# Patient Record
Sex: FEMALE | Race: WHITE | NOT HISPANIC OR LATINO | Employment: FULL TIME | URBAN - METROPOLITAN AREA
[De-identification: names, ages, dates, MRNs, and addresses within clinical notes are randomized per-mention and may not be internally consistent; named-entity substitution may affect disease eponyms.]

---

## 2020-05-30 ENCOUNTER — APPOINTMENT (OUTPATIENT)
Dept: GENERAL RADIOLOGY | Facility: HOSPITAL | Age: 40
End: 2020-05-30

## 2020-05-30 ENCOUNTER — ANESTHESIA EVENT (OUTPATIENT)
Dept: PERIOP | Facility: HOSPITAL | Age: 40
End: 2020-05-30

## 2020-05-30 ENCOUNTER — HOSPITAL ENCOUNTER (INPATIENT)
Facility: HOSPITAL | Age: 40
LOS: 5 days | Discharge: HOME HEALTH | End: 2020-06-04
Attending: SURGERY | Admitting: INTERNAL MEDICINE

## 2020-05-30 ENCOUNTER — ANESTHESIA (OUTPATIENT)
Dept: PERIOP | Facility: HOSPITAL | Age: 40
End: 2020-05-30

## 2020-05-30 ENCOUNTER — APPOINTMENT (OUTPATIENT)
Dept: CT IMAGING | Facility: HOSPITAL | Age: 40
End: 2020-05-30

## 2020-05-30 DIAGNOSIS — R65.21 SEVERE SEPSIS WITH SEPTIC SHOCK (HCC): Primary | ICD-10-CM

## 2020-05-30 DIAGNOSIS — Z90.49 S/P APPENDECTOMY: ICD-10-CM

## 2020-05-30 DIAGNOSIS — A41.9 SEPSIS, DUE TO UNSPECIFIED ORGANISM, UNSPECIFIED WHETHER ACUTE ORGAN DYSFUNCTION PRESENT (HCC): ICD-10-CM

## 2020-05-30 DIAGNOSIS — R10.9 ABDOMINAL PAIN, UNSPECIFIED ABDOMINAL LOCATION: ICD-10-CM

## 2020-05-30 DIAGNOSIS — A41.9 SEVERE SEPSIS WITH SEPTIC SHOCK (HCC): Primary | ICD-10-CM

## 2020-05-30 DIAGNOSIS — K35.32 RUPTURED APPENDICITIS: ICD-10-CM

## 2020-05-30 LAB
ABO GROUP BLD: NORMAL
ALBUMIN SERPL-MCNC: 2.9 G/DL (ref 3.5–5.2)
ALBUMIN/GLOB SERPL: 0.9 G/DL
ALP SERPL-CCNC: 44 U/L (ref 39–117)
ALT SERPL W P-5'-P-CCNC: 7 U/L (ref 1–33)
ANION GAP SERPL CALCULATED.3IONS-SCNC: 14 MMOL/L (ref 5–15)
APTT PPP: 24.4 SECONDS (ref 24–31)
AST SERPL-CCNC: 9 U/L (ref 1–32)
B PERT DNA SPEC QL NAA+PROBE: NOT DETECTED
B-HCG UR QL: NEGATIVE
BACTERIA UR QL AUTO: ABNORMAL /HPF
BASOPHILS # BLD AUTO: 0 10*3/MM3 (ref 0–0.2)
BASOPHILS NFR BLD AUTO: 0.3 % (ref 0–1.5)
BILIRUB SERPL-MCNC: 0.9 MG/DL (ref 0.2–1.2)
BILIRUB UR QL STRIP: ABNORMAL
BLD GP AB SCN SERPL QL: NEGATIVE
BUN BLD-MCNC: 15 MG/DL (ref 6–20)
BUN/CREAT SERPL: 14.2 (ref 7–25)
C PNEUM DNA NPH QL NAA+NON-PROBE: NOT DETECTED
CA-I SERPL ISE-MCNC: 1.19 MMOL/L (ref 1.2–1.3)
CALCIUM SPEC-SCNC: 8.6 MG/DL (ref 8.6–10.5)
CHLORIDE SERPL-SCNC: 102 MMOL/L (ref 98–107)
CLARITY UR: ABNORMAL
CO2 SERPL-SCNC: 16 MMOL/L (ref 22–29)
COLOR UR: ABNORMAL
CREAT BLD-MCNC: 1.06 MG/DL (ref 0.57–1)
CRP SERPL-MCNC: 26.41 MG/DL (ref 0–0.5)
D-LACTATE SERPL-SCNC: 1.9 MMOL/L (ref 0.5–2)
D-LACTATE SERPL-SCNC: 2.6 MMOL/L (ref 0.5–2)
DEPRECATED RDW RBC AUTO: 42 FL (ref 37–54)
EOSINOPHIL # BLD AUTO: 0 10*3/MM3 (ref 0–0.4)
EOSINOPHIL NFR BLD AUTO: 0.1 % (ref 0.3–6.2)
ERYTHROCYTE [DISTWIDTH] IN BLOOD BY AUTOMATED COUNT: 13.3 % (ref 12.3–15.4)
FLUAV H1 2009 PAND RNA NPH QL NAA+PROBE: NOT DETECTED
FLUAV H1 HA GENE NPH QL NAA+PROBE: NOT DETECTED
FLUAV H3 RNA NPH QL NAA+PROBE: NOT DETECTED
FLUAV SUBTYP SPEC NAA+PROBE: NOT DETECTED
FLUBV RNA ISLT QL NAA+PROBE: NOT DETECTED
GFR SERPL CREATININE-BSD FRML MDRD: 58 ML/MIN/1.73
GLOBULIN UR ELPH-MCNC: 3.2 GM/DL
GLUCOSE BLD-MCNC: 202 MG/DL (ref 65–99)
GLUCOSE BLDC GLUCOMTR-MCNC: 178 MG/DL (ref 70–105)
GLUCOSE BLDC GLUCOMTR-MCNC: 181 MG/DL (ref 70–105)
GLUCOSE UR STRIP-MCNC: ABNORMAL MG/DL
GRAN CASTS URNS QL MICRO: ABNORMAL /LPF
HADV DNA SPEC NAA+PROBE: NOT DETECTED
HCOV 229E RNA SPEC QL NAA+PROBE: NOT DETECTED
HCOV HKU1 RNA SPEC QL NAA+PROBE: NOT DETECTED
HCOV NL63 RNA SPEC QL NAA+PROBE: NOT DETECTED
HCOV OC43 RNA SPEC QL NAA+PROBE: NOT DETECTED
HCT VFR BLD AUTO: 34 % (ref 34–46.6)
HGB BLD-MCNC: 11.2 G/DL (ref 12–15.9)
HGB UR QL STRIP.AUTO: NEGATIVE
HMPV RNA NPH QL NAA+NON-PROBE: NOT DETECTED
HOLD SPECIMEN: NORMAL
HPIV1 RNA SPEC QL NAA+PROBE: NOT DETECTED
HPIV2 RNA SPEC QL NAA+PROBE: NOT DETECTED
HPIV3 RNA NPH QL NAA+PROBE: NOT DETECTED
HPIV4 P GENE NPH QL NAA+PROBE: NOT DETECTED
HYALINE CASTS UR QL AUTO: ABNORMAL /LPF
INR PPP: 1.06 (ref 0.9–1.1)
KETONES UR QL STRIP: ABNORMAL
LACTATE HOLD SPECIMEN: NORMAL
LEUKOCYTE ESTERASE UR QL STRIP.AUTO: ABNORMAL
LIPASE SERPL-CCNC: 9 U/L (ref 13–60)
LYMPHOCYTES # BLD AUTO: 0.3 10*3/MM3 (ref 0.7–3.1)
LYMPHOCYTES NFR BLD AUTO: 10.2 % (ref 19.6–45.3)
M PNEUMO IGG SER IA-ACNC: NOT DETECTED
MAGNESIUM SERPL-MCNC: 1.6 MG/DL (ref 1.6–2.6)
MCH RBC QN AUTO: 29.7 PG (ref 26.6–33)
MCHC RBC AUTO-ENTMCNC: 33 G/DL (ref 31.5–35.7)
MCV RBC AUTO: 89.8 FL (ref 79–97)
MONOCYTES # BLD AUTO: 0.2 10*3/MM3 (ref 0.1–0.9)
MONOCYTES NFR BLD AUTO: 5.6 % (ref 5–12)
MRSA DNA SPEC QL NAA+PROBE: NORMAL
NEUTROPHILS # BLD AUTO: 2.9 10*3/MM3 (ref 1.7–7)
NEUTROPHILS NFR BLD AUTO: 83.8 % (ref 42.7–76)
NITRITE UR QL STRIP: POSITIVE
NRBC BLD AUTO-RTO: 0 /100 WBC (ref 0–0.2)
PH UR STRIP.AUTO: <=5 [PH] (ref 5–8)
PHOSPHATE SERPL-MCNC: 2.7 MG/DL (ref 2.5–4.5)
PLATELET # BLD AUTO: 391 10*3/MM3 (ref 140–450)
PMV BLD AUTO: 7.5 FL (ref 6–12)
POTASSIUM BLD-SCNC: 3.5 MMOL/L (ref 3.5–5.2)
PROCALCITONIN SERPL-MCNC: 7.41 NG/ML (ref 0.1–0.25)
PROT SERPL-MCNC: 6.1 G/DL (ref 6–8.5)
PROT UR QL STRIP: ABNORMAL
PROTHROMBIN TIME: 11 SECONDS (ref 9.6–11.7)
RBC # BLD AUTO: 3.79 10*6/MM3 (ref 3.77–5.28)
RBC # UR: ABNORMAL /HPF
REF LAB TEST METHOD: ABNORMAL
RH BLD: POSITIVE
RHINOVIRUS RNA SPEC NAA+PROBE: NOT DETECTED
RSV RNA NPH QL NAA+NON-PROBE: NOT DETECTED
SARS-COV-2 RNA RESP QL NAA+PROBE: NOT DETECTED
SODIUM BLD-SCNC: 132 MMOL/L (ref 136–145)
SP GR UR STRIP: 1.03 (ref 1–1.03)
SQUAMOUS #/AREA URNS HPF: ABNORMAL /HPF
T&S EXPIRATION DATE: NORMAL
TROPONIN T SERPL-MCNC: <0.01 NG/ML (ref 0–0.03)
UROBILINOGEN UR QL STRIP: ABNORMAL
WBC NRBC COR # BLD: 3.4 10*3/MM3 (ref 3.4–10.8)
WBC UR QL AUTO: ABNORMAL /HPF
WHOLE BLOOD HOLD SPECIMEN: NORMAL
WHOLE BLOOD HOLD SPECIMEN: NORMAL

## 2020-05-30 PROCEDURE — 83735 ASSAY OF MAGNESIUM: CPT

## 2020-05-30 PROCEDURE — 87040 BLOOD CULTURE FOR BACTERIA: CPT

## 2020-05-30 PROCEDURE — 25010000002 ALBUMIN HUMAN 5% PER 50 ML: Performed by: ANESTHESIOLOGY

## 2020-05-30 PROCEDURE — 83605 ASSAY OF LACTIC ACID: CPT

## 2020-05-30 PROCEDURE — 83036 HEMOGLOBIN GLYCOSYLATED A1C: CPT | Performed by: NURSE PRACTITIONER

## 2020-05-30 PROCEDURE — 80053 COMPREHEN METABOLIC PANEL: CPT

## 2020-05-30 PROCEDURE — 82330 ASSAY OF CALCIUM: CPT

## 2020-05-30 PROCEDURE — 25010000002 ENOXAPARIN PER 10 MG: Performed by: PHYSICIAN ASSISTANT

## 2020-05-30 PROCEDURE — P9041 ALBUMIN (HUMAN),5%, 50ML: HCPCS | Performed by: ANESTHESIOLOGY

## 2020-05-30 PROCEDURE — 36415 COLL VENOUS BLD VENIPUNCTURE: CPT

## 2020-05-30 PROCEDURE — 85730 THROMBOPLASTIN TIME PARTIAL: CPT

## 2020-05-30 PROCEDURE — 82962 GLUCOSE BLOOD TEST: CPT

## 2020-05-30 PROCEDURE — 25010000002 PROPOFOL 10 MG/ML EMULSION: Performed by: ANESTHESIOLOGY

## 2020-05-30 PROCEDURE — 25010000002 PIPERACILLIN SOD-TAZOBACTAM PER 1 G: Performed by: EMERGENCY MEDICINE

## 2020-05-30 PROCEDURE — 99284 EMERGENCY DEPT VISIT MOD MDM: CPT

## 2020-05-30 PROCEDURE — 86900 BLOOD TYPING SEROLOGIC ABO: CPT | Performed by: PHYSICIAN ASSISTANT

## 2020-05-30 PROCEDURE — 71045 X-RAY EXAM CHEST 1 VIEW: CPT

## 2020-05-30 PROCEDURE — 25010000002 ONDANSETRON PER 1 MG: Performed by: EMERGENCY MEDICINE

## 2020-05-30 PROCEDURE — 25010000002 PIPERACILLIN SOD-TAZOBACTAM PER 1 G: Performed by: NURSE PRACTITIONER

## 2020-05-30 PROCEDURE — C1751 CATH, INF, PER/CENT/MIDLINE: HCPCS

## 2020-05-30 PROCEDURE — P9612 CATHETERIZE FOR URINE SPEC: HCPCS

## 2020-05-30 PROCEDURE — 88304 TISSUE EXAM BY PATHOLOGIST: CPT | Performed by: SURGERY

## 2020-05-30 PROCEDURE — 86850 RBC ANTIBODY SCREEN: CPT | Performed by: PHYSICIAN ASSISTANT

## 2020-05-30 PROCEDURE — 25010000002 MORPHINE PER 10 MG: Performed by: EMERGENCY MEDICINE

## 2020-05-30 PROCEDURE — 81001 URINALYSIS AUTO W/SCOPE: CPT

## 2020-05-30 PROCEDURE — 44960 APPENDECTOMY: CPT | Performed by: SURGERY

## 2020-05-30 PROCEDURE — 93005 ELECTROCARDIOGRAM TRACING: CPT | Performed by: SURGERY

## 2020-05-30 PROCEDURE — 86901 BLOOD TYPING SEROLOGIC RH(D): CPT | Performed by: PHYSICIAN ASSISTANT

## 2020-05-30 PROCEDURE — 74177 CT ABD & PELVIS W/CONTRAST: CPT

## 2020-05-30 PROCEDURE — 02HV33Z INSERTION OF INFUSION DEVICE INTO SUPERIOR VENA CAVA, PERCUTANEOUS APPROACH: ICD-10-PCS | Performed by: INTERNAL MEDICINE

## 2020-05-30 PROCEDURE — 0099U HC BIOFIRE FILMARRAY RESP PANEL 1: CPT | Performed by: NURSE PRACTITIONER

## 2020-05-30 PROCEDURE — 0DTJ0ZZ RESECTION OF APPENDIX, OPEN APPROACH: ICD-10-PCS | Performed by: SURGERY

## 2020-05-30 PROCEDURE — 25010000002 ONDANSETRON PER 1 MG: Performed by: ANESTHESIOLOGY

## 2020-05-30 PROCEDURE — P9041 ALBUMIN (HUMAN),5%, 50ML: HCPCS | Performed by: SURGERY

## 2020-05-30 PROCEDURE — 86140 C-REACTIVE PROTEIN: CPT

## 2020-05-30 PROCEDURE — 84484 ASSAY OF TROPONIN QUANT: CPT | Performed by: PHYSICIAN ASSISTANT

## 2020-05-30 PROCEDURE — 36600 WITHDRAWAL OF ARTERIAL BLOOD: CPT

## 2020-05-30 PROCEDURE — 87641 MR-STAPH DNA AMP PROBE: CPT | Performed by: NURSE PRACTITIONER

## 2020-05-30 PROCEDURE — 86900 BLOOD TYPING SEROLOGIC ABO: CPT

## 2020-05-30 PROCEDURE — 25010000002 ALBUMIN HUMAN 5% PER 50 ML: Performed by: SURGERY

## 2020-05-30 PROCEDURE — 99222 1ST HOSP IP/OBS MODERATE 55: CPT | Performed by: SURGERY

## 2020-05-30 PROCEDURE — 25010000002 PHENYLEPHRINE 10 MG/ML SOLUTION: Performed by: ANESTHESIOLOGY

## 2020-05-30 PROCEDURE — 85610 PROTHROMBIN TIME: CPT

## 2020-05-30 PROCEDURE — 25010000002 DEXAMETHASONE PER 1 MG: Performed by: ANESTHESIOLOGY

## 2020-05-30 PROCEDURE — 25010000002 FENTANYL CITRATE (PF) 100 MCG/2ML SOLUTION: Performed by: ANESTHESIOLOGY

## 2020-05-30 PROCEDURE — 85025 COMPLETE CBC W/AUTO DIFF WBC: CPT

## 2020-05-30 PROCEDURE — 25010000002 MIDAZOLAM PER 1 MG: Performed by: ANESTHESIOLOGY

## 2020-05-30 PROCEDURE — 81025 URINE PREGNANCY TEST: CPT | Performed by: PHYSICIAN ASSISTANT

## 2020-05-30 PROCEDURE — 82803 BLOOD GASES ANY COMBINATION: CPT

## 2020-05-30 PROCEDURE — 63710000001 INSULIN LISPRO (HUMAN) PER 5 UNITS: Performed by: NURSE PRACTITIONER

## 2020-05-30 PROCEDURE — 83690 ASSAY OF LIPASE: CPT | Performed by: PHYSICIAN ASSISTANT

## 2020-05-30 PROCEDURE — 0 IOPAMIDOL PER 1 ML: Performed by: PHYSICIAN ASSISTANT

## 2020-05-30 PROCEDURE — 87635 SARS-COV-2 COVID-19 AMP PRB: CPT | Performed by: PHYSICIAN ASSISTANT

## 2020-05-30 PROCEDURE — 84145 PROCALCITONIN (PCT): CPT | Performed by: NURSE PRACTITIONER

## 2020-05-30 PROCEDURE — 84100 ASSAY OF PHOSPHORUS: CPT

## 2020-05-30 PROCEDURE — 93005 ELECTROCARDIOGRAM TRACING: CPT

## 2020-05-30 PROCEDURE — 86901 BLOOD TYPING SEROLOGIC RH(D): CPT

## 2020-05-30 DEVICE — ENDOPATH ECHELON VASCULAR  RELOADS, WHITE, 35MM
Type: IMPLANTABLE DEVICE | Site: ABDOMEN | Status: FUNCTIONAL
Brand: ECHELON ENDOPATH

## 2020-05-30 RX ORDER — ONDANSETRON 2 MG/ML
INJECTION INTRAMUSCULAR; INTRAVENOUS AS NEEDED
Status: DISCONTINUED | OUTPATIENT
Start: 2020-05-30 | End: 2020-05-30 | Stop reason: SURG

## 2020-05-30 RX ORDER — ACETAMINOPHEN 325 MG/1
650 TABLET ORAL ONCE AS NEEDED
Status: DISCONTINUED | OUTPATIENT
Start: 2020-05-30 | End: 2020-05-30 | Stop reason: HOSPADM

## 2020-05-30 RX ORDER — SODIUM CHLORIDE 9 MG/ML
125 INJECTION, SOLUTION INTRAVENOUS CONTINUOUS
Status: DISCONTINUED | OUTPATIENT
Start: 2020-05-30 | End: 2020-05-30

## 2020-05-30 RX ORDER — ONDANSETRON 2 MG/ML
4 INJECTION INTRAMUSCULAR; INTRAVENOUS ONCE
Status: COMPLETED | OUTPATIENT
Start: 2020-05-30 | End: 2020-05-30

## 2020-05-30 RX ORDER — SODIUM CHLORIDE 0.9 % (FLUSH) 0.9 %
10 SYRINGE (ML) INJECTION AS NEEDED
Status: DISCONTINUED | OUTPATIENT
Start: 2020-05-30 | End: 2020-05-31 | Stop reason: SDUPTHER

## 2020-05-30 RX ORDER — IPRATROPIUM BROMIDE AND ALBUTEROL SULFATE 2.5; .5 MG/3ML; MG/3ML
3 SOLUTION RESPIRATORY (INHALATION) ONCE AS NEEDED
Status: DISCONTINUED | OUTPATIENT
Start: 2020-05-30 | End: 2020-05-30 | Stop reason: HOSPADM

## 2020-05-30 RX ORDER — IPRATROPIUM BROMIDE AND ALBUTEROL SULFATE 2.5; .5 MG/3ML; MG/3ML
3 SOLUTION RESPIRATORY (INHALATION)
Status: DISPENSED | OUTPATIENT
Start: 2020-05-30 | End: 2020-06-01

## 2020-05-30 RX ORDER — PHENYLEPHRINE HCL IN 0.9% NACL 0.5 MG/5ML
SYRINGE (ML) INTRAVENOUS AS NEEDED
Status: DISCONTINUED | OUTPATIENT
Start: 2020-05-30 | End: 2020-05-30 | Stop reason: SURG

## 2020-05-30 RX ORDER — MORPHINE SULFATE 4 MG/ML
2 INJECTION, SOLUTION INTRAMUSCULAR; INTRAVENOUS
Status: DISCONTINUED | OUTPATIENT
Start: 2020-05-30 | End: 2020-05-30 | Stop reason: HOSPADM

## 2020-05-30 RX ORDER — ACETAMINOPHEN 650 MG/1
650 SUPPOSITORY RECTAL ONCE AS NEEDED
Status: DISCONTINUED | OUTPATIENT
Start: 2020-05-30 | End: 2020-05-30 | Stop reason: HOSPADM

## 2020-05-30 RX ORDER — DEXAMETHASONE SODIUM PHOSPHATE 4 MG/ML
INJECTION, SOLUTION INTRA-ARTICULAR; INTRALESIONAL; INTRAMUSCULAR; INTRAVENOUS; SOFT TISSUE AS NEEDED
Status: DISCONTINUED | OUTPATIENT
Start: 2020-05-30 | End: 2020-05-30 | Stop reason: SURG

## 2020-05-30 RX ORDER — SODIUM CHLORIDE 0.9 % (FLUSH) 0.9 %
20 SYRINGE (ML) INJECTION AS NEEDED
Status: DISCONTINUED | OUTPATIENT
Start: 2020-05-30 | End: 2020-05-31 | Stop reason: SDUPTHER

## 2020-05-30 RX ORDER — ALBUMIN, HUMAN INJ 5% 5 %
SOLUTION INTRAVENOUS CONTINUOUS PRN
Status: DISCONTINUED | OUTPATIENT
Start: 2020-05-30 | End: 2020-05-30 | Stop reason: SURG

## 2020-05-30 RX ORDER — SODIUM CHLORIDE 0.9 % (FLUSH) 0.9 %
10 SYRINGE (ML) INJECTION EVERY 12 HOURS SCHEDULED
Status: DISCONTINUED | OUTPATIENT
Start: 2020-05-30 | End: 2020-05-31 | Stop reason: SDUPTHER

## 2020-05-30 RX ORDER — SODIUM CHLORIDE, SODIUM LACTATE, POTASSIUM CHLORIDE, CALCIUM CHLORIDE 600; 310; 30; 20 MG/100ML; MG/100ML; MG/100ML; MG/100ML
INJECTION, SOLUTION INTRAVENOUS CONTINUOUS PRN
Status: DISCONTINUED | OUTPATIENT
Start: 2020-05-30 | End: 2020-05-30 | Stop reason: SURG

## 2020-05-30 RX ORDER — METRONIDAZOLE 500 MG/1
500 TABLET ORAL 3 TIMES DAILY
COMMUNITY
Start: 2020-05-25 | End: 2020-06-04 | Stop reason: HOSPADM

## 2020-05-30 RX ORDER — IPRATROPIUM BROMIDE AND ALBUTEROL SULFATE 2.5; .5 MG/3ML; MG/3ML
3 SOLUTION RESPIRATORY (INHALATION)
Status: DISCONTINUED | OUTPATIENT
Start: 2020-05-30 | End: 2020-06-04 | Stop reason: HOSPADM

## 2020-05-30 RX ORDER — PROMETHAZINE HYDROCHLORIDE 25 MG/1
25 TABLET ORAL ONCE AS NEEDED
Status: DISCONTINUED | OUTPATIENT
Start: 2020-05-30 | End: 2020-05-30 | Stop reason: HOSPADM

## 2020-05-30 RX ORDER — PROMETHAZINE HYDROCHLORIDE 25 MG/1
25 SUPPOSITORY RECTAL ONCE AS NEEDED
Status: DISCONTINUED | OUTPATIENT
Start: 2020-05-30 | End: 2020-05-30 | Stop reason: HOSPADM

## 2020-05-30 RX ORDER — ONDANSETRON 2 MG/ML
4 INJECTION INTRAMUSCULAR; INTRAVENOUS ONCE AS NEEDED
Status: DISCONTINUED | OUTPATIENT
Start: 2020-05-30 | End: 2020-05-30 | Stop reason: HOSPADM

## 2020-05-30 RX ORDER — PANTOPRAZOLE SODIUM 40 MG/10ML
40 INJECTION, POWDER, LYOPHILIZED, FOR SOLUTION INTRAVENOUS
Status: DISCONTINUED | OUTPATIENT
Start: 2020-05-31 | End: 2020-06-04 | Stop reason: HOSPADM

## 2020-05-30 RX ORDER — PROMETHAZINE HYDROCHLORIDE 25 MG/ML
6.25 INJECTION, SOLUTION INTRAMUSCULAR; INTRAVENOUS ONCE AS NEEDED
Status: DISCONTINUED | OUTPATIENT
Start: 2020-05-30 | End: 2020-05-30 | Stop reason: HOSPADM

## 2020-05-30 RX ORDER — PROPOFOL 10 MG/ML
VIAL (ML) INTRAVENOUS AS NEEDED
Status: DISCONTINUED | OUTPATIENT
Start: 2020-05-30 | End: 2020-05-30 | Stop reason: SURG

## 2020-05-30 RX ORDER — SODIUM CHLORIDE 0.9 % (FLUSH) 0.9 %
10 SYRINGE (ML) INJECTION AS NEEDED
Status: DISCONTINUED | OUTPATIENT
Start: 2020-05-30 | End: 2020-06-04 | Stop reason: HOSPADM

## 2020-05-30 RX ORDER — NICOTINE POLACRILEX 4 MG
15 LOZENGE BUCCAL
Status: DISCONTINUED | OUTPATIENT
Start: 2020-05-30 | End: 2020-06-03

## 2020-05-30 RX ORDER — MIDAZOLAM HYDROCHLORIDE 1 MG/ML
INJECTION INTRAMUSCULAR; INTRAVENOUS AS NEEDED
Status: DISCONTINUED | OUTPATIENT
Start: 2020-05-30 | End: 2020-05-30 | Stop reason: SURG

## 2020-05-30 RX ORDER — MORPHINE SULFATE 4 MG/ML
4 INJECTION, SOLUTION INTRAMUSCULAR; INTRAVENOUS ONCE
Status: DISCONTINUED | OUTPATIENT
Start: 2020-05-30 | End: 2020-05-30

## 2020-05-30 RX ORDER — LIDOCAINE HYDROCHLORIDE 20 MG/ML
INJECTION, SOLUTION EPIDURAL; INFILTRATION; INTRACAUDAL; PERINEURAL AS NEEDED
Status: DISCONTINUED | OUTPATIENT
Start: 2020-05-30 | End: 2020-05-30 | Stop reason: SURG

## 2020-05-30 RX ORDER — CIPROFLOXACIN 500 MG/1
500 TABLET, FILM COATED ORAL 2 TIMES DAILY
COMMUNITY
Start: 2020-05-25 | End: 2020-06-04 | Stop reason: HOSPADM

## 2020-05-30 RX ORDER — EPHEDRINE SULFATE/0.9% NACL/PF 25 MG/5 ML
SYRINGE (ML) INTRAVENOUS AS NEEDED
Status: DISCONTINUED | OUTPATIENT
Start: 2020-05-30 | End: 2020-05-30 | Stop reason: SURG

## 2020-05-30 RX ORDER — ALBUMIN, HUMAN INJ 5% 5 %
250 SOLUTION INTRAVENOUS ONCE
Status: COMPLETED | OUTPATIENT
Start: 2020-05-30 | End: 2020-05-30

## 2020-05-30 RX ORDER — MORPHINE SULFATE 4 MG/ML
2 INJECTION, SOLUTION INTRAMUSCULAR; INTRAVENOUS EVERY 4 HOURS PRN
Status: DISCONTINUED | OUTPATIENT
Start: 2020-05-30 | End: 2020-06-04 | Stop reason: HOSPADM

## 2020-05-30 RX ORDER — LABETALOL HYDROCHLORIDE 5 MG/ML
5 INJECTION, SOLUTION INTRAVENOUS
Status: DISCONTINUED | OUTPATIENT
Start: 2020-05-30 | End: 2020-05-30 | Stop reason: HOSPADM

## 2020-05-30 RX ORDER — PHENYLEPHRINE HCL IN 0.9% NACL 0.5 MG/5ML
.5-3 SYRINGE (ML) INTRAVENOUS
Status: DISCONTINUED | OUTPATIENT
Start: 2020-05-30 | End: 2020-06-01

## 2020-05-30 RX ORDER — FENTANYL CITRATE 50 UG/ML
INJECTION, SOLUTION INTRAMUSCULAR; INTRAVENOUS AS NEEDED
Status: DISCONTINUED | OUTPATIENT
Start: 2020-05-30 | End: 2020-05-30 | Stop reason: SURG

## 2020-05-30 RX ORDER — SODIUM CHLORIDE 0.9 % (FLUSH) 0.9 %
10 SYRINGE (ML) INJECTION EVERY 12 HOURS SCHEDULED
Status: DISCONTINUED | OUTPATIENT
Start: 2020-05-30 | End: 2020-06-04 | Stop reason: HOSPADM

## 2020-05-30 RX ORDER — ONDANSETRON 4 MG/1
4 TABLET, FILM COATED ORAL EVERY 6 HOURS PRN
Status: DISCONTINUED | OUTPATIENT
Start: 2020-05-30 | End: 2020-06-04 | Stop reason: HOSPADM

## 2020-05-30 RX ORDER — ACETAMINOPHEN 325 MG/1
650 TABLET ORAL EVERY 6 HOURS PRN
Status: DISCONTINUED | OUTPATIENT
Start: 2020-05-30 | End: 2020-06-04 | Stop reason: HOSPADM

## 2020-05-30 RX ORDER — FENTANYL CITRATE 50 UG/ML
25 INJECTION, SOLUTION INTRAMUSCULAR; INTRAVENOUS
Status: DISCONTINUED | OUTPATIENT
Start: 2020-05-30 | End: 2020-05-30

## 2020-05-30 RX ORDER — DEXTROSE MONOHYDRATE 25 G/50ML
25 INJECTION, SOLUTION INTRAVENOUS
Status: DISCONTINUED | OUTPATIENT
Start: 2020-05-30 | End: 2020-06-03

## 2020-05-30 RX ORDER — ROCURONIUM BROMIDE 10 MG/ML
INJECTION, SOLUTION INTRAVENOUS AS NEEDED
Status: DISCONTINUED | OUTPATIENT
Start: 2020-05-30 | End: 2020-05-30 | Stop reason: SURG

## 2020-05-30 RX ORDER — ONDANSETRON 2 MG/ML
4 INJECTION INTRAMUSCULAR; INTRAVENOUS EVERY 6 HOURS PRN
Status: DISCONTINUED | OUTPATIENT
Start: 2020-05-30 | End: 2020-06-04 | Stop reason: HOSPADM

## 2020-05-30 RX ADMIN — PHENYLEPHRINE HYDROCHLORIDE 200 MCG: 10 INJECTION INTRAVENOUS at 15:52

## 2020-05-30 RX ADMIN — PHENYLEPHRINE HYDROCHLORIDE 200 MCG: 10 INJECTION INTRAVENOUS at 15:33

## 2020-05-30 RX ADMIN — FENTANYL CITRATE 50 MCG: 50 INJECTION, SOLUTION INTRAMUSCULAR; INTRAVENOUS at 16:23

## 2020-05-30 RX ADMIN — Medication 2 MCG/KG/MIN: at 17:26

## 2020-05-30 RX ADMIN — PHENYLEPHRINE HYDROCHLORIDE 200 MCG: 10 INJECTION INTRAVENOUS at 15:37

## 2020-05-30 RX ADMIN — ALBUMIN HUMAN 250 ML: 0.05 INJECTION, SOLUTION INTRAVENOUS at 14:52

## 2020-05-30 RX ADMIN — PHENYLEPHRINE HYDROCHLORIDE 200 MCG: 10 INJECTION INTRAVENOUS at 15:50

## 2020-05-30 RX ADMIN — Medication 10 ML: at 23:20

## 2020-05-30 RX ADMIN — PIPERACILLIN AND TAZOBACTAM 3.38 G: 3; .375 INJECTION, POWDER, FOR SOLUTION INTRAVENOUS at 18:52

## 2020-05-30 RX ADMIN — SODIUM CHLORIDE 1641 ML: 900 INJECTION, SOLUTION INTRAVENOUS at 10:22

## 2020-05-30 RX ADMIN — SODIUM CHLORIDE 1000 ML: 900 INJECTION, SOLUTION INTRAVENOUS at 11:19

## 2020-05-30 RX ADMIN — INSULIN LISPRO 2 UNITS: 100 INJECTION, SOLUTION INTRAVENOUS; SUBCUTANEOUS at 18:52

## 2020-05-30 RX ADMIN — FENTANYL CITRATE 50 MCG: 50 INJECTION, SOLUTION INTRAMUSCULAR; INTRAVENOUS at 16:20

## 2020-05-30 RX ADMIN — Medication 10 ML: at 23:21

## 2020-05-30 RX ADMIN — PHENYLEPHRINE HYDROCHLORIDE 200 MCG: 10 INJECTION INTRAVENOUS at 15:46

## 2020-05-30 RX ADMIN — PHENYLEPHRINE HYDROCHLORIDE 200 MCG: 10 INJECTION INTRAVENOUS at 15:38

## 2020-05-30 RX ADMIN — ENOXAPARIN SODIUM 40 MG: 40 INJECTION SUBCUTANEOUS at 21:13

## 2020-05-30 RX ADMIN — MIDAZOLAM 2 MG: 1 INJECTION INTRAMUSCULAR; INTRAVENOUS at 15:30

## 2020-05-30 RX ADMIN — ONDANSETRON 4 MG: 2 INJECTION INTRAMUSCULAR; INTRAVENOUS at 10:24

## 2020-05-30 RX ADMIN — INSULIN LISPRO 2 UNITS: 100 INJECTION, SOLUTION INTRAVENOUS; SUBCUTANEOUS at 23:24

## 2020-05-30 RX ADMIN — SODIUM CHLORIDE 125 ML/HR: 0.9 INJECTION, SOLUTION INTRAVENOUS at 14:40

## 2020-05-30 RX ADMIN — Medication 5 MG: at 15:51

## 2020-05-30 RX ADMIN — ONDANSETRON 4 MG: 2 INJECTION INTRAMUSCULAR; INTRAVENOUS at 15:55

## 2020-05-30 RX ADMIN — SODIUM CHLORIDE: 0.9 INJECTION, SOLUTION INTRAVENOUS at 15:53

## 2020-05-30 RX ADMIN — MORPHINE SULFATE 2 MG: 4 INJECTION INTRAVENOUS at 13:50

## 2020-05-30 RX ADMIN — PHENYLEPHRINE HYDROCHLORIDE 200 MCG: 10 INJECTION INTRAVENOUS at 15:43

## 2020-05-30 RX ADMIN — ROCURONIUM BROMIDE 50 MG: 10 INJECTION, SOLUTION INTRAVENOUS at 15:30

## 2020-05-30 RX ADMIN — PROPOFOL 120 MG: 10 INJECTION, EMULSION INTRAVENOUS at 15:30

## 2020-05-30 RX ADMIN — Medication 3 MCG/KG/MIN: at 21:12

## 2020-05-30 RX ADMIN — PHENYLEPHRINE HYDROCHLORIDE 200 MCG: 10 INJECTION INTRAVENOUS at 15:35

## 2020-05-30 RX ADMIN — DEXAMETHASONE SODIUM PHOSPHATE 4 MG: 4 INJECTION, SOLUTION INTRAMUSCULAR; INTRAVENOUS at 15:45

## 2020-05-30 RX ADMIN — ACETAMINOPHEN 650 MG: 325 TABLET, FILM COATED ORAL at 21:11

## 2020-05-30 RX ADMIN — PIPERACILLIN AND TAZOBACTAM 3.38 G: 3; .375 INJECTION, POWDER, FOR SOLUTION INTRAVENOUS at 10:24

## 2020-05-30 RX ADMIN — ALBUMIN HUMAN: 0.05 INJECTION, SOLUTION INTRAVENOUS at 15:55

## 2020-05-30 RX ADMIN — PHENYLEPHRINE HYDROCHLORIDE 200 MCG: 10 INJECTION INTRAVENOUS at 15:48

## 2020-05-30 RX ADMIN — PHENYLEPHRINE HYDROCHLORIDE 200 MCG: 10 INJECTION INTRAVENOUS at 15:45

## 2020-05-30 RX ADMIN — FENTANYL CITRATE 100 MCG: 50 INJECTION, SOLUTION INTRAMUSCULAR; INTRAVENOUS at 15:30

## 2020-05-30 RX ADMIN — PHENYLEPHRINE HYDROCHLORIDE 200 MCG: 10 INJECTION INTRAVENOUS at 15:41

## 2020-05-30 RX ADMIN — SODIUM CHLORIDE, SODIUM LACTATE, POTASSIUM CHLORIDE, AND CALCIUM CHLORIDE: .6; .31; .03; .02 INJECTION, SOLUTION INTRAVENOUS at 15:23

## 2020-05-30 RX ADMIN — PHENYLEPHRINE HYDROCHLORIDE 200 MCG: 10 INJECTION INTRAVENOUS at 15:34

## 2020-05-30 RX ADMIN — PHENYLEPHRINE HYDROCHLORIDE 200 MCG: 10 INJECTION INTRAVENOUS at 15:39

## 2020-05-30 RX ADMIN — LIDOCAINE HYDROCHLORIDE 100 MG: 20 INJECTION, SOLUTION EPIDURAL; INFILTRATION; INTRACAUDAL; PERINEURAL at 15:30

## 2020-05-30 RX ADMIN — IOPAMIDOL 100 ML: 755 INJECTION, SOLUTION INTRAVENOUS at 11:54

## 2020-05-30 NOTE — ANESTHESIA POSTPROCEDURE EVALUATION
Patient: Suzie Duvall    Procedure Summary     Date:  05/30/20 Room / Location:  [facility] OR  / [facility] MAIN OR    Anesthesia Start:  1523 Anesthesia Stop:  1630    Procedure:  LAPAROTOMY EXPLORATORY (N/A Abdomen) Diagnosis:       Ruptured appendicitis      (Ruptured appendicitis [K35.32])    Surgeon:  Scarlet Ruvalcaba MD Provider:  Deshaun Valladares MD    Anesthesia Type:  general ASA Status:  3 - Emergent          Anesthesia Type: general    Vitals  Vitals Value Taken Time   BP 87/51 5/30/2020  5:28 PM   Temp 101.6 °F (38.7 °C) 5/30/2020  4:30 PM   Pulse 122 5/30/2020  5:30 PM   Resp 14 5/30/2020  4:30 PM   SpO2 100 % 5/30/2020  5:30 PM   Vitals shown include unvalidated device data.        Post Anesthesia Care and Evaluation    Patient location during evaluation: PACU  Patient participation: complete - patient participated  Level of consciousness: sleepy but conscious  Pain score: 0  Pain management: adequate  Airway patency: patent  Anesthetic complications: No anesthetic complications  PONV Status: none  Cardiovascular status: acceptable and hypotensive  Respiratory status: acceptable  Hydration status: acceptable

## 2020-05-30 NOTE — ANESTHESIA PROCEDURE NOTES
Airway  Urgency: emergent    Date/Time: 5/30/2020 3:28 PM  Airway not difficult    General Information and Staff    Patient location during procedure: OR  Anesthesiologist: Deshaun Valladares MD    Consent for Airway (if performed for an anesthetic, see related documentation for consents)  Patient identity confirmed: verbally with patient  Consent: No emergent situation. Verbal consent obtained.  Consent given by: patient      Indications and Patient Condition  Indications for airway management: airway protection and cardiovascular instability    Preoxygenated: yes  MILS not maintained throughout  Mask difficulty assessment: 0 - not attempted    Final Airway Details  Final airway type: endotracheal airway      Successful airway: ETT  Cuffed: yes   Successful intubation technique: direct laryngoscopy  Facilitating devices/methods: intubating stylet  Endotracheal tube insertion site: oral  Blade: Abbi  Blade size: 3  ETT size (mm): 7.0  Cormack-Lehane Classification: grade IIa - partial view of glottis  Placement verified by: capnometry   Measured from: gums  ETT/EBT to gums (cm): 20  Number of attempts at approach: 1  Assessment: lips, teeth, and gum same as pre-op and atraumatic intubation    Additional Comments  Full PPE

## 2020-05-30 NOTE — ANESTHESIA PREPROCEDURE EVALUATION
Anesthesia Evaluation     Patient summary reviewed and Nursing notes reviewed   NPO Solid Status: > 8 hours  NPO Liquid Status: > 8 hours           Airway   Mallampati: I  TM distance: >3 FB  Neck ROM: full  No difficulty expected  Dental - normal exam   (+) poor dentition    Pulmonary - negative pulmonary ROS and normal exam     PE comment: tachypnea  Cardiovascular - negative cardio ROS      PE comment: tachycardia    Neuro/Psych- negative ROS  GI/Hepatic/Renal/Endo    (+) obesity,       ROS Comment: Ruptured appendicitis    Musculoskeletal (-) negative ROS    Abdominal     Abdomen: rigid.   Substance History - negative use     OB/GYN negative ob/gyn ROS         Other                      Anesthesia Plan    ASA 4 - emergent     general     intravenous induction     Anesthetic plan, all risks, benefits, and alternatives have been provided, discussed and informed consent has been obtained with: patient.

## 2020-05-31 ENCOUNTER — APPOINTMENT (OUTPATIENT)
Dept: GENERAL RADIOLOGY | Facility: HOSPITAL | Age: 40
End: 2020-05-31

## 2020-05-31 PROBLEM — K35.32 RUPTURED APPENDICITIS: Status: ACTIVE | Noted: 2020-05-31

## 2020-05-31 LAB
ALBUMIN SERPL-MCNC: 2.9 G/DL (ref 3.5–5.2)
ALBUMIN/GLOB SERPL: 1 G/DL
ALP SERPL-CCNC: 38 U/L (ref 39–117)
ALT SERPL W P-5'-P-CCNC: 12 U/L (ref 1–33)
ANION GAP SERPL CALCULATED.3IONS-SCNC: 10 MMOL/L (ref 5–15)
ANION GAP SERPL CALCULATED.3IONS-SCNC: 11 MMOL/L (ref 5–15)
AST SERPL-CCNC: 23 U/L (ref 1–32)
BILIRUB SERPL-MCNC: 0.4 MG/DL (ref 0.2–1.2)
BUN BLD-MCNC: 10 MG/DL (ref 6–20)
BUN BLD-MCNC: 9 MG/DL (ref 6–20)
BUN/CREAT SERPL: 14.3 (ref 7–25)
BUN/CREAT SERPL: 15.5 (ref 7–25)
CA-I SERPL ISE-MCNC: 1.06 MMOL/L (ref 1.2–1.3)
CALCIUM SPEC-SCNC: 7.1 MG/DL (ref 8.6–10.5)
CALCIUM SPEC-SCNC: 7.6 MG/DL (ref 8.6–10.5)
CHLORIDE SERPL-SCNC: 111 MMOL/L (ref 98–107)
CHLORIDE SERPL-SCNC: 113 MMOL/L (ref 98–107)
CHOLEST SERPL-MCNC: 68 MG/DL (ref 0–200)
CO2 SERPL-SCNC: 16 MMOL/L (ref 22–29)
CO2 SERPL-SCNC: 18 MMOL/L (ref 22–29)
CREAT BLD-MCNC: 0.58 MG/DL (ref 0.57–1)
CREAT BLD-MCNC: 0.7 MG/DL (ref 0.57–1)
D-LACTATE SERPL-SCNC: 1.6 MMOL/L (ref 0.5–2)
D-LACTATE SERPL-SCNC: 2.2 MMOL/L (ref 0.5–2)
DEPRECATED RDW RBC AUTO: 44.6 FL (ref 37–54)
DEPRECATED RDW RBC AUTO: 45.5 FL (ref 37–54)
DOHLE BODIES: PRESENT
ERYTHROCYTE [DISTWIDTH] IN BLOOD BY AUTOMATED COUNT: 14.1 % (ref 12.3–15.4)
ERYTHROCYTE [DISTWIDTH] IN BLOOD BY AUTOMATED COUNT: 14.2 % (ref 12.3–15.4)
GFR SERPL CREATININE-BSD FRML MDRD: 116 ML/MIN/1.73
GFR SERPL CREATININE-BSD FRML MDRD: 93 ML/MIN/1.73
GLOBULIN UR ELPH-MCNC: 2.8 GM/DL
GLUCOSE BLD-MCNC: 158 MG/DL (ref 65–99)
GLUCOSE BLD-MCNC: 177 MG/DL (ref 65–99)
GLUCOSE BLDC GLUCOMTR-MCNC: 126 MG/DL (ref 70–105)
GLUCOSE BLDC GLUCOMTR-MCNC: 139 MG/DL (ref 70–105)
GLUCOSE BLDC GLUCOMTR-MCNC: 178 MG/DL (ref 70–105)
HCT VFR BLD AUTO: 25.4 % (ref 34–46.6)
HCT VFR BLD AUTO: 30 % (ref 34–46.6)
HDLC SERPL-MCNC: 28 MG/DL (ref 40–60)
HGB BLD-MCNC: 8.4 G/DL (ref 12–15.9)
HGB BLD-MCNC: 9.7 G/DL (ref 12–15.9)
LDLC SERPL CALC-MCNC: 32 MG/DL (ref 0–100)
LDLC/HDLC SERPL: 1.13 {RATIO}
LYMPHOCYTES # BLD MANUAL: 1.36 10*3/MM3 (ref 0.7–3.1)
LYMPHOCYTES # BLD MANUAL: 1.42 10*3/MM3 (ref 0.7–3.1)
LYMPHOCYTES NFR BLD MANUAL: 1 % (ref 5–12)
LYMPHOCYTES NFR BLD MANUAL: 10 % (ref 19.6–45.3)
LYMPHOCYTES NFR BLD MANUAL: 4 % (ref 5–12)
LYMPHOCYTES NFR BLD MANUAL: 8 % (ref 19.6–45.3)
MAGNESIUM SERPL-MCNC: 1.5 MG/DL (ref 1.6–2.6)
MCH RBC QN AUTO: 29.3 PG (ref 26.6–33)
MCH RBC QN AUTO: 29.6 PG (ref 26.6–33)
MCHC RBC AUTO-ENTMCNC: 32.5 G/DL (ref 31.5–35.7)
MCHC RBC AUTO-ENTMCNC: 33 G/DL (ref 31.5–35.7)
MCV RBC AUTO: 88.6 FL (ref 79–97)
MCV RBC AUTO: 91.1 FL (ref 79–97)
METAMYELOCYTES NFR BLD MANUAL: 4 % (ref 0–0)
METAMYELOCYTES NFR BLD MANUAL: 5 % (ref 0–0)
MONOCYTES # BLD AUTO: 0.14 10*3/MM3 (ref 0.1–0.9)
MONOCYTES # BLD AUTO: 0.68 10*3/MM3 (ref 0.1–0.9)
MYELOCYTES NFR BLD MANUAL: 1 % (ref 0–0)
MYELOCYTES NFR BLD MANUAL: 3 % (ref 0–0)
NEUTROPHILS # BLD AUTO: 11.64 10*3/MM3 (ref 1.7–7)
NEUTROPHILS # BLD AUTO: 13.94 10*3/MM3 (ref 1.7–7)
NEUTROPHILS NFR BLD MANUAL: 34 % (ref 42.7–76)
NEUTROPHILS NFR BLD MANUAL: 55 % (ref 42.7–76)
NEUTS BAND NFR BLD MANUAL: 27 % (ref 0–5)
NEUTS BAND NFR BLD MANUAL: 48 % (ref 0–5)
NEUTS VAC BLD QL SMEAR: ABNORMAL
NEUTS VAC BLD QL SMEAR: ABNORMAL
PHOSPHATE SERPL-MCNC: 2.9 MG/DL (ref 2.5–4.5)
PLAT MORPH BLD: NORMAL
PLAT MORPH BLD: NORMAL
PLATELET # BLD AUTO: 313 10*3/MM3 (ref 140–450)
PLATELET # BLD AUTO: 391 10*3/MM3 (ref 140–450)
PMV BLD AUTO: 7.2 FL (ref 6–12)
PMV BLD AUTO: 7.4 FL (ref 6–12)
POTASSIUM BLD-SCNC: 3.4 MMOL/L (ref 3.5–5.2)
POTASSIUM BLD-SCNC: 3.9 MMOL/L (ref 3.5–5.2)
PROT SERPL-MCNC: 5.7 G/DL (ref 6–8.5)
RBC # BLD AUTO: 2.87 10*6/MM3 (ref 3.77–5.28)
RBC # BLD AUTO: 3.29 10*6/MM3 (ref 3.77–5.28)
RBC MORPH BLD: NORMAL
RBC MORPH BLD: NORMAL
SCAN SLIDE: NORMAL
SCAN SLIDE: NORMAL
SODIUM BLD-SCNC: 138 MMOL/L (ref 136–145)
SODIUM BLD-SCNC: 141 MMOL/L (ref 136–145)
TOXIC GRANULATION: ABNORMAL
TOXIC GRANULATION: ABNORMAL
TRIGL SERPL-MCNC: 42 MG/DL (ref 0–150)
VLDLC SERPL-MCNC: 8.4 MG/DL
WBC NRBC COR # BLD: 14.2 10*3/MM3 (ref 3.4–10.8)
WBC NRBC COR # BLD: 17 10*3/MM3 (ref 3.4–10.8)

## 2020-05-31 PROCEDURE — 94799 UNLISTED PULMONARY SVC/PX: CPT

## 2020-05-31 PROCEDURE — 85007 BL SMEAR W/DIFF WBC COUNT: CPT | Performed by: NURSE PRACTITIONER

## 2020-05-31 PROCEDURE — 25010000002 PHENYLEPHRINE 10 MG/ML SOLUTION: Performed by: ANESTHESIOLOGY

## 2020-05-31 PROCEDURE — 25010000002 MORPHINE PER 10 MG: Performed by: NURSE PRACTITIONER

## 2020-05-31 PROCEDURE — 82962 GLUCOSE BLOOD TEST: CPT

## 2020-05-31 PROCEDURE — P9047 ALBUMIN (HUMAN), 25%, 50ML: HCPCS | Performed by: NURSE PRACTITIONER

## 2020-05-31 PROCEDURE — 25010000002 PIPERACILLIN SOD-TAZOBACTAM PER 1 G: Performed by: NURSE PRACTITIONER

## 2020-05-31 PROCEDURE — 25010000002 MAGNESIUM SULFATE 2 GM/50ML SOLUTION: Performed by: NURSE PRACTITIONER

## 2020-05-31 PROCEDURE — 25010000002 CALCIUM GLUCONATE 2-0.675 GM/100ML-% SOLUTION: Performed by: NURSE PRACTITIONER

## 2020-05-31 PROCEDURE — 84100 ASSAY OF PHOSPHORUS: CPT | Performed by: NURSE PRACTITIONER

## 2020-05-31 PROCEDURE — 82330 ASSAY OF CALCIUM: CPT | Performed by: NURSE PRACTITIONER

## 2020-05-31 PROCEDURE — 25010000002 PIPERACILLIN SOD-TAZOBACTAM PER 1 G: Performed by: INTERNAL MEDICINE

## 2020-05-31 PROCEDURE — 82803 BLOOD GASES ANY COMBINATION: CPT

## 2020-05-31 PROCEDURE — 80061 LIPID PANEL: CPT | Performed by: NURSE PRACTITIONER

## 2020-05-31 PROCEDURE — 71045 X-RAY EXAM CHEST 1 VIEW: CPT

## 2020-05-31 PROCEDURE — 25010000003 POTASSIUM CHLORIDE 10 MEQ/100ML SOLUTION: Performed by: NURSE PRACTITIONER

## 2020-05-31 PROCEDURE — 83605 ASSAY OF LACTIC ACID: CPT | Performed by: NURSE PRACTITIONER

## 2020-05-31 PROCEDURE — 25010000002 ENOXAPARIN PER 10 MG: Performed by: PHYSICIAN ASSISTANT

## 2020-05-31 PROCEDURE — 25010000002 ALBUMIN HUMAN 25% PER 50 ML: Performed by: NURSE PRACTITIONER

## 2020-05-31 PROCEDURE — 85025 COMPLETE CBC W/AUTO DIFF WBC: CPT | Performed by: NURSE PRACTITIONER

## 2020-05-31 PROCEDURE — 36600 WITHDRAWAL OF ARTERIAL BLOOD: CPT

## 2020-05-31 PROCEDURE — 83735 ASSAY OF MAGNESIUM: CPT | Performed by: NURSE PRACTITIONER

## 2020-05-31 PROCEDURE — 63710000001 INSULIN LISPRO (HUMAN) PER 5 UNITS: Performed by: NURSE PRACTITIONER

## 2020-05-31 PROCEDURE — 80053 COMPREHEN METABOLIC PANEL: CPT | Performed by: NURSE PRACTITIONER

## 2020-05-31 RX ORDER — MAGNESIUM SULFATE HEPTAHYDRATE 40 MG/ML
2 INJECTION, SOLUTION INTRAVENOUS ONCE
Status: COMPLETED | OUTPATIENT
Start: 2020-05-31 | End: 2020-05-31

## 2020-05-31 RX ORDER — CALCIUM GLUCONATE 20 MG/ML
2 INJECTION, SOLUTION INTRAVENOUS ONCE
Status: COMPLETED | OUTPATIENT
Start: 2020-05-31 | End: 2020-05-31

## 2020-05-31 RX ORDER — ALBUMIN (HUMAN) 12.5 G/50ML
25 SOLUTION INTRAVENOUS ONCE
Status: COMPLETED | OUTPATIENT
Start: 2020-05-31 | End: 2020-05-31

## 2020-05-31 RX ORDER — POTASSIUM CHLORIDE 7.45 MG/ML
10 INJECTION INTRAVENOUS
Status: DISCONTINUED | OUTPATIENT
Start: 2020-05-31 | End: 2020-06-04 | Stop reason: HOSPADM

## 2020-05-31 RX ORDER — HYDROCODONE BITARTRATE AND ACETAMINOPHEN 5; 325 MG/1; MG/1
1 TABLET ORAL EVERY 6 HOURS PRN
Status: DISCONTINUED | OUTPATIENT
Start: 2020-05-31 | End: 2020-06-04 | Stop reason: HOSPADM

## 2020-05-31 RX ADMIN — Medication 10 ML: at 08:03

## 2020-05-31 RX ADMIN — MORPHINE SULFATE 2 MG: 4 INJECTION INTRAVENOUS at 00:53

## 2020-05-31 RX ADMIN — SODIUM CHLORIDE 1000 ML: 900 INJECTION, SOLUTION INTRAVENOUS at 09:03

## 2020-05-31 RX ADMIN — POTASSIUM CHLORIDE 10 MEQ: 7.46 INJECTION, SOLUTION INTRAVENOUS at 21:38

## 2020-05-31 RX ADMIN — Medication 1 MCG/KG/MIN: at 01:01

## 2020-05-31 RX ADMIN — CALCIUM GLUCONATE 2 G: 20 INJECTION, SOLUTION INTRAVENOUS at 06:01

## 2020-05-31 RX ADMIN — POTASSIUM CHLORIDE 10 MEQ: 7.46 INJECTION, SOLUTION INTRAVENOUS at 20:03

## 2020-05-31 RX ADMIN — MORPHINE SULFATE 2 MG: 4 INJECTION INTRAVENOUS at 16:34

## 2020-05-31 RX ADMIN — IPRATROPIUM BROMIDE AND ALBUTEROL SULFATE 3 ML: .5; 3 SOLUTION RESPIRATORY (INHALATION) at 12:17

## 2020-05-31 RX ADMIN — Medication 10 ML: at 20:03

## 2020-05-31 RX ADMIN — HYDROCODONE BITARTRATE AND ACETAMINOPHEN 1 TABLET: 5; 325 TABLET ORAL at 21:40

## 2020-05-31 RX ADMIN — SODIUM CHLORIDE 500 ML: 900 INJECTION, SOLUTION INTRAVENOUS at 02:19

## 2020-05-31 RX ADMIN — IPRATROPIUM BROMIDE AND ALBUTEROL SULFATE 3 ML: .5; 3 SOLUTION RESPIRATORY (INHALATION) at 18:51

## 2020-05-31 RX ADMIN — MORPHINE SULFATE 2 MG: 4 INJECTION INTRAVENOUS at 07:53

## 2020-05-31 RX ADMIN — ENOXAPARIN SODIUM 40 MG: 40 INJECTION SUBCUTANEOUS at 16:34

## 2020-05-31 RX ADMIN — MAGNESIUM SULFATE HEPTAHYDRATE 2 G: 40 INJECTION, SOLUTION INTRAVENOUS at 06:01

## 2020-05-31 RX ADMIN — Medication 10 ML: at 08:04

## 2020-05-31 RX ADMIN — POTASSIUM CHLORIDE 10 MEQ: 7.46 INJECTION, SOLUTION INTRAVENOUS at 16:35

## 2020-05-31 RX ADMIN — ALBUMIN HUMAN 25 G: 0.25 SOLUTION INTRAVENOUS at 11:56

## 2020-05-31 RX ADMIN — IPRATROPIUM BROMIDE AND ALBUTEROL SULFATE 3 ML: .5; 3 SOLUTION RESPIRATORY (INHALATION) at 07:02

## 2020-05-31 RX ADMIN — PIPERACILLIN AND TAZOBACTAM 4.5 G: 4; .5 INJECTION, POWDER, FOR SOLUTION INTRAVENOUS at 18:00

## 2020-05-31 RX ADMIN — SODIUM BICARBONATE 100 MEQ: 84 INJECTION, SOLUTION INTRAVENOUS at 06:01

## 2020-05-31 RX ADMIN — POTASSIUM CHLORIDE 10 MEQ: 7.46 INJECTION, SOLUTION INTRAVENOUS at 18:00

## 2020-05-31 RX ADMIN — PIPERACILLIN AND TAZOBACTAM 3.38 G: 3; .375 INJECTION, POWDER, FOR SOLUTION INTRAVENOUS at 01:02

## 2020-05-31 RX ADMIN — PANTOPRAZOLE SODIUM 40 MG: 40 INJECTION, POWDER, FOR SOLUTION INTRAVENOUS at 06:27

## 2020-05-31 RX ADMIN — PIPERACILLIN AND TAZOBACTAM 3.38 G: 3; .375 INJECTION, POWDER, FOR SOLUTION INTRAVENOUS at 09:02

## 2020-05-31 RX ADMIN — SODIUM CHLORIDE 1000 ML: 900 INJECTION, SOLUTION INTRAVENOUS at 13:37

## 2020-05-31 RX ADMIN — INSULIN LISPRO 2 UNITS: 100 INJECTION, SOLUTION INTRAVENOUS; SUBCUTANEOUS at 11:56

## 2020-05-31 RX ADMIN — HYDROCODONE BITARTRATE AND ACETAMINOPHEN 1 TABLET: 5; 325 TABLET ORAL at 11:59

## 2020-06-01 LAB
ANION GAP SERPL CALCULATED.3IONS-SCNC: 8 MMOL/L (ref 5–15)
ARTERIAL PATENCY WRIST A: ABNORMAL
ARTERIAL PATENCY WRIST A: POSITIVE
ARTERIAL PATENCY WRIST A: POSITIVE
ATMOSPHERIC PRESS: ABNORMAL MM[HG]
BASE EXCESS BLDA CALC-SCNC: -6.3 MMOL/L (ref 0–3)
BASE EXCESS BLDA CALC-SCNC: -7.1 MMOL/L (ref 0–3)
BASE EXCESS BLDA CALC-SCNC: -9.2 MMOL/L (ref 0–3)
BASOPHILS # BLD AUTO: 0 10*3/MM3 (ref 0–0.2)
BASOPHILS NFR BLD AUTO: 0.2 % (ref 0–1.5)
BDY SITE: ABNORMAL
BUN BLD-MCNC: 9 MG/DL (ref 6–20)
BUN/CREAT SERPL: 18 (ref 7–25)
CALCIUM SPEC-SCNC: 8.2 MG/DL (ref 8.6–10.5)
CHLORIDE SERPL-SCNC: 111 MMOL/L (ref 98–107)
CO2 BLDA-SCNC: 17.3 MMOL/L (ref 22–29)
CO2 BLDA-SCNC: 17.3 MMOL/L (ref 22–29)
CO2 BLDA-SCNC: 19.5 MMOL/L (ref 22–29)
CO2 SERPL-SCNC: 21 MMOL/L (ref 22–29)
CREAT BLD-MCNC: 0.5 MG/DL (ref 0.57–1)
D-LACTATE SERPL-SCNC: 1.2 MMOL/L (ref 0.5–2)
DEPRECATED RDW RBC AUTO: 44.2 FL (ref 37–54)
EOSINOPHIL # BLD AUTO: 0 10*3/MM3 (ref 0–0.4)
EOSINOPHIL NFR BLD AUTO: 0.1 % (ref 0.3–6.2)
ERYTHROCYTE [DISTWIDTH] IN BLOOD BY AUTOMATED COUNT: 14.3 % (ref 12.3–15.4)
GFR SERPL CREATININE-BSD FRML MDRD: 137 ML/MIN/1.73
GLUCOSE BLD-MCNC: 124 MG/DL (ref 65–99)
GLUCOSE BLDC GLUCOMTR-MCNC: 105 MG/DL (ref 70–105)
GLUCOSE BLDC GLUCOMTR-MCNC: 109 MG/DL (ref 70–105)
GLUCOSE BLDC GLUCOMTR-MCNC: 113 MG/DL (ref 70–105)
GLUCOSE BLDC GLUCOMTR-MCNC: 119 MG/DL (ref 70–105)
HBA1C MFR BLD: 5.5 % (ref 3.5–5.6)
HCO3 BLDA-SCNC: 16.3 MMOL/L (ref 21–28)
HCO3 BLDA-SCNC: 16.5 MMOL/L (ref 21–28)
HCO3 BLDA-SCNC: 18.5 MMOL/L (ref 21–28)
HCT VFR BLD AUTO: 24.3 % (ref 34–46.6)
HEMODILUTION: NO
HGB BLD-MCNC: 8.4 G/DL (ref 12–15.9)
HOROWITZ INDEX BLD+IHG-RTO: 21 %
HOROWITZ INDEX BLD+IHG-RTO: <21 %
HOROWITZ INDEX BLD+IHG-RTO: <21 %
LYMPHOCYTES # BLD AUTO: 1.3 10*3/MM3 (ref 0.7–3.1)
LYMPHOCYTES NFR BLD AUTO: 7.2 % (ref 19.6–45.3)
MAGNESIUM SERPL-MCNC: 2.2 MG/DL (ref 1.6–2.6)
MCH RBC QN AUTO: 30.2 PG (ref 26.6–33)
MCHC RBC AUTO-ENTMCNC: 34.6 G/DL (ref 31.5–35.7)
MCV RBC AUTO: 87.3 FL (ref 79–97)
MODALITY: ABNORMAL
MONOCYTES # BLD AUTO: 0.5 10*3/MM3 (ref 0.1–0.9)
MONOCYTES NFR BLD AUTO: 2.9 % (ref 5–12)
NEUTROPHILS # BLD AUTO: 16.1 10*3/MM3 (ref 1.7–7)
NEUTROPHILS NFR BLD AUTO: 89.6 % (ref 42.7–76)
NRBC BLD AUTO-RTO: 0 /100 WBC (ref 0–0.2)
PCO2 BLDA: 26.9 MM HG (ref 35–48)
PCO2 BLDA: 32.6 MM HG (ref 35–48)
PCO2 BLDA: 33.2 MM HG (ref 35–48)
PH BLDA: 7.3 PH UNITS (ref 7.35–7.45)
PH BLDA: 7.36 PH UNITS (ref 7.35–7.45)
PH BLDA: 7.39 PH UNITS (ref 7.35–7.45)
PLATELET # BLD AUTO: 336 10*3/MM3 (ref 140–450)
PMV BLD AUTO: 7.4 FL (ref 6–12)
PO2 BLDA: 80.4 MM HG (ref 83–108)
PO2 BLDA: 86.5 MM HG (ref 83–108)
PO2 BLDA: 97.7 MM HG (ref 83–108)
POTASSIUM BLD-SCNC: 3.9 MMOL/L (ref 3.5–5.2)
RBC # BLD AUTO: 2.78 10*6/MM3 (ref 3.77–5.28)
SAO2 % BLDCOA: 95.5 % (ref 94–98)
SAO2 % BLDCOA: 96.8 % (ref 94–98)
SAO2 % BLDCOA: 96.9 % (ref 94–98)
SODIUM BLD-SCNC: 140 MMOL/L (ref 136–145)
WBC NRBC COR # BLD: 18 10*3/MM3 (ref 3.4–10.8)

## 2020-06-01 PROCEDURE — 83605 ASSAY OF LACTIC ACID: CPT | Performed by: NURSE PRACTITIONER

## 2020-06-01 PROCEDURE — 94799 UNLISTED PULMONARY SVC/PX: CPT

## 2020-06-01 PROCEDURE — 25010000002 PIPERACILLIN SOD-TAZOBACTAM PER 1 G: Performed by: INTERNAL MEDICINE

## 2020-06-01 PROCEDURE — 25010000002 ENOXAPARIN PER 10 MG: Performed by: PHYSICIAN ASSISTANT

## 2020-06-01 PROCEDURE — 87070 CULTURE OTHR SPECIMN AEROBIC: CPT | Performed by: INTERNAL MEDICINE

## 2020-06-01 PROCEDURE — 25010000002 ONDANSETRON PER 1 MG: Performed by: NURSE PRACTITIONER

## 2020-06-01 PROCEDURE — 83735 ASSAY OF MAGNESIUM: CPT | Performed by: NURSE PRACTITIONER

## 2020-06-01 PROCEDURE — 82962 GLUCOSE BLOOD TEST: CPT

## 2020-06-01 PROCEDURE — 85025 COMPLETE CBC W/AUTO DIFF WBC: CPT | Performed by: NURSE PRACTITIONER

## 2020-06-01 PROCEDURE — 25010000002 MORPHINE PER 10 MG: Performed by: NURSE PRACTITIONER

## 2020-06-01 PROCEDURE — 80048 BASIC METABOLIC PNL TOTAL CA: CPT | Performed by: NURSE PRACTITIONER

## 2020-06-01 PROCEDURE — 87205 SMEAR GRAM STAIN: CPT | Performed by: INTERNAL MEDICINE

## 2020-06-01 PROCEDURE — 25010000002 PROMETHAZINE PER 50 MG: Performed by: NURSE PRACTITIONER

## 2020-06-01 PROCEDURE — 99024 POSTOP FOLLOW-UP VISIT: CPT | Performed by: SURGERY

## 2020-06-01 RX ORDER — PROMETHAZINE HYDROCHLORIDE 25 MG/ML
12.5 INJECTION, SOLUTION INTRAMUSCULAR; INTRAVENOUS ONCE
Status: DISCONTINUED | OUTPATIENT
Start: 2020-06-01 | End: 2020-06-01

## 2020-06-01 RX ADMIN — PIPERACILLIN AND TAZOBACTAM 4.5 G: 4; .5 INJECTION, POWDER, FOR SOLUTION INTRAVENOUS at 17:26

## 2020-06-01 RX ADMIN — HYDROCODONE BITARTRATE AND ACETAMINOPHEN 1 TABLET: 5; 325 TABLET ORAL at 05:53

## 2020-06-01 RX ADMIN — MORPHINE SULFATE 2 MG: 4 INJECTION INTRAVENOUS at 02:04

## 2020-06-01 RX ADMIN — ENOXAPARIN SODIUM 40 MG: 40 INJECTION SUBCUTANEOUS at 16:33

## 2020-06-01 RX ADMIN — ONDANSETRON 4 MG: 2 INJECTION INTRAMUSCULAR; INTRAVENOUS at 13:24

## 2020-06-01 RX ADMIN — SODIUM CHLORIDE 12.5 MG: 900 INJECTION, SOLUTION INTRAVENOUS at 17:26

## 2020-06-01 RX ADMIN — PANTOPRAZOLE SODIUM 40 MG: 40 INJECTION, POWDER, FOR SOLUTION INTRAVENOUS at 05:53

## 2020-06-01 RX ADMIN — ONDANSETRON 4 MG: 2 INJECTION INTRAMUSCULAR; INTRAVENOUS at 23:44

## 2020-06-01 RX ADMIN — PIPERACILLIN AND TAZOBACTAM 4.5 G: 4; .5 INJECTION, POWDER, FOR SOLUTION INTRAVENOUS at 02:03

## 2020-06-01 RX ADMIN — ONDANSETRON 4 MG: 2 INJECTION INTRAMUSCULAR; INTRAVENOUS at 06:37

## 2020-06-01 RX ADMIN — PIPERACILLIN AND TAZOBACTAM 4.5 G: 4; .5 INJECTION, POWDER, FOR SOLUTION INTRAVENOUS at 10:27

## 2020-06-01 RX ADMIN — Medication 10 ML: at 08:56

## 2020-06-01 RX ADMIN — Medication 10 ML: at 20:19

## 2020-06-01 RX ADMIN — HYDROCODONE BITARTRATE AND ACETAMINOPHEN 1 TABLET: 5; 325 TABLET ORAL at 13:09

## 2020-06-01 NOTE — PLAN OF CARE
Problem: Patient Care Overview  Goal: Plan of Care Review  Outcome: Ongoing (interventions implemented as appropriate)  Flowsheets  Taken 6/1/2020 3093  Progress: improving  Outcome Summary: Patient off all pressors. highest temp was 99.0 today. She still has persistent nausea and required IV phenergan today.  Taken 6/1/2020 1130  Plan of Care Reviewed With: patient

## 2020-06-01 NOTE — PROGRESS NOTES
KPA/PULM/CC PROGRESS NOTE     [place]  The following information was obtained primarily from review of documentation and discussion with Dr. Ruvalcaba as the patient was postanesthesia, drowsy and confused at the time of initial examination in PACU.  Pt reported to the ED provider that she had experienced 5 days of progressive worsening of sharp, bilateral lower quadrant abdominal pain.  The patient was evaluated at [facility] emergency department earlier this week during which time a CT was performed that was reportedly negative with the exception of ovarian cyst and the patient was discharged home with Cipro and Flagyl.  The patient reportedly started having much worse abdominal pain last night around 8 PM and around 9 PM started nausea, vomiting, and diarrhea.  She was evaluated at [facility] emergency department earlier this morning for her complaints of following evaluation was discharged home on and instructed to continue antibiotics.  No diagnostics were performed at that time.  The patient felt worse throughout the day and presented to [facility] emergency department for evaluation.  CT scan of the abdomen revealed findings consistent with acute ruptured appendicitis.  Blood pressure was noted to be 70/30 and the patient received 30 mL/kg IV fluid resuscitation.  She also received empiric antibiotics with Zosyn after blood cultures and urine culture were obtained.  The patient was taken emergently to the OR where she had exploratory lap with open appendectomy and washout of peritonitis with free-flowing purulent and feculent matter contamination noted.  Postoperatively the patient extubated easily.  Since admission she has received 7 L IV fluid resuscitation with normal saline as well as 500 mL of colloid.  Postoperatively she continued to be hypotensive and is currently on a kurtis-drip.     In the emergency department the patient reported that she had also had some dysuria and has been  taking Pyridium at home.  She denied hematemesis, hematochezia, melena.  She reported some mild chest pain and shortness of air.  She also reported that she had lightheadedness with subjective fever and chills.     SUBJECTIVE    5/31: Patient reports feeling much better today.  Continues to have mild abdominal pain which is improving.  No shortness of air reported on room air.  No chest pain.  Continues to require Jose Rafael-Synephrine drip for hypotension    6/1:  In bed, no new issues.  Feels ok.  Pain is controlled.  Remains off pressor    OBJECTIVE    Vitals:    06/01/20 0659 06/01/20 0710 06/01/20 0740 06/01/20 0825   BP: 142/77 118/71 107/75 147/72   Pulse: 119 114 110 109   Resp: 18      Temp:       TempSrc:       SpO2: 95% 94% 94% 95%   Weight:       Height:          Intake/Output last 3 shifts:  I/O last 3 completed shifts:  In: 3356 [P.O.:1960; I.V.:1396]  Out: 2140 [Urine:2050; Drains:90]  Intake/Output this shift:  I/O this shift:  In: -   Out: 205 [Urine:125; Drains:80]    General Appearance:  Alert, cooperative, no distress, appears stated age  Head:  Normocephalic, without obvious abnormality, atraumatic  Eyes:  PERRL, conjunctivae/corneas clear, EOM's intact     Neck:  Supple,  no JVD     Lungs:   Clear to auscultation bilaterally, respirations unlabored  Chest wall:  No tenderness, symmetrical  Heart: Sinus tachycardia, S1 and S2 normal, no murmur, rub   or gallop  Abdomen:  Soft, mild postsurgical discomfort, bowel sounds active all four quadrants, obese.  Dressings and LACHELLE drain  Extremities:  Extremities normal, no cyanosis or edema  Skin:  No rashes or lesions.  Abdomen with postsurgical dressings in place  Neurologic:   Alert and oriented, no focal deficits    Scheduled Meds:    enoxaparin 40 mg Subcutaneous Daily   insulin lispro 0-7 Units Subcutaneous 4x Daily With Meals & Nightly   pantoprazole 40 mg Intravenous Q AM   piperacillin-tazobactam 4.5 g Intravenous Q8H   sodium chloride 10 mL Intravenous  Q12H       Continuous Infusions:    phenylephrine 0.5-3 mcg/kg/min Last Rate: Stopped (05/31/20 1308)       PRN Meds:•  acetaminophen  •  dextrose  •  dextrose  •  glucagon (human recombinant)  •  HYDROcodone-acetaminophen  •  insulin lispro **AND** insulin lispro  •  ipratropium-albuterol  •  Morphine  •  ondansetron **OR** ondansetron  •  potassium chloride  •  sodium chloride     LABS    CBC  Results from last 7 days   Lab Units 06/01/20  0600 05/31/20  1336 05/31/20  0325 05/30/20  1009   WBC 10*3/mm3 18.00* 14.20* 17.00* 3.40   RBC 10*6/mm3 2.78* 2.87* 3.29* 3.79   HEMOGLOBIN g/dL 8.4* 8.4* 9.7* 11.2*   HEMATOCRIT % 24.3* 25.4* 30.0* 34.0   MCV fL 87.3 88.6 91.1 89.8   PLATELETS 10*3/mm3 336 313 391 391       CMP   Results from last 7 days   Lab Units 06/01/20  0600 05/31/20  1336 05/31/20  0400 05/30/20  1009   SODIUM mmol/L 140 141 138 132*   POTASSIUM mmol/L 3.9 3.4* 3.9 3.5   CHLORIDE mmol/L 111* 113* 111* 102   CO2 mmol/L 21.0* 18.0* 16.0* 16.0*   BUN mg/dL 9 9 10 15   CREATININE mg/dL 0.50* 0.58 0.70 1.06*   GLUCOSE mg/dL 124* 177* 158* 202*   ALBUMIN g/dL  --  2.90*  --  2.90*   BILIRUBIN mg/dL  --  0.4  --  0.9   ALK PHOS U/L  --  38*  --  44   AST (SGOT) U/L  --  23  --  9   ALT (SGPT) U/L  --  12  --  7   LIPASE U/L  --   --   --  9*       TROPONIN  Results from last 7 days   Lab Units 05/30/20  1009   TROPONIN T ng/mL <0.010       CoAg  Results from last 7 days   Lab Units 05/30/20  1009   INR  1.06   APTT seconds 24.4       ABG        Microbiology  Microbiology Results (last 10 days)     Procedure Component Value - Date/Time    Respiratory Panel, PCR - Swab, Nasopharynx [[id number]]  (Normal) Collected:  05/30/20 1836    Lab Status:  Final result Specimen:  Swab from Nasopharynx Updated:  05/30/20 2051     ADENOVIRUS, PCR Not Detected     Coronavirus 229E Not Detected     Coronavirus HKU1 Not Detected     Coronavirus NL63 Not Detected     Coronavirus OC43 Not Detected     Human Metapneumovirus Not  Detected     Human Rhinovirus/Enterovirus Not Detected     Influenza B PCR Not Detected     Parainfluenza Virus 1 Not Detected     Parainfluenza Virus 2 Not Detected     Parainfluenza Virus 3 Not Detected     Parainfluenza Virus 4 Not Detected     Bordetella pertussis pcr Not Detected     Influenza A H1 2009 PCR Not Detected     Chlamydophila pneumoniae PCR Not Detected     Mycoplasma pneumo by PCR Not Detected     Influenza A PCR Not Detected     Influenza A H3 Not Detected     Influenza A H1 Not Detected     RSV, PCR Not Detected    Narrative:       The coronavirus on the RVP is NOT COVID-19 and is NOT indicative of infection with COVID-19.     MRSA Screen, PCR (Inpatient) - Swab, Nares [[id number]]  (Normal) Collected:  05/30/20 1835    Lab Status:  Final result Specimen:  Swab from Nares Updated:  05/30/20 2040     MRSA PCR No MRSA Detected    COVID PRE-OP / PRE-PROCEDURE SCREENING ORDER (NO ISOLATION) - Swab, Nasopharynx [[id number]] Collected:  05/30/20 1232    Lab Status:  Final result Specimen:  Swab from Nasopharynx Updated:  05/30/20 1328    Narrative:       The following orders were created for panel order COVID PRE-OP / PRE-PROCEDURE SCREENING ORDER (NO ISOLATION) - Swab, Nasopharynx.  Procedure                               Abnormality         Status                     ---------                               -----------         ------                     COVID-19,CEPHEID,COR/VALERIA...[[id number]]  Normal              Final result                 Please view results for these tests on the individual orders.    COVID-19,CEPHEID,COR/VALERIA/PAD IN-HOUSE(OR EMERGENT/ADD-ON),NP SWAB IN TRANSPORT MEDIA 3-4 HR TAT - Swab, Nasopharynx [[id number]]  (Normal) Collected:  05/30/20 1232    Lab Status:  Final result Specimen:  Swab from Nasopharynx Updated:  05/30/20 1328     COVID19 Not Detected    Blood Culture - Blood, Arm, Right [[id number]] Collected:  05/30/20 1018    Lab Status:  Preliminary result Specimen:  Blood from  Arm, Right Updated:  05/31/20 1030     Blood Culture No growth at 24 hours    Blood Culture - Blood, Arm, Left [[id number]] Collected:  05/30/20 1009    Lab Status:  Preliminary result Specimen:  Blood from Arm, Left Updated:  05/31/20 1030     Blood Culture No growth at 24 hours          IMAGING & OTHER STUDIES    Imaging Results (Last 72 Hours)     Procedure Component Value Units Date/Time    XR Chest 1 View [[id number]] Collected:  05/31/20 1356     Updated:  05/31/20 1400    Narrative:       DATE OF EXAM:  5/31/2020 1:29 PM     PROCEDURE:  XR CHEST 1 VW-     INDICATIONS:  Tachypnea, perforated appendix, status post appendectomy, hypoxia.      COMPARISON:  05/30/2020.     TECHNIQUE:   Single radiographic view of the chest was obtained.     FINDINGS:  The lung volumes are diminished. There is bilateral basilar densities  felt to represent atelectasis similar to yesterday's exam. The patient  may have new mild pulmonary vascular congestion. There are no definite  pleural effusions.  There has been interval placement of a right upper  extremity PICC line with the tip terminating in the right atrium.       Impression:          1. Low lung volumes with bilateral basilar subsegmental atelectasis.  2. The patient may have new pulmonary vascular congestion.     Electronically Signed By-Nitin Garcia On:5/31/2020 1:58 PM  This report was finalized on [id number] by  Nitin Garcia, .    CT Abdomen Pelvis With Contrast [[id number]] Collected:  05/30/20 1204     Updated:  05/30/20 1212    Narrative:       DATE OF EXAM:  5/30/2020 11:49 AM     PROCEDURE:  CT ABDOMEN PELVIS W CONTRAST-     INDICATIONS:   Diffuse abdominal pain with nausea.     COMPARISON:   10/19/2015.     TECHNIQUE:  Routine transaxial slices were obtained through the abdomen and pelvis  after the intravenous administration of 100 mL of Isovue 370.  Reconstructed coronal and sagittal images were also obtained. Automated  exposure control and iterative construction  methods were used.     FINDINGS:  There are findings consistent with ruptured acute appendicitis. There  are marked inflammatory changes seen within the periappendiceal fat. The  appendix is a difficult to visualize. The tip of the appendix is best  seen anterior to the right common iliac artery on images . There  are several gas bubbles throughout the intra-abdominal fat in the right  lower quadrant. There are several poorly organized partially  rim-enhancing fluid collections within the lower pelvis. These are  located within the pelvic cul-de-sac and anterior to the uterus. There  is also free fluid within both paracolic gutters. Small pockets of fluid  are seen in the left upper quadrant adjacent to the small bowel and  there is free fluid adjacent to the tip of the right lobe of the liver.  I would recommend surgical consultation. There is some slight wall  thickening involving a few of the small bowel loops felt to represent a  reactive enteritis. The uterus, adnexal structures, and urinary bladder  are unremarkable. The liver, gallbladder, adrenal glands, pancreas, and  spleen are normal. There is a benign cyst in the upper pole of the right  kidney. The left kidney is normal. There are some mildly enlarged  reactive lymph nodes within the right lower quadrant small bowel  mesentery. There is subsegmental atelectasis in both lower lobes. There  is normal vascular enhancement. There are no suspicious osteolytic or  sclerotic lesions within the bony structures.       Impression:          1. There are findings consistent with ruptured acute appendicitis.  2. There are gas bubbles within the intra-abdominal fat. There are  several poorly organized partially rim-enhancing fluid collections  within the lower pelvis. There is no discrete organized abscess. There  is free fluid within the abdomen and pelvis as described above.  3. Slight thickening of the wall of small bowel probably representing  reactive  enteritis.  4. Bilateral basilar subsegmental atelectasis.  5. The abnormal results were discussed with GERTRUDIS Reese by  telephone. I would recommend surgical consultation.     Electronically Signed By-Nitin Garcia On:5/30/2020 12:10 PM  This report was finalized on [id number] by  Nitin Garcia, .    XR Chest 1 View [[id number]] Collected:  05/30/20 1040     Updated:  05/30/20 1043    Narrative:       DATE OF EXAM:  5/30/2020 10:20 AM     PROCEDURE:  XR CHEST 1 VW-     INDICATIONS:  Severe sepsis, vomiting, abdominal pain.      COMPARISON:  No Comparisons Available     TECHNIQUE:   Single radiographic view of the chest was obtained.     FINDINGS:  The heart size is normal. The pulmonary vascular markings are normal.  The lung volumes are diminished. There are patchy basilar densities felt  to represent subsegmental atelectasis.  The bony thorax is normal for  age.       Impression:       Low lung volumes with bilateral basilar subsegmental atelectasis.     Electronically Signed By-Nitin Garcia On:5/30/2020 10:41 AM  This report was finalized on [id number] by  Nitin Garcia, .              ASSESSMENT    Septic shock, intra-abdominal source with ruptured appendix and peritonitis as well as UTI  -Patient received >30 mL/kg IV resuscitation (7 L normal saline in addition to 500 mL colloid)  -OFF Jose Rafael-Synephrine  -Blood cultures no growth   -Lactate 2.6, normalized on serial monitoring  -C-reactive protein 26.41  -COVID-19 test negative  -Chest x-ray reviewed  -Worse Leukocytosis. May need antifungals. Consult ID.     Acute kidney injury, resolved  -Likely ATN secondary to hypotension and hypovolemia  -Patient has received IV fluid resuscitation  -Avoid further hypotension  -Avoid nephrotoxic agents  -Monitor and trend labs     Acute hypoxic respiratory failure postoperatively, resolved  -Likely secondary to abdominal pain with low inspiratory effort, bilateral atelectasis noted on chest x-ray and CT  -I-S and flutter  valve ordered for pulmonary hygiene  -DuoNebs PRN  -on 2 liters nasal cannula      Hyperglycemia, no known history of diabetes.  Possibly reactive  -Sliding-scale insulin for tight glycemic control  -Hemoglobin A1c pending     Acute anemia, present preop  -Serial monitoring, stable postop  -EBL 50 mL     UTI  -Culture pending  -Antibiotics as above     PUD prophylaxis with Protonix  DVT prophylaxis with SCDs       Ok to increase activity per sx   Keep in ICU today     Attending physician statement:  Above note scribed by nurse practitioner for me and later reviewed for accuracy. I've examined the patient and reviewed all labs and images.   I have directly participated in the evaluation and management of this patient.  Kirsten edwards MD

## 2020-06-01 NOTE — PROGRESS NOTES
Discharge Planning Assessment  [facility]     Patient Name: Suzie Duvall  MRN: [id number]  Today's Date: 6/1/2020    Admit Date: 5/30/2020    Discharge Needs Assessment     Row Name 06/01/20 1310       Living Environment    Primary Care Provided by  self;child(elaine)    Quality of Family Relationships  supportive    Able to Return to Prior Arrangements  yes       Resource/Environmental Concerns    Transportation Concerns  car, none       Transition Planning    Patient/Family Anticipates Transition to  home       Discharge Needs Assessment    Readmission Within the Last 30 Days  no previous admission in last 30 days    Equipment Currently Used at Home  none    Equipment Needed After Discharge  none        Discharge Plan     Row Name 06/01/20 1313       Plan    Plan  D/C Plan : Pending . Pt watch for need of home IV antibitoics .     Plan Comments  Pt was taken to OR for a exploratory lap for a ruptured appy with peritonitis . Pt also with UTI . Remains in ICU on a Jose Rafael gtt .         Destination      Coordination has not been started for this encounter.      Durable Medical Equipment      Coordination has not been started for this encounter.      Dialysis/Infusion      Coordination has not been started for this encounter.      Home Medical Care      Coordination has not been started for this encounter.      Therapy      Coordination has not been started for this encounter.      Community Resources      Coordination has not been started for this encounter.          Demographic Summary     Row Name 06/01/20 1310       General Information    Admission Type  inpatient    Arrived From  emergency department    Required Notices Provided  Important Message from Medicare    Preferred Language  English     Used During This Interaction  no        Functional Status     Row Name 06/01/20 1310       Functional Status    Usual Activity Tolerance  good    Current Activity Tolerance  moderate       Functional Status, IADL     Medications  independent    Meal Preparation  independent    Housekeeping  independent    Laundry  independent    Shopping  independent       Mental Status    General Appearance WDL  WDL        Psychosocial    No documentation.       Abuse/Neglect    No documentation.       Legal    No documentation.       Substance Abuse    No documentation.       Patient Forms    No documentation.           Norma Tyson RN

## 2020-06-01 NOTE — PROGRESS NOTES
General Surgery Progress Note     LOS: 2 days   Patient Care Team:  Kaylene Schroeder MD as PCP - General (Family Medicine)    Subjective     Chief Complaint   Patient presents with   • Abdominal Pain       Interval History:   Patient feeling better.  She has not passed any gas yet.  She is tolerating some clear liquids.    Objective     Vital Signs  Temp:  [97.8 °F (36.6 °C)-98.8 °F (37.1 °C)] 97.9 °F (36.6 °C)  Heart Rate:  [106-132] 106  Resp:  [18-33] 18  BP: ()/() 113/84    Physical Exam   Abdominal:   Soft, incision clean and dry, LACHELLE serous       Review of Systems     Results Review:    Lab Results (last 24 hours)     Procedure Component Value Units Date/Time    Tissue Pathology Exam [[id number]] Collected:  05/30/20 1623    Specimen:  Tissue from Large Intestine, Appendix Updated:  06/01/20 0844    POC Glucose Once [[id number]]  (Abnormal) Collected:  06/01/20 0800    Specimen:  Blood Updated:  06/01/20 0809     Glucose 119 mg/dL      Comment: Serial Number: [id number]:  [id number]       Magnesium [[id number]]  (Normal) Collected:  06/01/20 0600    Specimen:  Blood Updated:  06/01/20 0626     Magnesium 2.2 mg/dL     Basic Metabolic Panel [[id number]]  (Abnormal) Collected:  06/01/20 0600    Specimen:  Blood Updated:  06/01/20 0625     Glucose 124 mg/dL      BUN 9 mg/dL      Creatinine 0.50 mg/dL      Sodium 140 mmol/L      Potassium 3.9 mmol/L      Chloride 111 mmol/L      CO2 21.0 mmol/L      Calcium 8.2 mg/dL      eGFR Non African Amer 137 mL/min/1.73      BUN/Creatinine Ratio 18.0     Anion Gap 8.0 mmol/L     Narrative:       GFR Normal >60  Chronic Kidney Disease <60  Kidney Failure <15      CBC & Differential [[id number]] Collected:  06/01/20 0600    Specimen:  Blood Updated:  06/01/20 0607    Narrative:       The following orders were created for panel order CBC & Differential.  Procedure                               Abnormality         Status                     ---------                                -----------         ------                     CBC Auto Differential[[id number]]        Abnormal            Final result                 Please view results for these tests on the individual orders.    CBC Auto Differential [[id number]]  (Abnormal) Collected:  06/01/20 0600    Specimen:  Blood Updated:  06/01/20 0607     WBC 18.00 10*3/mm3      RBC 2.78 10*6/mm3      Hemoglobin 8.4 g/dL      Hematocrit 24.3 %      MCV 87.3 fL      MCH 30.2 pg      MCHC 34.6 g/dL      RDW 14.3 %      RDW-SD 44.2 fl      MPV 7.4 fL      Platelets 336 10*3/mm3      Neutrophil % 89.6 %      Lymphocyte % 7.2 %      Monocyte % 2.9 %      Eosinophil % 0.1 %      Basophil % 0.2 %      Neutrophils, Absolute 16.10 10*3/mm3      Lymphocytes, Absolute 1.30 10*3/mm3      Monocytes, Absolute 0.50 10*3/mm3      Eosinophils, Absolute 0.00 10*3/mm3      Basophils, Absolute 0.00 10*3/mm3      nRBC 0.0 /100 WBC     POC Glucose Once [[id number]]  (Abnormal) Collected:  05/31/20 1746    Specimen:  Blood Updated:  05/31/20 1747     Glucose 126 mg/dL      Comment: Serial Number: [id number]:  [id number]       Scan Slide [[id number]] Collected:  05/31/20 1336    Specimen:  Blood Updated:  05/31/20 1439     Scan Slide --     Comment: See Manual Differential Results       Manual Differential [[id number]]  (Abnormal) Collected:  05/31/20 1336    Specimen:  Blood Updated:  05/31/20 1439     Neutrophil % 55.0 %      Lymphocyte % 10.0 %      Monocyte % 1.0 %      Bands %  27.0 %      Metamyelocyte % 4.0 %      Myelocyte % 3.0 %      Neutrophils Absolute 11.64 10*3/mm3      Lymphocytes Absolute 1.42 10*3/mm3      Monocytes Absolute 0.14 10*3/mm3      RBC Morphology Normal     Dohle Bodies Present     Toxic Granulation Mod/2+     Vacuolated Neutrophils Slight/1+     Platelet Morphology Normal    CBC & Differential [[id number]] Collected:  05/31/20 1336    Specimen:  Blood Updated:  05/31/20 1439    Narrative:       The following orders were  created for panel order CBC & Differential.  Procedure                               Abnormality         Status                     ---------                               -----------         ------                     CBC Auto Differential[[id number]]        Abnormal            Final result                 Please view results for these tests on the individual orders.    CBC Auto Differential [[id number]]  (Abnormal) Collected:  05/31/20 1336    Specimen:  Blood Updated:  05/31/20 1439     WBC 14.20 10*3/mm3      RBC 2.87 10*6/mm3      Hemoglobin 8.4 g/dL      Hematocrit 25.4 %      MCV 88.6 fL      MCH 29.3 pg      MCHC 33.0 g/dL      RDW 14.2 %      RDW-SD 44.6 fl      MPV 7.2 fL      Platelets 313 10*3/mm3     Comprehensive Metabolic Panel [[id number]]  (Abnormal) Collected:  05/31/20 1336    Specimen:  Blood Updated:  05/31/20 1410     Glucose 177 mg/dL      BUN 9 mg/dL      Creatinine 0.58 mg/dL      Sodium 141 mmol/L      Potassium 3.4 mmol/L      Chloride 113 mmol/L      CO2 18.0 mmol/L      Calcium 7.6 mg/dL      Total Protein 5.7 g/dL      Albumin 2.90 g/dL      ALT (SGPT) 12 U/L      AST (SGOT) 23 U/L      Alkaline Phosphatase 38 U/L      Total Bilirubin 0.4 mg/dL      eGFR Non African Amer 116 mL/min/1.73      Globulin 2.8 gm/dL      A/G Ratio 1.0 g/dL      BUN/Creatinine Ratio 15.5     Anion Gap 10.0 mmol/L     Narrative:       GFR Normal >60  Chronic Kidney Disease <60  Kidney Failure <15      Lactic Acid, Plasma [[id number]]  (Abnormal) Collected:  05/31/20 1336    Specimen:  Blood Updated:  05/31/20 1405     Lactate 2.2 mmol/L     POC Glucose Once [[id number]]  (Abnormal) Collected:  05/31/20 1130    Specimen:  Blood Updated:  05/31/20 1135     Glucose 178 mg/dL      Comment: Serial Number: [id number]:  [id number]       Blood Culture - Blood, Arm, Right [[id number]] Collected:  05/30/20 1018    Specimen:  Blood from Arm, Right Updated:  05/31/20 1030     Blood Culture No growth at 24 hours    Blood  Culture - Blood, Arm, Left [[id number]] Collected:  05/30/20 1009    Specimen:  Blood from Arm, Left Updated:  05/31/20 1030     Blood Culture No growth at 24 hours        Imaging Results (Last 24 Hours)     Procedure Component Value Units Date/Time    XR Chest 1 View [[id number]] Collected:  05/31/20 1356     Updated:  05/31/20 1400    Narrative:       DATE OF EXAM:  5/31/2020 1:29 PM     PROCEDURE:  XR CHEST 1 VW-     INDICATIONS:  Tachypnea, perforated appendix, status post appendectomy, hypoxia.      COMPARISON:  05/30/2020.     TECHNIQUE:   Single radiographic view of the chest was obtained.     FINDINGS:  The lung volumes are diminished. There is bilateral basilar densities  felt to represent atelectasis similar to yesterday's exam. The patient  may have new mild pulmonary vascular congestion. There are no definite  pleural effusions.  There has been interval placement of a right upper  extremity PICC line with the tip terminating in the right atrium.       Impression:          1. Low lung volumes with bilateral basilar subsegmental atelectasis.  2. The patient may have new pulmonary vascular congestion.     Electronically Signed By-Nitin Garcia On:5/31/2020 1:58 PM  This report was finalized on [id number] by  Nitin Garcia, .          I reviewed the patient's new clinical results.    Medication Review:    Current Facility-Administered Medications:   •  acetaminophen (TYLENOL) tablet 650 mg, 650 mg, Oral, Q6H PRN, Joshua Suh APRN, 650 mg at 05/30/20 2111  •  dextrose (D50W) 25 g/ 50mL Intravenous Solution 25 g, 25 g, Intravenous, Q15 Min PRN, Day, Marilin, APRN  •  dextrose (GLUTOSE) oral gel 15 g, 15 g, Oral, Q15 Min PRN, Day, Marilin, APRN  •  enoxaparin (LOVENOX) syringe 40 mg, 40 mg, Subcutaneous, Daily, Emilia Mathur PA-C, 40 mg at 05/31/20 1634  •  glucagon (human recombinant) (GLUCAGEN DIAGNOSTIC) injection 1 mg, 1 mg, Subcutaneous, Q15 Min PRN, Day, Marilin, APRN  •  HYDROcodone-acetaminophen (NORCO) 5-325  MG per tablet 1 tablet, 1 tablet, Oral, Q6H PRN, Day, Marilin, APRN, 1 tablet at 06/01/20 0553  •  insulin lispro (humaLOG) injection 0-7 Units, 0-7 Units, Subcutaneous, 4x Daily With Meals & Nightly **AND** insulin lispro (humaLOG) injection 0-7 Units, 0-7 Units, Subcutaneous, PRN, Joshua Suh, MORRIS  •  ipratropium-albuterol (DUO-NEB) nebulizer solution 3 mL, 3 mL, Nebulization, Q2H PRN, Day, Marilin, APRN  •  Morphine sulfate (PF) injection 2 mg, 2 mg, Intravenous, Q4H PRN, Joshua Suh, MORRIS, 2 mg at 06/01/20 0204  •  ondansetron (ZOFRAN) tablet 4 mg, 4 mg, Oral, Q6H PRN **OR** ondansetron (ZOFRAN) injection 4 mg, 4 mg, Intravenous, Q6H PRN, Day, Dawn, APRN, 4 mg at 06/01/20 0637  •  pantoprazole (PROTONIX) injection 40 mg, 40 mg, Intravenous, Q AM, Day, Marilin, APRN, 40 mg at 06/01/20 0553  •  piperacillin-tazobactam (ZOSYN) IVPB 4.5 g in 100 mL NS (CD), 4.5 g, Intravenous, Q8H, Truong Martínez MD, 4.5 g at 06/01/20 0203  •  potassium chloride 10 mEq in 100 mL IVPB, 10 mEq, Intravenous, Q1H PRN, Day, Marilin, APRN, Last Rate: 100 mL/hr at 05/31/20 2138, 10 mEq at 05/31/20 2138  •  sodium chloride 0.9 % flush 10 mL, 10 mL, Intravenous, Q12H, Day, Marilin, APRN, 10 mL at 06/01/20 0856  •  sodium chloride 0.9 % flush 10 mL, 10 mL, Intravenous, PRN, Day, Marilin, APRN    Assessment/Plan     Active Problems:    S/P appendectomy    Ruptured appendicitis      Status post open appendectomy and washout for severe ruptured appendicitis with significant peritonitis- await GI function.  Continue LACHELLE drain.  Continue antibiotics.  Have discussed with SANDEE CABA and I think another day in the ICU makes sense.    Plan for disposition: Hopefully to floor tomorrow.    Scarlet Ruvalcaba MD  06/01/20  10:10

## 2020-06-01 NOTE — CONSULTS
Infectious Diseases Consult Note    Referring Provider: Truong Martínez MD    Reason for Consultation: Perforated appendicitis and peritonitis    Patient Care Team:  Kaylene Schroeder MD as PCP - General (Family Medicine)    Chief complaint abdominal pain    Subjective     History of present illness:      This is a 39-year-old white female with no significant past medical history is been sick for almost 7 days with abdominal pain.  Patient went to the emergency room at [facility] in [facility] twice and she was told that she has UTI and ovarian cyst.  The patient eventually presented at the emergency room at [facility] on May 30, 2020 and she was found to have ruptured appendicitis.  She underwent exploratory laparotomy with open and by appendectomy and washout of peritonitis.  The patient was initially on pressors and she has been off pressors for 24 hours.  She is currently on IV Zosyn and she is tolerating the medication with no side effects.    Review of Systems   Review of Systems   Constitutional: Negative.    HENT: Negative.    Eyes: Negative.    Respiratory: Negative.    Cardiovascular: Negative.    Gastrointestinal: Positive for abdominal pain.   Genitourinary: Negative.    Musculoskeletal: Negative.    Skin: Negative.    Neurological: Negative.    Hematological: Negative.    Psychiatric/Behavioral: Negative.        Medications  Medications Prior to Admission   Medication Sig Dispense Refill Last Dose   • ciprofloxacin (CIPRO) 500 MG tablet Take 500 mg by mouth 2 (Two) Times a Day.   5/29/2020 at Unknown time   • metroNIDAZOLE (FLAGYL) 500 MG tablet Take 500 mg by mouth 3 (Three) Times a Day.   5/29/2020 at Unknown time       History  History reviewed. No pertinent past medical history.  Past Surgical History:   Procedure Laterality Date   • EXPLORATORY LAPAROTOMY N/A 5/30/2020    Procedure: LAPAROTOMY EXPLORATORY;  Surgeon: Scarlet Ruvalcaba MD;  Location: [facility]  OR;  Service: General;  Laterality: N/A;       Family History  History reviewed. No pertinent family history.    Social History   reports that she has never smoked. She has never used smokeless tobacco. She reports that she does not drink alcohol.    Allergies  Patient has no known allergies.    Objective     Vital Signs   Vital Signs (last 24 hours)       05/31 0700  -  06/01 0659 06/01 0700  -  06/01 1649   Most Recent    Temp (°F) 97.8 -  98.8    98.5 -  99.8     99.8 (37.7)    Heart Rate 108 -  132    103 -  125     109    Resp 18 -  33       18    BP 86/57 -  142/77    99/59 -  147/72     133/102    SpO2 (%) 91 -  100    92 -  97     95          Physical Exam:  Physical Exam   Constitutional: She is oriented to person, place, and time. She appears well-developed and well-nourished.   HENT:   Head: Normocephalic and atraumatic.   Eyes: Pupils are equal, round, and reactive to light. EOM are normal.   Neck: Normal range of motion. Neck supple.   Cardiovascular: Normal rate, regular rhythm and normal heart sounds.   Pulmonary/Chest: Effort normal and breath sounds normal. No respiratory distress. She has no wheezes. She has no rales.   Abdominal: Soft. Bowel sounds are normal. She exhibits no distension and no mass. There is tenderness. There is no rebound and no guarding.   Diminished bowel sounds.  Patient has LACHELLE drain with bloody cloudy fluid in the drain tube   Musculoskeletal: Normal range of motion. She exhibits no edema or deformity.   Neurological: She is alert and oriented to person, place, and time. No cranial nerve deficit.   Skin: Skin is warm. No rash noted. No erythema.   Psychiatric: She has a normal mood and affect.   Nursing note and vitals reviewed.      Microbiology  Microbiology Results (last 10 days)     Procedure Component Value - Date/Time    Respiratory Panel, PCR - Swab, Nasopharynx [[id number]]  (Normal) Collected:  05/30/20 1836    Lab Status:  Final result Specimen:  Swab from  Nasopharynx Updated:  05/30/20 2051     ADENOVIRUS, PCR Not Detected     Coronavirus 229E Not Detected     Coronavirus HKU1 Not Detected     Coronavirus NL63 Not Detected     Coronavirus OC43 Not Detected     Human Metapneumovirus Not Detected     Human Rhinovirus/Enterovirus Not Detected     Influenza B PCR Not Detected     Parainfluenza Virus 1 Not Detected     Parainfluenza Virus 2 Not Detected     Parainfluenza Virus 3 Not Detected     Parainfluenza Virus 4 Not Detected     Bordetella pertussis pcr Not Detected     Influenza A H1 2009 PCR Not Detected     Chlamydophila pneumoniae PCR Not Detected     Mycoplasma pneumo by PCR Not Detected     Influenza A PCR Not Detected     Influenza A H3 Not Detected     Influenza A H1 Not Detected     RSV, PCR Not Detected    Narrative:       The coronavirus on the RVP is NOT COVID-19 and is NOT indicative of infection with COVID-19.     MRSA Screen, PCR (Inpatient) - Swab, Nares [[id number]]  (Normal) Collected:  05/30/20 1835    Lab Status:  Final result Specimen:  Swab from Nares Updated:  05/30/20 2040     MRSA PCR No MRSA Detected    COVID PRE-OP / PRE-PROCEDURE SCREENING ORDER (NO ISOLATION) - Swab, Nasopharynx [[id number]] Collected:  05/30/20 1232    Lab Status:  Final result Specimen:  Swab from Nasopharynx Updated:  05/30/20 1328    Narrative:       The following orders were created for panel order COVID PRE-OP / PRE-PROCEDURE SCREENING ORDER (NO ISOLATION) - Swab, Nasopharynx.  Procedure                               Abnormality         Status                     ---------                               -----------         ------                     COVID-19,CEPHEID,COR/VALERIA...[[id number]]  Normal              Final result                 Please view results for these tests on the individual orders.    COVID-19,CEPHEID,COR/VALERIA/PAD IN-HOUSE(OR EMERGENT/ADD-ON),NP SWAB IN TRANSPORT MEDIA 3-4 HR TAT - Swab, Nasopharynx [[id number]]  (Normal) Collected:  05/30/20 1232    Lab  Status:  Final result Specimen:  Swab from Nasopharynx Updated:  05/30/20 1328     COVID19 Not Detected    Blood Culture - Blood, Arm, Right [[id number]] Collected:  05/30/20 1018    Lab Status:  Preliminary result Specimen:  Blood from Arm, Right Updated:  06/01/20 1030     Blood Culture No growth at 2 days    Blood Culture - Blood, Arm, Left [[id number]] Collected:  05/30/20 1009    Lab Status:  Preliminary result Specimen:  Blood from Arm, Left Updated:  06/01/20 1030     Blood Culture No growth at 2 days          Laboratory  Results from last 7 days   Lab Units 06/01/20  0600   WBC 10*3/mm3 18.00*   HEMOGLOBIN g/dL 8.4*   HEMATOCRIT % 24.3*   PLATELETS 10*3/mm3 336     Results from last 7 days   Lab Units 06/01/20  0600   SODIUM mmol/L 140   POTASSIUM mmol/L 3.9   CHLORIDE mmol/L 111*   CO2 mmol/L 21.0*   BUN mg/dL 9   CREATININE mg/dL 0.50*   GLUCOSE mg/dL 124*   CALCIUM mg/dL 8.2*     Results from last 7 days   Lab Units 06/01/20  0600   SODIUM mmol/L 140   POTASSIUM mmol/L 3.9   CHLORIDE mmol/L 111*   CO2 mmol/L 21.0*   BUN mg/dL 9   CREATININE mg/dL 0.50*   GLUCOSE mg/dL 124*   CALCIUM mg/dL 8.2*                   Radiology  Imaging Results (Last 72 Hours)     Procedure Component Value Units Date/Time    XR Chest 1 View [[id number]] Collected:  05/31/20 1356     Updated:  05/31/20 1400    Narrative:       DATE OF EXAM:  5/31/2020 1:29 PM     PROCEDURE:  XR CHEST 1 VW-     INDICATIONS:  Tachypnea, perforated appendix, status post appendectomy, hypoxia.      COMPARISON:  05/30/2020.     TECHNIQUE:   Single radiographic view of the chest was obtained.     FINDINGS:  The lung volumes are diminished. There is bilateral basilar densities  felt to represent atelectasis similar to yesterday's exam. The patient  may have new mild pulmonary vascular congestion. There are no definite  pleural effusions.  There has been interval placement of a right upper  extremity PICC line with the tip terminating in the right atrium.        Impression:          1. Low lung volumes with bilateral basilar subsegmental atelectasis.  2. The patient may have new pulmonary vascular congestion.     Electronically Signed By-Nitin Garcia On:5/31/2020 1:58 PM  This report was finalized on [id number] by  Nitin Garcia, .    CT Abdomen Pelvis With Contrast [[id number]] Collected:  05/30/20 1204     Updated:  05/30/20 1212    Narrative:       DATE OF EXAM:  5/30/2020 11:49 AM     PROCEDURE:  CT ABDOMEN PELVIS W CONTRAST-     INDICATIONS:   Diffuse abdominal pain with nausea.     COMPARISON:   10/19/2015.     TECHNIQUE:  Routine transaxial slices were obtained through the abdomen and pelvis  after the intravenous administration of 100 mL of Isovue 370.  Reconstructed coronal and sagittal images were also obtained. Automated  exposure control and iterative construction methods were used.     FINDINGS:  There are findings consistent with ruptured acute appendicitis. There  are marked inflammatory changes seen within the periappendiceal fat. The  appendix is a difficult to visualize. The tip of the appendix is best  seen anterior to the right common iliac artery on images . There  are several gas bubbles throughout the intra-abdominal fat in the right  lower quadrant. There are several poorly organized partially  rim-enhancing fluid collections within the lower pelvis. These are  located within the pelvic cul-de-sac and anterior to the uterus. There  is also free fluid within both paracolic gutters. Small pockets of fluid  are seen in the left upper quadrant adjacent to the small bowel and  there is free fluid adjacent to the tip of the right lobe of the liver.  I would recommend surgical consultation. There is some slight wall  thickening involving a few of the small bowel loops felt to represent a  reactive enteritis. The uterus, adnexal structures, and urinary bladder  are unremarkable. The liver, gallbladder, adrenal glands, pancreas, and  spleen are  normal. There is a benign cyst in the upper pole of the right  kidney. The left kidney is normal. There are some mildly enlarged  reactive lymph nodes within the right lower quadrant small bowel  mesentery. There is subsegmental atelectasis in both lower lobes. There  is normal vascular enhancement. There are no suspicious osteolytic or  sclerotic lesions within the bony structures.       Impression:          1. There are findings consistent with ruptured acute appendicitis.  2. There are gas bubbles within the intra-abdominal fat. There are  several poorly organized partially rim-enhancing fluid collections  within the lower pelvis. There is no discrete organized abscess. There  is free fluid within the abdomen and pelvis as described above.  3. Slight thickening of the wall of small bowel probably representing  reactive enteritis.  4. Bilateral basilar subsegmental atelectasis.  5. The abnormal results were discussed with GERTRUDIS Reese by  telephone. I would recommend surgical consultation.     Electronically Signed By-Nitin Garcia On:5/30/2020 12:10 PM  This report was finalized on [id number] by  Nitin Garcia, .    XR Chest 1 View [[id number]] Collected:  05/30/20 1040     Updated:  05/30/20 1043    Narrative:       DATE OF EXAM:  5/30/2020 10:20 AM     PROCEDURE:  XR CHEST 1 VW-     INDICATIONS:  Severe sepsis, vomiting, abdominal pain.      COMPARISON:  No Comparisons Available     TECHNIQUE:   Single radiographic view of the chest was obtained.     FINDINGS:  The heart size is normal. The pulmonary vascular markings are normal.  The lung volumes are diminished. There are patchy basilar densities felt  to represent subsegmental atelectasis.  The bony thorax is normal for  age.       Impression:       Low lung volumes with bilateral basilar subsegmental atelectasis.     Electronically Signed By-Nitin Garcia On:5/30/2020 10:41 AM  This report was finalized on [id number] by  Nitin Garcia, .           Cardiology      Results Review:  I have reviewed all clinical data, test, lab, and imaging results.       Schedule Meds    enoxaparin 40 mg Subcutaneous Daily   insulin lispro 0-7 Units Subcutaneous 4x Daily With Meals & Nightly   pantoprazole 40 mg Intravenous Q AM   piperacillin-tazobactam 4.5 g Intravenous Q8H   sodium chloride 10 mL Intravenous Q12H       Infusion Meds       PRN Meds  •  acetaminophen  •  dextrose  •  dextrose  •  glucagon (human recombinant)  •  HYDROcodone-acetaminophen  •  insulin lispro **AND** insulin lispro  •  ipratropium-albuterol  •  Morphine  •  ondansetron **OR** ondansetron  •  potassium chloride  •  sodium chloride      Assessment/Plan       Assessment    Ruptured appendicitis    Peritonitis secondary to above    Mild septic shock resolved currently off pressors    Plan    Continue IV Zosyn 4.5 g IV every 8 hours  Send fluid from the LACHELLE drain for culture  Supportive care  Labs in a.m.  The patient will need 2 weeks of antibiotics    Yfn Nobles MD  06/01/20  16:49      Note is dictated utilizing voice recognition software/Dragon

## 2020-06-02 ENCOUNTER — APPOINTMENT (OUTPATIENT)
Dept: GENERAL RADIOLOGY | Facility: HOSPITAL | Age: 40
End: 2020-06-02

## 2020-06-02 PROBLEM — A41.9 SEVERE SEPSIS WITH SEPTIC SHOCK: Status: RESOLVED | Noted: 2020-05-30 | Resolved: 2020-06-02

## 2020-06-02 PROBLEM — E66.9 OBESITY (BMI 30-39.9): Chronic | Status: ACTIVE | Noted: 2020-06-02

## 2020-06-02 PROBLEM — D72.829 LEUKOCYTOSIS: Status: ACTIVE | Noted: 2020-06-02

## 2020-06-02 PROBLEM — R65.21 SEVERE SEPSIS WITH SEPTIC SHOCK: Status: ACTIVE | Noted: 2020-05-30

## 2020-06-02 PROBLEM — J96.01 ACUTE RESPIRATORY FAILURE WITH HYPOXIA: Status: RESOLVED | Noted: 2020-05-30 | Resolved: 2020-06-02

## 2020-06-02 PROBLEM — R73.9 ACUTE HYPERGLYCEMIA: Status: ACTIVE | Noted: 2020-05-30

## 2020-06-02 PROBLEM — R11.2 NAUSEA AND VOMITING: Status: ACTIVE | Noted: 2020-06-02

## 2020-06-02 PROBLEM — R65.21 SEVERE SEPSIS WITH SEPTIC SHOCK: Status: RESOLVED | Noted: 2020-05-30 | Resolved: 2020-06-02

## 2020-06-02 PROBLEM — N30.00 ACUTE CYSTITIS WITHOUT HEMATURIA: Status: ACTIVE | Noted: 2020-05-30

## 2020-06-02 PROBLEM — N17.9 ACUTE KIDNEY INJURY: Status: ACTIVE | Noted: 2020-05-30

## 2020-06-02 PROBLEM — Z90.49 S/P APPENDECTOMY: Status: RESOLVED | Noted: 2020-05-30 | Resolved: 2020-06-02

## 2020-06-02 PROBLEM — N17.9 ACUTE KIDNEY INJURY: Status: RESOLVED | Noted: 2020-05-30 | Resolved: 2020-06-02

## 2020-06-02 PROBLEM — K65.0 ACUTE PERITONITIS: Status: ACTIVE | Noted: 2020-05-30

## 2020-06-02 PROBLEM — J96.01 ACUTE RESPIRATORY FAILURE WITH HYPOXIA: Status: ACTIVE | Noted: 2020-05-30

## 2020-06-02 PROBLEM — K65.0 ACUTE PERITONITIS: Status: ACTIVE | Noted: 2020-06-02

## 2020-06-02 PROBLEM — D64.9 NORMOCYTIC ANEMIA: Status: ACTIVE | Noted: 2020-05-30

## 2020-06-02 PROBLEM — A41.9 SEVERE SEPSIS WITH SEPTIC SHOCK: Status: ACTIVE | Noted: 2020-05-30

## 2020-06-02 PROBLEM — K35.20 ACUTE APPENDICITIS WITH PERFORATION AND GENERALIZED PERITONITIS: Status: RESOLVED | Noted: 2020-05-30 | Resolved: 2020-05-30

## 2020-06-02 PROBLEM — K35.201 ACUTE APPENDICITIS WITH PERFORATION AND GENERALIZED PERITONITIS: Status: RESOLVED | Noted: 2020-05-30 | Resolved: 2020-05-30

## 2020-06-02 LAB
ALBUMIN SERPL-MCNC: 2.9 G/DL (ref 3.5–5.2)
ALBUMIN/GLOB SERPL: 0.8 G/DL
ALP SERPL-CCNC: 94 U/L (ref 39–117)
ALT SERPL W P-5'-P-CCNC: 11 U/L (ref 1–33)
ANION GAP SERPL CALCULATED.3IONS-SCNC: 8 MMOL/L (ref 5–15)
AST SERPL-CCNC: 17 U/L (ref 1–32)
BACTERIA UR QL AUTO: ABNORMAL /HPF
BILIRUB SERPL-MCNC: 0.4 MG/DL (ref 0.2–1.2)
BILIRUB UR QL STRIP: ABNORMAL
BUN BLD-MCNC: 15 MG/DL (ref 6–20)
BUN/CREAT SERPL: 34.1 (ref 7–25)
CALCIUM SPEC-SCNC: 8.5 MG/DL (ref 8.6–10.5)
CHLORIDE SERPL-SCNC: 107 MMOL/L (ref 98–107)
CLARITY UR: ABNORMAL
CO2 SERPL-SCNC: 23 MMOL/L (ref 22–29)
COLOR UR: ABNORMAL
CREAT BLD-MCNC: 0.44 MG/DL (ref 0.57–1)
DEPRECATED RDW RBC AUTO: 43.3 FL (ref 37–54)
ERYTHROCYTE [DISTWIDTH] IN BLOOD BY AUTOMATED COUNT: 14 % (ref 12.3–15.4)
GFR SERPL CREATININE-BSD FRML MDRD: >150 ML/MIN/1.73
GLOBULIN UR ELPH-MCNC: 3.7 GM/DL
GLUCOSE BLD-MCNC: 125 MG/DL (ref 65–99)
GLUCOSE BLDC GLUCOMTR-MCNC: 102 MG/DL (ref 70–105)
GLUCOSE BLDC GLUCOMTR-MCNC: 116 MG/DL (ref 70–105)
GLUCOSE UR STRIP-MCNC: NEGATIVE MG/DL
HCT VFR BLD AUTO: 27.7 % (ref 34–46.6)
HGB BLD-MCNC: 9.6 G/DL (ref 12–15.9)
HGB UR QL STRIP.AUTO: ABNORMAL
HYALINE CASTS UR QL AUTO: ABNORMAL /LPF
KETONES UR QL STRIP: ABNORMAL
LAB AP CASE REPORT: NORMAL
LEUKOCYTE ESTERASE UR QL STRIP.AUTO: ABNORMAL
LYMPHOCYTES # BLD MANUAL: 1.27 10*3/MM3 (ref 0.7–3.1)
LYMPHOCYTES NFR BLD MANUAL: 5 % (ref 5–12)
LYMPHOCYTES NFR BLD MANUAL: 6 % (ref 19.6–45.3)
MAGNESIUM SERPL-MCNC: 2.1 MG/DL (ref 1.6–2.6)
MCH RBC QN AUTO: 30.1 PG (ref 26.6–33)
MCHC RBC AUTO-ENTMCNC: 34.5 G/DL (ref 31.5–35.7)
MCV RBC AUTO: 87.3 FL (ref 79–97)
METAMYELOCYTES NFR BLD MANUAL: 1 % (ref 0–0)
MONOCYTES # BLD AUTO: 1.06 10*3/MM3 (ref 0.1–0.9)
NEUTROPHILS # BLD AUTO: 18.57 10*3/MM3 (ref 1.7–7)
NEUTROPHILS NFR BLD MANUAL: 69 % (ref 42.7–76)
NEUTS BAND NFR BLD MANUAL: 19 % (ref 0–5)
NEUTS VAC BLD QL SMEAR: ABNORMAL
NITRITE UR QL STRIP: NEGATIVE
PATH REPORT.FINAL DX SPEC: NORMAL
PATH REPORT.GROSS SPEC: NORMAL
PH UR STRIP.AUTO: 6.5 [PH] (ref 5–8)
PLAT MORPH BLD: NORMAL
PLATELET # BLD AUTO: 403 10*3/MM3 (ref 140–450)
PMV BLD AUTO: 7.6 FL (ref 6–12)
POTASSIUM BLD-SCNC: 3.5 MMOL/L (ref 3.5–5.2)
POTASSIUM BLD-SCNC: 3.6 MMOL/L (ref 3.5–5.2)
PROT SERPL-MCNC: 6.6 G/DL (ref 6–8.5)
PROT UR QL STRIP: ABNORMAL
RBC # BLD AUTO: 3.18 10*6/MM3 (ref 3.77–5.28)
RBC # UR: ABNORMAL /HPF
RBC MORPH BLD: NORMAL
REF LAB TEST METHOD: ABNORMAL
SCAN SLIDE: NORMAL
SODIUM BLD-SCNC: 138 MMOL/L (ref 136–145)
SP GR UR STRIP: 1.03 (ref 1–1.03)
SQUAMOUS #/AREA URNS HPF: ABNORMAL /HPF
TOXIC GRANULATION: ABNORMAL
UROBILINOGEN UR QL STRIP: ABNORMAL
WBC NRBC COR # BLD: 21.1 10*3/MM3 (ref 3.4–10.8)
WBC UR QL AUTO: ABNORMAL /HPF

## 2020-06-02 PROCEDURE — 99221 1ST HOSP IP/OBS SF/LOW 40: CPT | Performed by: HOSPITALIST

## 2020-06-02 PROCEDURE — 25010000002 PIPERACILLIN SOD-TAZOBACTAM PER 1 G: Performed by: INTERNAL MEDICINE

## 2020-06-02 PROCEDURE — 25010000003 POTASSIUM CHLORIDE 10 MEQ/100ML SOLUTION: Performed by: NURSE PRACTITIONER

## 2020-06-02 PROCEDURE — 81001 URINALYSIS AUTO W/SCOPE: CPT | Performed by: NURSE PRACTITIONER

## 2020-06-02 PROCEDURE — 87086 URINE CULTURE/COLONY COUNT: CPT | Performed by: NURSE PRACTITIONER

## 2020-06-02 PROCEDURE — 25010000002 ONDANSETRON PER 1 MG: Performed by: NURSE PRACTITIONER

## 2020-06-02 PROCEDURE — 85007 BL SMEAR W/DIFF WBC COUNT: CPT | Performed by: NURSE PRACTITIONER

## 2020-06-02 PROCEDURE — 94799 UNLISTED PULMONARY SVC/PX: CPT

## 2020-06-02 PROCEDURE — 74018 RADEX ABDOMEN 1 VIEW: CPT

## 2020-06-02 PROCEDURE — 85025 COMPLETE CBC W/AUTO DIFF WBC: CPT | Performed by: NURSE PRACTITIONER

## 2020-06-02 PROCEDURE — 80053 COMPREHEN METABOLIC PANEL: CPT | Performed by: INTERNAL MEDICINE

## 2020-06-02 PROCEDURE — 83735 ASSAY OF MAGNESIUM: CPT | Performed by: NURSE PRACTITIONER

## 2020-06-02 PROCEDURE — 25010000002 ENOXAPARIN PER 10 MG: Performed by: PHYSICIAN ASSISTANT

## 2020-06-02 PROCEDURE — 82962 GLUCOSE BLOOD TEST: CPT

## 2020-06-02 PROCEDURE — 84132 ASSAY OF SERUM POTASSIUM: CPT | Performed by: HOSPITALIST

## 2020-06-02 RX ORDER — PROMETHAZINE HYDROCHLORIDE 12.5 MG/1
12.5 SUPPOSITORY RECTAL EVERY 6 HOURS PRN
Status: DISCONTINUED | OUTPATIENT
Start: 2020-06-02 | End: 2020-06-04 | Stop reason: HOSPADM

## 2020-06-02 RX ADMIN — PIPERACILLIN AND TAZOBACTAM 4.5 G: 4; .5 INJECTION, POWDER, FOR SOLUTION INTRAVENOUS at 02:48

## 2020-06-02 RX ADMIN — ONDANSETRON 4 MG: 2 INJECTION INTRAMUSCULAR; INTRAVENOUS at 08:49

## 2020-06-02 RX ADMIN — POTASSIUM CHLORIDE 10 MEQ: 7.46 INJECTION, SOLUTION INTRAVENOUS at 09:52

## 2020-06-02 RX ADMIN — Medication 10 ML: at 20:05

## 2020-06-02 RX ADMIN — POTASSIUM CHLORIDE 10 MEQ: 7.46 INJECTION, SOLUTION INTRAVENOUS at 05:37

## 2020-06-02 RX ADMIN — PIPERACILLIN AND TAZOBACTAM 4.5 G: 4; .5 INJECTION, POWDER, FOR SOLUTION INTRAVENOUS at 17:54

## 2020-06-02 RX ADMIN — ENOXAPARIN SODIUM 40 MG: 40 INJECTION SUBCUTANEOUS at 17:55

## 2020-06-02 RX ADMIN — ONDANSETRON 4 MG: 2 INJECTION INTRAMUSCULAR; INTRAVENOUS at 17:55

## 2020-06-02 RX ADMIN — PROMETHAZINE HYDROCHLORIDE 12.5 MG: 12.5 SUPPOSITORY RECTAL at 04:37

## 2020-06-02 RX ADMIN — POTASSIUM CHLORIDE 10 MEQ: 7.46 INJECTION, SOLUTION INTRAVENOUS at 08:49

## 2020-06-02 RX ADMIN — Medication 10 ML: at 08:50

## 2020-06-02 RX ADMIN — ACETAMINOPHEN 650 MG: 325 TABLET, FILM COATED ORAL at 21:17

## 2020-06-02 RX ADMIN — PANTOPRAZOLE SODIUM 40 MG: 40 INJECTION, POWDER, FOR SOLUTION INTRAVENOUS at 05:37

## 2020-06-02 RX ADMIN — POTASSIUM CHLORIDE 10 MEQ: 7.46 INJECTION, SOLUTION INTRAVENOUS at 06:45

## 2020-06-02 RX ADMIN — PIPERACILLIN AND TAZOBACTAM 4.5 G: 4; .5 INJECTION, POWDER, FOR SOLUTION INTRAVENOUS at 08:49

## 2020-06-02 NOTE — CONSULTS
[facility] Medicine Services      Patient Name: Suzie Duvall  : 1980  MRN: [id number]  Primary Care Physician: Kaylene Schroeder MD  Date of admission: 2020    Patient Care Team:  Kaylene Schroeder MD as PCP - General (Family Medicine)          Subjective   History Present Illness     Chief Complaint:   Chief Complaint   Patient presents with   • Abdominal Pain       Ms. Duvall is a 39 y.o.  female who presented to [facility] ED on 2020 complaining of abdominal pain. Workup in the ER revealed ruptured appendicitis, severe sepsis with shock, and acute UTI. She was given IV fluids and started on empiric antibiotics. General surgery was consulted and she was taken for an emergent exploratory laparotomy with open appendectomy and washout of peritonitis. Postoperatively she was extubated, but had some hypoxia and required vasopressor support for continued hypotension. She was admitted to the ICU for close monitoring and further treatment.       2020: Patient reports feeling much better today.  Continues to have mild abdominal pain which is improving.  No shortness of air reported on room air.  No chest pain.  Continues to require Jose Rafael-Synephrine drip for hypotension.      :  In bed, no new issues.  Feels ok.  Pain is controlled.  Off pressor.     :  In bed, reports nausea and vomiting.  On clear liquid diet.  KUB obtained.  Patient downgraded to SIPS with monitor and Hospitalist team consulted for medical management.            Review of Systems   Gastrointestinal: Positive for abdominal pain, nausea and vomiting.   All other systems reviewed and are negative.        Personal History     Past Medical History:   Past Medical History:   Diagnosis Date   • Obesity (BMI 30-39.9) 2020       Surgical History:      Past Surgical History:   Procedure Laterality Date   • EXPLORATORY LAPAROTOMY N/A 2020    Procedure: LAPAROTOMY  EXPLORATORY;  Surgeon: Scarlet Ruvalcaba MD;  Location: [facility] MAIN OR;  Service: General;  Laterality: N/A;       Family History: Family history is unknown by patient. Otherwise pertinent FHx was reviewed and unremarkable.     Social History:  reports that she has never smoked. She has never used smokeless tobacco. Drug use questions deferred to the physician. She reports that she does not drink alcohol.      Medications:  Prior to Admission medications    Medication Sig Start Date End Date Taking? Authorizing Provider   ciprofloxacin (CIPRO) 500 MG tablet Take 500 mg by mouth 2 (Two) Times a Day. 5/25/20  Yes Marlon Rosario MD   metroNIDAZOLE (FLAGYL) 500 MG tablet Take 500 mg by mouth 3 (Three) Times a Day. 5/25/20  Yes ProviderMarlon MD       Allergies:  No Known Allergies      Objective   Objective     Vital Signs  Temp:  [97.6 °F (36.4 °C)-99.8 °F (37.7 °C)] 99.3 °F (37.4 °C)  Heart Rate:  [101-125] 109  BP: ()/() 104/70  SpO2:  [91 %-100 %] 93 %  on  Flow (L/min):  [1-2] 2;   Device (Oxygen Therapy): room air  Body mass index is 34.44 kg/m².    Physical Exam   Constitutional: She is oriented to person, place, and time. She appears well-developed.   obese   HENT:   Head: Normocephalic and atraumatic.   Mouth/Throat: Oropharynx is clear and moist.   Eyes: Pupils are equal, round, and reactive to light. Conjunctivae and EOM are normal.   Neck: Normal range of motion. Neck supple.   Cardiovascular: Regular rhythm, normal heart sounds and intact distal pulses. Tachycardia present.   Pulmonary/Chest: Effort normal. She has decreased breath sounds in the right lower field and the left lower field.   On room air   Abdominal: She exhibits distension. Bowel sounds are decreased. There is generalized tenderness.   Musculoskeletal: Normal range of motion. She exhibits no edema.   Neurological: She is alert and oriented to person, place, and time.   Skin: Skin is warm and dry. Capillary refill  takes less than 2 seconds.   Abdominal surgical incision covered with dry dressing. LACHELLE drain in place with serous drainage.   Psychiatric: She has a normal mood and affect. Her behavior is normal.   Vitals reviewed.        Results Review:  I have personally reviewed most recent lab results, microbiology results and radiology images and interpretations and agree with findings.    Results from last 7 days   Lab Units 06/02/20  0450  05/30/20  1009   WBC 10*3/mm3 21.10*   < > 3.40   HEMOGLOBIN g/dL 9.6*   < > 11.2*   HEMATOCRIT % 27.7*   < > 34.0   PLATELETS 10*3/mm3 403   < > 391   INR   --   --  1.06    < > = values in this interval not displayed.     Results from last 7 days   Lab Units 06/02/20  0450 06/01/20  1650  05/30/20  1015  05/30/20  1009   SODIUM mmol/L 138  --    < >  --   --  132*   POTASSIUM mmol/L 3.5  --    < >  --   --  3.5   CHLORIDE mmol/L 107  --    < >  --   --  102   CO2 mmol/L 23.0  --    < >  --   --  16.0*   BUN mg/dL 15  --    < >  --   --  15   CREATININE mg/dL 0.44*  --    < >  --   --  1.06*   GLUCOSE mg/dL 125*  --    < >  --   --  202*   CALCIUM mg/dL 8.5*  --    < >  --   --  8.6   ALT (SGPT) U/L 11  --    < >  --   --  7   AST (SGOT) U/L 17  --    < >  --   --  9   TROPONIN T ng/mL  --   --   --   --   --  <0.010   LACTATE mmol/L  --  1.2   < >  --    < >  --    PROCALCITONIN ng/mL  --   --   --  7.41*  --   --     < > = values in this interval not displayed.     Estimated Creatinine Clearance: 187.5 mL/min (A) (by C-G formula based on SCr of 0.44 mg/dL (L)).  Brief Urine Lab Results  (Last result in the past 365 days)      Color   Clarity   Blood   Leuk Est   Nitrite   Protein   CREAT   Urine HCG        05/30/20 1156               Negative           Microbiology Results (last 10 days)     Procedure Component Value - Date/Time    Body Fluid Culture - Body Fluid, Peritoneum [[id number]] Collected:  06/01/20 1731    Lab Status:  Preliminary result Specimen:  Body Fluid from Peritoneum  Updated:  06/02/20 0651     Body Fluid Culture No growth at less than 24 hours     Gram Stain No organisms seen      Few (2+) WBCs seen    Respiratory Panel, PCR - Swab, Nasopharynx [[id number]]  (Normal) Collected:  05/30/20 1836    Lab Status:  Final result Specimen:  Swab from Nasopharynx Updated:  05/30/20 2051     ADENOVIRUS, PCR Not Detected     Coronavirus 229E Not Detected     Coronavirus HKU1 Not Detected     Coronavirus NL63 Not Detected     Coronavirus OC43 Not Detected     Human Metapneumovirus Not Detected     Human Rhinovirus/Enterovirus Not Detected     Influenza B PCR Not Detected     Parainfluenza Virus 1 Not Detected     Parainfluenza Virus 2 Not Detected     Parainfluenza Virus 3 Not Detected     Parainfluenza Virus 4 Not Detected     Bordetella pertussis pcr Not Detected     Influenza A H1 2009 PCR Not Detected     Chlamydophila pneumoniae PCR Not Detected     Mycoplasma pneumo by PCR Not Detected     Influenza A PCR Not Detected     Influenza A H3 Not Detected     Influenza A H1 Not Detected     RSV, PCR Not Detected    Narrative:       The coronavirus on the RVP is NOT COVID-19 and is NOT indicative of infection with COVID-19.     MRSA Screen, PCR (Inpatient) - Swab, Nares [[id number]]  (Normal) Collected:  05/30/20 1835    Lab Status:  Final result Specimen:  Swab from Nares Updated:  05/30/20 2040     MRSA PCR No MRSA Detected    COVID PRE-OP / PRE-PROCEDURE SCREENING ORDER (NO ISOLATION) - Swab, Nasopharynx [[id number]] Collected:  05/30/20 1232    Lab Status:  Final result Specimen:  Swab from Nasopharynx Updated:  05/30/20 1328    Narrative:       The following orders were created for panel order COVID PRE-OP / PRE-PROCEDURE SCREENING ORDER (NO ISOLATION) - Swab, Nasopharynx.  Procedure                               Abnormality         Status                     ---------                               -----------         ------                     COVID-19,CEPHEID,COR/VALERIA...[[id number]]   Normal              Final result                 Please view results for these tests on the individual orders.    COVID-19,CEPHEID,COR/VALERIA/PAD IN-HOUSE(OR EMERGENT/ADD-ON),NP SWAB IN TRANSPORT MEDIA 3-4 HR TAT - Swab, Nasopharynx [[id number]]  (Normal) Collected:  05/30/20 1232    Lab Status:  Final result Specimen:  Swab from Nasopharynx Updated:  05/30/20 1328     COVID19 Not Detected    Blood Culture - Blood, Arm, Right [[id number]] Collected:  05/30/20 1018    Lab Status:  Preliminary result Specimen:  Blood from Arm, Right Updated:  06/02/20 1030     Blood Culture No growth at 3 days    Blood Culture - Blood, Arm, Left [[id number]] Collected:  05/30/20 1009    Lab Status:  Preliminary result Specimen:  Blood from Arm, Left Updated:  06/02/20 1030     Blood Culture No growth at 3 days          ECG/EMG Results (most recent)     Procedure Component Value Units Date/Time    ECG 12 Lead [[id number]] Resulted:  05/30/20 1251     Updated:  05/30/20 1251    ECG 12 Lead [[id number]] Collected:  05/30/20 1002     Updated:  05/31/20 0837    Narrative:       HEART RATE= 109  bpm  RR Interval= 552  ms  DC Interval= 140  ms  P Horizontal Axis= 18  deg  P Front Axis= 39  deg  QRSD Interval= 81  ms  QT Interval= 320  ms  QRS Axis= 28  deg  T Wave Axis= 3  deg  - OTHERWISE NORMAL ECG -  Sinus tachycardia  No previous ECG available for comparison  Electronically Signed By: Alfredo PhillipsValeria) 31-May-2020 08:36:30  Date and Time of Study: 2020-05-30 10:02:19          Ct Abdomen Pelvis With Contrast    Result Date: 5/30/2020   1. There are findings consistent with ruptured acute appendicitis. 2. There are gas bubbles within the intra-abdominal fat. There are several poorly organized partially rim-enhancing fluid collections within the lower pelvis. There is no discrete organized abscess. There is free fluid within the abdomen and pelvis as described above. 3. Slight thickening of the wall of small bowel probably representing reactive  enteritis. 4. Bilateral basilar subsegmental atelectasis. 5. The abnormal results were discussed with GERTRUDIS Reese by telephone. I would recommend surgical consultation.  Electronically Signed By-Nitin Garcia On:5/30/2020 12:10 PM This report was finalized on [id number] by  Nitin Garcia, .    Xr Chest 1 View    Result Date: 5/31/2020   1. Low lung volumes with bilateral basilar subsegmental atelectasis. 2. The patient may have new pulmonary vascular congestion.  Electronically Signed By-Nitin Garcia On:5/31/2020 1:58 PM This report was finalized on [id number] by  Nitin Garcia, .    Xr Chest 1 View    Result Date: 5/30/2020  Low lung volumes with bilateral basilar subsegmental atelectasis.  Electronically Signed By-Nitin Garcia On:5/30/2020 10:41 AM This report was finalized on [id number] by  Nitin Garcia, .    Xr Abdomen Kub    Result Date: 6/2/2020   1. Developing dilated small bowel loops, which may reflect postoperative ileus. Evolving partial small bowel obstruction not excluded. Continued clinical and radiographic follow-up is recommended.  Electronically Signed By-Dr. Denisse Marino MD On:6/2/2020 10:44 AM This report was finalized on [id number] by Dr. Denisse Marino MD.        Estimated Creatinine Clearance: 187.5 mL/min (A) (by C-G formula based on SCr of 0.44 mg/dL (L)).      Assessment/Plan   Assessment/Plan       Active Hospital Problems    Diagnosis  POA   • Acute peritonitis (CMS/HCC) [K65.0]  Yes     Priority: High   • Nausea and vomiting [R11.2]  No     Priority: Medium   • Leukocytosis [D72.829]  No     Priority: Medium   • Acute cystitis without hematuria [N30.00]  Yes     Priority: Medium   • Normocytic anemia [D64.9]  Yes     Priority: Low   • Acute hyperglycemia [R73.9]  Yes     Priority: Low   • Obesity (BMI 30-39.9) [E66.9]  Yes      Resolved Hospital Problems    Diagnosis Date Resolved POA   • **Ruptured appendicitis [K35.32] 05/30/2020 Yes     Priority: High   • S/P appendectomy  [Z90.49] 06/02/2020 Not Applicable     Priority: High   • Severe sepsis with septic shock (CMS/[facility]) [A41.9, R65.21] 06/02/2020 Yes     Priority: High   • Acute kidney injury (CMS/[facility]) [N17.9] 06/02/2020 Yes     Priority: Medium   • Acute respiratory failure with hypoxia (CMS/[facility]) [J96.01] 06/02/2020 Yes     Priority: Medium       Acute peritonitis, Leukocytosis  -secondary to ruptured appendicitis  -status post exploratory laparotomy with open appendectomy and washout of peritonitis (05/30/20)  -septic shock resolved; off pressors  -blood cultures pending  -body fluid culture pending  -continue Zosyn  -general surgery following  -ID following and managing abx  -PRN analgesia per surgery    Nausea and vomiting  -KUB shows possible post-op ileus, cannot rule out partial small bowel obstruction  -on clear liquid diet  -IV Protonix daily  -IV Zofran and NH Phenergan PRN    Acute cystitis without hematuria  -POA, but urine culture not done   -obtain urine culture  -on empiric abx as above    Normocytic anemia  -hgb trended down initially, likely secondary to post-op blood loss, but now up to 9.6  -monitor H&H    Acute hyperglycemia  -likely secondary to acute infection/sepsis  -A1c 5.5%  -accu checks q6h with SSI for tight glycemic control    Obesity (BMI 30-39.9)  -BMI 34.44  -encourage lifestyle modifications         VTE Prophylaxis -   Mechanical Order History:      Ordered        05/30/20 2045  Place Sequential Compression Device  Once         05/30/20 1729  Place Sequential Compression Device  Once         05/30/20 1729  Maintain Sequential Compression Device  Continuous         05/30/20 1230  Place Sequential Compression Device on Patient in Pre-Op  Once                 Pharmalogical Order History:     Ordered     Dose Route Frequency Stop    05/30/20 2045  enoxaparin (LOVENOX) syringe 40 mg      40 mg SC Daily --          CODE STATUS:    Code Status and Medical Interventions:   Ordered at: 05/30/20 2045     Code  Status:    CPR     Medical Interventions (Level of Support Prior to Arrest):    Full         I discussed the patient's findings and my recommendations with patient and nursing staff.        Electronically signed by MORRIS Lorenzo, 06/02/20, 11:01 AM.  [facility] Hospitalist Team      Hospitalist / Attending note  Patient seen and examined, chart reviewed, agree with above. 39 year old female now transferred out of ICU after having complicated hospital stay for rupture appendix.   Agree with plan.  for safe discharge planning.     Mary riley MD.

## 2020-06-02 NOTE — PROGRESS NOTES
Infectious Diseases Progress Note      LOS: 3 days   Patient Care Team:  Kaylene Schroeder MD as PCP - General (Family Medicine)    Chief Complaint: Fatigue    Subjective       The patient has been afebrile for the last 24 hours.  The patient is on room air, hemodynamically stable, and is tolerating antimicrobial therapy.      Review of Systems:   Review of Systems   Constitutional: Positive for fatigue.   HENT: Negative.    Respiratory: Negative.    Gastrointestinal: Positive for abdominal pain.        Improved   Musculoskeletal: Negative.    Skin: Negative.    Neurological: Negative.    Psychiatric/Behavioral: Negative.         Objective     Vital Signs  Temp:  [97.6 °F (36.4 °C)-99.3 °F (37.4 °C)] 99 °F (37.2 °C)  Heart Rate:  [] 89  Resp:  [11-12] 11  BP: (104-148)/() 125/87    Physical Exam:  Physical Exam   Constitutional: She is oriented to person, place, and time. She appears well-developed and well-nourished.   HENT:   Head: Normocephalic and atraumatic.   Eyes: Pupils are equal, round, and reactive to light. Conjunctivae and EOM are normal.   Neck: Neck supple.   Cardiovascular: Normal rate, regular rhythm and normal heart sounds.   Pulmonary/Chest: Effort normal and breath sounds normal.   Abdominal: Soft. Bowel sounds are normal. There is tenderness.   Diminished bowel sounds, LACHELLE drain with serosanguineous fluid   Musculoskeletal: Normal range of motion.   Neurological: She is alert and oriented to person, place, and time.   Skin: Skin is warm and dry.   Psychiatric: She has a normal mood and affect.   Vitals reviewed.       Results Review:    I have reviewed all clinical data, test, lab, and imaging results.     Radiology  Xr Abdomen Kub    Result Date: 6/2/2020  DATE OF EXAM: 6/2/2020 10:35 AM  PROCEDURE: XR ABDOMEN KUB-  INDICATIONS: persistent nausea,recent appendectomy; K35.32-Acute appendicitis with perforation and localized peritonitis, without abscess; R10.9-Unspecified abdominal  pain; A41.9-Sepsis, unspecified organism  COMPARISON: CT abdomen and pelvis 05/30/2020.  TECHNIQUE: Single radiographic view of the abdomen was obtained.   FINDINGS: Dilated stacked small bowel loops with air-fluid levels in the central abdomen compatible with the appearance of either postsurgical ileus or evolving small bowel obstruction. These findings appear new or progressed when compared to the CT abdomen topogram of 05/30/2020. Midline laparotomy staples are present. No gross free intraperitoneal air is seen on the supine examination. A drainage catheter extends transversely over the pelvis with the tip terminating over the region of the left iliac wing.       1. Developing dilated small bowel loops, which may reflect postoperative ileus. Evolving partial small bowel obstruction not excluded. Continued clinical and radiographic follow-up is recommended.  Electronically Signed By-Dr. Denisse Marino MD On:6/2/2020 10:44 AM This report was finalized on [id number] by Dr. Denisse Marino MD.      Cardiology    Laboratory    Results from last 7 days   Lab Units 06/02/20  0450 06/01/20  0600 05/31/20  1336 05/31/20  0325 05/30/20  1009   WBC 10*3/mm3 21.10* 18.00* 14.20* 17.00* 3.40   HEMOGLOBIN g/dL 9.6* 8.4* 8.4* 9.7* 11.2*   HEMATOCRIT % 27.7* 24.3* 25.4* 30.0* 34.0   PLATELETS 10*3/mm3 403 336 313 391 391     Results from last 7 days   Lab Units 06/02/20  1132 06/02/20  0450 06/01/20  0600 05/31/20  1336 05/31/20  0400 05/30/20  1009   SODIUM mmol/L  --  138 140 141 138 132*   POTASSIUM mmol/L 3.6 3.5 3.9 3.4* 3.9 3.5   CHLORIDE mmol/L  --  107 111* 113* 111* 102   CO2 mmol/L  --  23.0 21.0* 18.0* 16.0* 16.0*   BUN mg/dL  --  15 9 9 10 15   CREATININE mg/dL  --  0.44* 0.50* 0.58 0.70 1.06*   GLUCOSE mg/dL  --  125* 124* 177* 158* 202*   ALBUMIN g/dL  --  2.90*  --  2.90*  --  2.90*   BILIRUBIN mg/dL  --  0.4  --  0.4  --  0.9   ALK PHOS U/L  --  94  --  38*  --  44   AST (SGOT) U/L  --  17  --  23  --  9   ALT  (SGPT) U/L  --  11  --  12  --  7   LIPASE U/L  --   --   --   --   --  9*   CALCIUM mg/dL  --  8.5* 8.2* 7.6* 7.1* 8.6                 Microbiology   Microbiology Results (last 10 days)     Procedure Component Value - Date/Time    Body Fluid Culture - Body Fluid, Peritoneum [[id number]] Collected:  06/01/20 1731    Lab Status:  Preliminary result Specimen:  Body Fluid from Peritoneum Updated:  06/02/20 0651     Body Fluid Culture No growth at less than 24 hours     Gram Stain No organisms seen      Few (2+) WBCs seen    Respiratory Panel, PCR - Swab, Nasopharynx [[id number]]  (Normal) Collected:  05/30/20 1836    Lab Status:  Final result Specimen:  Swab from Nasopharynx Updated:  05/30/20 2051     ADENOVIRUS, PCR Not Detected     Coronavirus 229E Not Detected     Coronavirus HKU1 Not Detected     Coronavirus NL63 Not Detected     Coronavirus OC43 Not Detected     Human Metapneumovirus Not Detected     Human Rhinovirus/Enterovirus Not Detected     Influenza B PCR Not Detected     Parainfluenza Virus 1 Not Detected     Parainfluenza Virus 2 Not Detected     Parainfluenza Virus 3 Not Detected     Parainfluenza Virus 4 Not Detected     Bordetella pertussis pcr Not Detected     Influenza A H1 2009 PCR Not Detected     Chlamydophila pneumoniae PCR Not Detected     Mycoplasma pneumo by PCR Not Detected     Influenza A PCR Not Detected     Influenza A H3 Not Detected     Influenza A H1 Not Detected     RSV, PCR Not Detected    Narrative:       The coronavirus on the RVP is NOT COVID-19 and is NOT indicative of infection with COVID-19.     MRSA Screen, PCR (Inpatient) - Swab, Nares [[id number]]  (Normal) Collected:  05/30/20 1835    Lab Status:  Final result Specimen:  Swab from Nares Updated:  05/30/20 2040     MRSA PCR No MRSA Detected    COVID PRE-OP / PRE-PROCEDURE SCREENING ORDER (NO ISOLATION) - Swab, Nasopharynx [[id number]] Collected:  05/30/20 1232    Lab Status:  Final result Specimen:  Swab from Nasopharynx Updated:   05/30/20 1328    Narrative:       The following orders were created for panel order COVID PRE-OP / PRE-PROCEDURE SCREENING ORDER (NO ISOLATION) - Swab, Nasopharynx.  Procedure                               Abnormality         Status                     ---------                               -----------         ------                     COVID-19,CEPHEID,COR/VALERIA...[[id number]]  Normal              Final result                 Please view results for these tests on the individual orders.    COVID-19,CEPHEID,COR/VALERIA/PAD IN-HOUSE(OR EMERGENT/ADD-ON),NP SWAB IN TRANSPORT MEDIA 3-4 HR TAT - Swab, Nasopharynx [[id number]]  (Normal) Collected:  05/30/20 1232    Lab Status:  Final result Specimen:  Swab from Nasopharynx Updated:  05/30/20 1328     COVID19 Not Detected    Blood Culture - Blood, Arm, Right [[id number]] Collected:  05/30/20 1018    Lab Status:  Preliminary result Specimen:  Blood from Arm, Right Updated:  06/02/20 1030     Blood Culture No growth at 3 days    Blood Culture - Blood, Arm, Left [[id number]] Collected:  05/30/20 1009    Lab Status:  Preliminary result Specimen:  Blood from Arm, Left Updated:  06/02/20 1030     Blood Culture No growth at 3 days          Medication Review:       Schedule Meds    enoxaparin 40 mg Subcutaneous Daily   insulin lispro 0-7 Units Subcutaneous 4x Daily With Meals & Nightly   pantoprazole 40 mg Intravenous Q AM   piperacillin-tazobactam 4.5 g Intravenous Q8H   sodium chloride 10 mL Intravenous Q12H       Infusion Meds       PRN Meds  •  acetaminophen  •  dextrose  •  dextrose  •  glucagon (human recombinant)  •  HYDROcodone-acetaminophen  •  insulin lispro **AND** insulin lispro  •  ipratropium-albuterol  •  Morphine  •  ondansetron **OR** ondansetron  •  potassium chloride  •  promethazine  •  sodium chloride        Assessment/Plan       Antimicrobial Therapy   1.  IV Zosyn     day  2.      Day  3.      Day  4.      Day  5.      Day      Assessment     Ruptured  appendicitis  -LACHELLE drain cultures negative so far     Peritonitis secondary to above     Mild septic shock resolved currently off pressors     Plan     Continue IV Zosyn 4.5 g IV every 8 hours-patient will need 2 weeks of IV antibiotics  Supportive care  Labs in [place].    Beth Booker, APRN  06/02/20  17:25

## 2020-06-02 NOTE — PLAN OF CARE
Problem: Patient Care Overview  Goal: Plan of Care Review  Outcome: Ongoing (interventions implemented as appropriate)  Note:   Pt stable overnight; nausea and vomiting most of the night; meds ordered; will continue to monitor.

## 2020-06-02 NOTE — CONSULTS
"Nutrition Services    Patient Name:  Suzie Duvall  YOB: 1980  MRN: [id number]  Admit Date:  5/30/2020    Comments: NPO/Clear Liquids x3 days. Ordering Boost Breeze to optimize intakes while on clear liquids (provides 750 kcal & 27 gms of protein if consumed)    *This assessment completed remotely with assistance from nursing communication and EMR documentation    Reason for Assessment                Reason for Assessment    Reason For Assessment NPO/Clear Liquids x3 days (6/2/2020)     Diagnosis H&P: No PMH    Current Problems/Procedures:    Septic shock, intra-abdominal source with ruptured appendix & peritonitis as well as UTI  -off pressor    Ruptured appendicitis  -sp ex lap with open appendectomy & washout of peritonitis on 5/30    LESLIE  -resolved     Acute hypoxic respiratory failure postoperatively  -on 2 Liters nasal cannula    Hyperglycemia, no known hx of DM, possibly reactive    Acute anemia    UTI  -antibx    N/V         Nutrition/Diet History                Nutrition/Diet History    Narrative     Called pt via phone to discuss appetite/intakes, no answer     Functional Status Independent with ADLs       Food Allergies NKFA     Factors Affecting Nutritional Intake N/V, s/p appendectomy         Anthropometrics             Anthropometrics    Height 162.6 cm (64\")    Weight 91 kg (200 lb)    6/02/20 - noted increase in wt, pt is positive 5.8 Liters since admission       Admit Weight    Admit Weight 80.3 kg (177 lb)    5/30/20       Ideal Body Weight (IBW)    Ideal Body Weight (IBW) 120 lb    % Ideal Body Weight 167%       Usual Body Weight (UBW)    Usual Body Weight UTD    % Usual Body Weight UTD    Weight Hx  No wt hx       Body Mass Index (BMI)    BMI (kg/m2) 34.4           Labs/Medications                Labs    Pertinent Lab Results Glucose 125 H; Crt 0.44 L; Ca 8.5 L; Hgb 9.6 L; Hct 27.7 L        Medications    Pertinent Medications  Humalog; protonix; zosyn; zofran; KCL; phenergan     "     Physical Findings                Physical Findings    Overall Physical Appearance Unable to observe r/t limiting face-to-face contact in the context of the COVID19 pandemic     Edema  1+ edema in BL ankles/feet     Gastrointestinal N/V  Last BM noted as PTA (at least >3 days ago)     Tubes N/A     Oral/Mouth Cavity No reported issues at baseline     Skin Surgical incision on lower abdomen         Estimated/Assessed Needs              Calorie Requirements     Height Used for Calorie Calculations       Weight Used for Calorie Calculations      Formula Chosen for Calorie Calculations       Estimated Calorie Requirements (kcals/day)              Protein Requirements    Weight Used For Protein Calculations       Est Protein Requirement Amount (gms/kg)       Estimated Protein Requirements (gms/day)          Fluid Requirements     Estimated Fluid Requirements (mL/day)            Fluid Deficit       Current Sodium Level (mEq/L)      Desired Sodium Level (mEq/L)      Estimated Fluid Deficit Needs (L)           Nutrition Prescription Ordered                Nutrition Prescription PO    Current PO Diet  Clear Liquids     Supplement -        Nutrition Prescription EN    Enteral Route -     TF Modular -     TF Delivery Method -     Current Ordered TF  -     Current Ordered Water flush  -     TF Observation  -        Nutrition Prescription TPN    TPN Route -     Current Ordered TPN Volume  -     Dextrose (gm/kcals)  -     Amino Acid (gm/kcals) -     Lipids (mL/Concentration/Frequency)  -     TPN Observation  -          Evaluation of Received Nutrient/Fluid Intake                PO Evaluation    % PO Intake 25%-50% of meals documented while on clear liquids        EN Evaluation    TF Changes -     TF Residual -     TF Tolerance -     Average EN Delivered  -        TPN Evaluation    Total Number of Days on TPN  -           Clinical Course      Clinical Nutrition Course Details  Clear Liquids 5/30  NPO 5/31  Clear Liquids 5/31 to  present (6/2)  Total of 3 days NPO or Clear Liquid diet only.         Problem/Interventions:                     Nutrition Diagnoses Problem 1      Problem 1   Inadequate oral intake r/t decreased ability to consume sufficient energy AEB NPO/Clear Liquid diet x3 days     Nutrition Diagnoses Problem 2      Problem 2            Intervention Goal                Intervention Goal    General Diet advancement & po tolerance    Drink at least 2 Boost Breeze daily while on clears         Nutrition Intervention                Nutrition Intervention    RD/Tech Action Ordering boost breeze TID while on clear liquids         Nutrition Prescription                Nutrition Prescription PO      Diet  Clear Liquids     Supplement Boost Breeze TID        Nutrition Prescription EN    Enteral Prescription -        Nutrition Prescription TPN    TPN Prescription -         Monitor/Evaluation                Monitor/Evaluation    Monitor Diet advancement & po intakes/tolerance, BMs, N/V, abd pain/distention, labs (electrolytes, BUN/Crt, glucose), wt for changes, skin integrity, at next encounter           Electronically signed by:  Mariah Brumfield RD  06/02/20 10:09

## 2020-06-02 NOTE — DISCHARGE PLACEMENT REQUEST
"Jadiel Elpidio [place] (39 y.o. Female)     Date of Birth Social Security Number Address Home Phone MRN    1980  [address] [facility] [place] [facility] [phone] [id number]    Scientologist Marital Status          None        Admission Date Admission Type Admitting Provider Attending Provider Department, Room/Bed    5/30/20 Emergency Truong Martínez MD Jaisinghani, Salgram, MD [facility] INTENSIVE CARE UNIT, 2305/1    Discharge Date Discharge Disposition Discharge Destination                       Attending Provider:  Mary Rawls MD    Allergies:  No Known Allergies    Isolation:  None   Infection:  None   Code Status:  CPR    Ht:  162.6 cm (64\")   Wt:  91 kg (200 lb 9.9 oz)    Admission Cmt:  None   Principal Problem:  Ruptured appendicitis [K35.32]                 Active Insurance as of 5/30/2020     Primary Coverage     Payor Plan Insurance Group Employer/Plan Group    ANTHEM MEDICARE REPLACEMENT ANTHEM MEDICARE ADVANTAGE [id number]     Payor Plan Address Payor Plan Phone Number Payor Plan Fax Number Effective Dates    [address] [phone]  1/1/2019 - None Entered    [facility] [address]       Subscriber Name Subscriber Birth Date Member ID       ELPIDIO ZAMORA 1980 [id number]                 Emergency Contacts      (Rel.) Home Phone Work Phone Mobile Phone    ALANNAH BEAULIEU (Spouse) -- -- [phone]              "

## 2020-06-02 NOTE — PROGRESS NOTES
"General Surgery Progress Note     LOS: 3 days   Patient Care Team:  Kaylene Schroeder MD as PCP - General (Family Medicine)    Subjective     Chief Complaint   Patient presents with   • Abdominal Pain       Interval History:   Patient passing a little gas but has some nausea also    Objective     Vital Signs  Temp:  [97.6 °F (36.4 °C)-99.8 °F (37.7 °C)] 99.3 °F (37.4 °C)  Heart Rate:  [101-125] 109  BP: ()/() 104/70    Physical Exam     Incision clean and dry.  Abdomen appropriately tender.  LACHELLE serous    Review of Systems     Results Review:    Lab Results (last 24 hours)     Procedure Component Value Units Date/Time    Tissue Pathology Exam [[id number]] Collected:  05/30/20 1623    Specimen:  Tissue from Large Intestine, Appendix Updated:  06/02/20 1104     Case Report --     Surgical Pathology Report                         Case: [id number]                                  Authorizing Provider:  Scarlet Ruvalcaba MD       Collected:           05/30/2020 04:23 PM          Ordering Location:     [facility] MAIN  Received:            06/01/2020 08:44 AM                                 OR                                                                           Pathologist:           Geremias Quezada MD                                                            Specimen:    Large Intestine, Appendix                                                                   Final Diagnosis --     Appendix, appendectomy:    Mostly necrotic appendix with acute suppurative appendicitis and periappendicitis    PRINCE/tkd        Gross Description --     Received in formalin designated \"Appendix\" is a fragmented appendectomy specimen and associated yellow fatty tissue which measures 7.5 x 2.4 x 2.2 cm. The serosa is grayish brown and purulent appearing.. Sectioning reveals a dilated and partially necrotic wall filled with fecal material. No polyps or masses are identified. Representative sections of proximal, " middle, and distal tip are submitted in one cassette.     [facility]          Blood Culture - Blood, Arm, Right [[id number]] Collected:  05/30/20 1018    Specimen:  Blood from Arm, Right Updated:  06/02/20 1030     Blood Culture No growth at 3 days    Blood Culture - Blood, Arm, Left [[id number]] Collected:  05/30/20 1009    Specimen:  Blood from Arm, Left Updated:  06/02/20 1030     Blood Culture No growth at 3 days    Body Fluid Culture - Body Fluid, Peritoneum [[id number]] Collected:  06/01/20 1731    Specimen:  Body Fluid from Peritoneum Updated:  06/02/20 0651     Body Fluid Culture No growth at less than 24 hours     Gram Stain No organisms seen      Few (2+) WBCs seen    Manual Differential [[id number]]  (Abnormal) Collected:  06/02/20 0450    Specimen:  Blood Updated:  06/02/20 0628     Neutrophil % 69.0 %      Lymphocyte % 6.0 %      Monocyte % 5.0 %      Bands %  19.0 %      Metamyelocyte % 1.0 %      Neutrophils Absolute 18.57 10*3/mm3      Lymphocytes Absolute 1.27 10*3/mm3      Monocytes Absolute 1.06 10*3/mm3      RBC Morphology Normal     Toxic Granulation Slight/1+     Vacuolated Neutrophils Slight/1+     Platelet Morphology Normal    CBC & Differential [[id number]] Collected:  06/02/20 0450    Specimen:  Blood Updated:  06/02/20 0628    Narrative:       The following orders were created for panel order CBC & Differential.  Procedure                               Abnormality         Status                     ---------                               -----------         ------                     CBC Auto Differential[[id number]]        Abnormal            Final result                 Please view results for these tests on the individual orders.    CBC Auto Differential [[id number]]  (Abnormal) Collected:  06/02/20 0450    Specimen:  Blood Updated:  06/02/20 0628     WBC 21.10 10*3/mm3      RBC 3.18 10*6/mm3      Hemoglobin 9.6 g/dL      Hematocrit 27.7 %      MCV 87.3 fL      MCH 30.1 pg      MCHC 34.5 g/dL       RDW 14.0 %      RDW-SD 43.3 fl      MPV 7.6 fL      Platelets 403 10*3/mm3     Scan Slide [[id number]] Collected:  06/02/20 0450    Specimen:  Blood Updated:  06/02/20 0628     Scan Slide --     Comment: See Manual Differential Results       Comprehensive Metabolic Panel [[id number]]  (Abnormal) Collected:  06/02/20 0450    Specimen:  Blood Updated:  06/02/20 0525     Glucose 125 mg/dL      BUN 15 mg/dL      Creatinine 0.44 mg/dL      Sodium 138 mmol/L      Potassium 3.5 mmol/L      Chloride 107 mmol/L      CO2 23.0 mmol/L      Calcium 8.5 mg/dL      Total Protein 6.6 g/dL      Albumin 2.90 g/dL      ALT (SGPT) 11 U/L      AST (SGOT) 17 U/L      Alkaline Phosphatase 94 U/L      Total Bilirubin 0.4 mg/dL      eGFR Non African Amer >150 mL/min/1.73      Globulin 3.7 gm/dL      A/G Ratio 0.8 g/dL      BUN/Creatinine Ratio 34.1     Anion Gap 8.0 mmol/L     Narrative:       GFR Normal >60  Chronic Kidney Disease <60  Kidney Failure <15      Magnesium [[id number]]  (Normal) Collected:  06/02/20 0450    Specimen:  Blood Updated:  06/02/20 0522     Magnesium 2.1 mg/dL     POC Glucose Once [[id number]]  (Abnormal) Collected:  06/02/20 0250    Specimen:  Blood Updated:  06/02/20 0251     Glucose 116 mg/dL      Comment: Serial Number: [id number]:  [id number]       POC Glucose Once [[id number]]  (Normal) Collected:  06/01/20 1948    Specimen:  Blood Updated:  06/01/20 1955     Glucose 105 mg/dL      Comment: Serial Number: [id number]:  [id number]       POC Glucose Once [[id number]]  (Abnormal) Collected:  06/01/20 1754    Specimen:  Blood Updated:  06/01/20 1757     Glucose 109 mg/dL      Comment: Serial Number: [id number]:  [id number]       Lactic Acid, Plasma [[id number]]  (Normal) Collected:  06/01/20 1650    Specimen:  Blood Updated:  06/01/20 1712     Lactate 1.2 mmol/L     Blood Gas, Arterial [[id number]]  (Abnormal) Collected:  05/31/20 1421    Specimen:  Arterial Blood Updated:  06/01/20 1448     Site Right  Radial     Rashid's Test Positive     pH, Arterial 7.362 pH units      pCO2, Arterial 32.6 mm Hg      pO2, Arterial 80.4 mm Hg      HCO3, Arterial 18.5 mmol/L      Base Excess, Arterial -6.3 mmol/L      Comment: Serial Number: [id number]:  [id number]        O2 Saturation, Arterial 95.5 %      CO2 Content 19.5 mmol/L      Barometric Pressure for Blood Gas --     Comment: N/A        Modality Cannula     FIO2 <21 %      Hemodilution No    Blood Gas, Arterial [[id number]]  (Abnormal) Collected:  05/31/20 0510    Specimen:  Arterial Blood Updated:  06/01/20 1438     Site Left Radial     Rashid's Test Positive     pH, Arterial 7.299 pH units      pCO2, Arterial 33.2 mm Hg      pO2, Arterial 97.7 mm Hg      HCO3, Arterial 16.3 mmol/L      Base Excess, Arterial -9.2 mmol/L      Comment: Serial Number: [id number]:  [id number]        O2 Saturation, Arterial 96.9 %      CO2 Content 17.3 mmol/L      Barometric Pressure for Blood Gas --     Comment: N/A        Modality Cannula     FIO2 <21 %      Hemodilution No    POC Glucose Once [[id number]]  (Abnormal) Collected:  06/01/20 1235    Specimen:  Blood Updated:  06/01/20 1237     Glucose 113 mg/dL      Comment: Serial Number: [id number]:  [id number]       Blood Gas, Arterial [[id number]]  (Abnormal) Collected:  05/30/20 1012    Specimen:  Arterial Blood Updated:  06/01/20 1223     Site Left Brachial     Rashid's Test N/A     pH, Arterial 7.395 pH units      pCO2, Arterial 26.9 mm Hg      pO2, Arterial 86.5 mm Hg      HCO3, Arterial 16.5 mmol/L      Base Excess, Arterial -7.1 mmol/L      Comment: Serial Number: [id number]:  [id number]        O2 Saturation, Arterial 96.8 %      CO2 Content 17.3 mmol/L      Barometric Pressure for Blood Gas --     Comment: N/A        Modality Room Air     FIO2 21 %      Hemodilution No        Imaging Results (Last 24 Hours)     Procedure Component Value Units Date/Time    XR Abdomen KUB [[id number]] Collected:  06/02/20 1042     Updated:  06/02/20 1047     Narrative:       DATE OF EXAM:  6/2/2020 10:35 AM     PROCEDURE:  XR ABDOMEN KUB-     INDICATIONS:  persistent nausea,recent appendectomy; K35.32-Acute appendicitis with  perforation and localized peritonitis, without abscess;  R10.9-Unspecified abdominal pain; A41.9-Sepsis, unspecified organism     COMPARISON:  CT abdomen and pelvis 05/30/2020.     TECHNIQUE:   Single radiographic view of the abdomen was obtained.        FINDINGS:  Dilated stacked small bowel loops with air-fluid levels in the central  abdomen compatible with the appearance of either postsurgical ileus or  evolving small bowel obstruction. These findings appear new or  progressed when compared to the CT abdomen topogram of 05/30/2020.  Midline laparotomy staples are present. No gross free intraperitoneal  air is seen on the supine examination. A drainage catheter extends  transversely over the pelvis with the tip terminating over the region of  the left iliac wing.        Impression:          1. Developing dilated small bowel loops, which may reflect postoperative  ileus. Evolving partial small bowel obstruction not excluded. Continued  clinical and radiographic follow-up is recommended.     Electronically Signed By-Dr. Denisse Marino MD On:6/2/2020 10:44 AM  This report was finalized on [id number] by Dr. Denisse Marino MD.          I reviewed the patient's new clinical results.    Medication Review:    Current Facility-Administered Medications:   •  acetaminophen (TYLENOL) tablet 650 mg, 650 mg, Oral, Q6H PRN, Joshua Suh APRN, 650 mg at 05/30/20 2111  •  dextrose (D50W) 25 g/ 50mL Intravenous Solution 25 g, 25 g, Intravenous, Q15 Min PRN, Day, Marilin, APRN  •  dextrose (GLUTOSE) oral gel 15 g, 15 g, Oral, Q15 Min PRN, Day, Marilin, APRN  •  enoxaparin (LOVENOX) syringe 40 mg, 40 mg, Subcutaneous, Daily, Emilia Mathur PA-C, 40 mg at 06/01/20 1633  •  glucagon (human recombinant) (GLUCAGEN DIAGNOSTIC) injection 1 mg, 1 mg, Subcutaneous, Q15  Min PRN, Day, Dawn, APRN  •  HYDROcodone-acetaminophen (NORCO) 5-325 MG per tablet 1 tablet, 1 tablet, Oral, Q6H PRN, Day, Dawn, APRN, 1 tablet at 06/01/20 1309  •  insulin lispro (humaLOG) injection 0-7 Units, 0-7 Units, Subcutaneous, 4x Daily With Meals & Nightly **AND** insulin lispro (humaLOG) injection 0-7 Units, 0-7 Units, Subcutaneous, PRN, Joshua Suh APRN  •  ipratropium-albuterol (DUO-NEB) nebulizer solution 3 mL, 3 mL, Nebulization, Q2H PRN, Day, Marilin, APRN  •  Morphine sulfate (PF) injection 2 mg, 2 mg, Intravenous, Q4H PRN, Joshua Suh APRN, 2 mg at 06/01/20 0204  •  ondansetron (ZOFRAN) tablet 4 mg, 4 mg, Oral, Q6H PRN **OR** ondansetron (ZOFRAN) injection 4 mg, 4 mg, Intravenous, Q6H PRN, Day, Dawn, APRN, 4 mg at 06/02/20 0849  •  pantoprazole (PROTONIX) injection 40 mg, 40 mg, Intravenous, Q AM, Day, Marilin, APRN, 40 mg at 06/02/20 0537  •  piperacillin-tazobactam (ZOSYN) IVPB 4.5 g in 100 mL NS (CD), 4.5 g, Intravenous, Q8H, Truong Martínez MD, 4.5 g at 06/02/20 0849  •  potassium chloride 10 mEq in 100 mL IVPB, 10 mEq, Intravenous, Q1H PRN, Day, Marilin, APRN, Last Rate: 100 mL/hr at 06/02/20 0952, 10 mEq at 06/02/20 0952  •  promethazine (PHENERGAN) suppository 12.5 mg, 12.5 mg, Rectal, Q6H PRN, Joshua Suh APRN, 12.5 mg at 06/02/20 0437  •  sodium chloride 0.9 % flush 10 mL, 10 mL, Intravenous, Q12H, Day, Marilin, APRN, 10 mL at 06/02/20 0850  •  sodium chloride 0.9 % flush 10 mL, 10 mL, Intravenous, PRN, Day, Marilin, APRN    Assessment/Plan     Active Problems:    Normocytic anemia    Acute hyperglycemia    Acute cystitis without hematuria    Nausea and vomiting    Leukocytosis    Obesity (BMI 30-39.9)    Acute peritonitis (CMS/HCC)      That is post open appendectomy and washout for severe perforated gangrenous appendicitis with separative peritonitis- patient's clinical picture is exactly as expected.  I expected postop ileus.  Concur with getting ID involved.  Okay to transfer to  [facility] monitored from a surgical standpoint.    Plan for disposition: To martin Ruvalcaba MD  06/02/20  11:12

## 2020-06-02 NOTE — PROGRESS NOTES
KPA/PULM/CC PROGRESS NOTE     [place]  The following information was obtained primarily from review of documentation and discussion with Dr. Ruvalcaba as the patient was postanesthesia, drowsy and confused at the time of initial examination in PACU.  Pt reported to the ED provider that she had experienced 5 days of progressive worsening of sharp, bilateral lower quadrant abdominal pain.  The patient was evaluated at [facility] emergency department earlier this week during which time a CT was performed that was reportedly negative with the exception of ovarian cyst and the patient was discharged home with Cipro and Flagyl.  The patient reportedly started having much worse abdominal pain last night around 8 PM and around 9 PM started nausea, vomiting, and diarrhea.  She was evaluated at [facility] emergency department earlier this morning for her complaints of following evaluation was discharged home on and instructed to continue antibiotics.  No diagnostics were performed at that time.  The patient felt worse throughout the day and presented to [facility] emergency department for evaluation.  CT scan of the abdomen revealed findings consistent with acute ruptured appendicitis.  Blood pressure was noted to be 70/30 and the patient received 30 mL/kg IV fluid resuscitation.  She also received empiric antibiotics with Zosyn after blood cultures and urine culture were obtained.  The patient was taken emergently to the OR where she had exploratory lap with open appendectomy and washout of peritonitis with free-flowing purulent and feculent matter contamination noted.  Postoperatively the patient extubated easily.  Since admission she has received 7 L IV fluid resuscitation with normal saline as well as 500 mL of colloid.  Postoperatively she continued to be hypotensive and is currently on a kurtis-drip.     In the emergency department the patient reported that she had also had some dysuria and has been  taking Pyridium at home.  She denied hematemesis, hematochezia, melena.  She reported some mild chest pain and shortness of air.  She also reported that she had lightheadedness with subjective fever and chills.     SUBJECTIVE    5/31: Patient reports feeling much better today.  Continues to have mild abdominal pain which is improving.  No shortness of air reported on room air.  No chest pain.  Continues to require Jose Rafael-Synephrine drip for hypotension    6/1:  In bed, no new issues.  Feels ok.  Pain is controlled.  Remains off pressor  6/2:  In bed, reports nausea and vomiting this AM.      OBJECTIVE    Vitals:    06/02/20 0521 06/02/20 0551 06/02/20 0600 06/02/20 0621   BP: 132/90 124/76  104/70   Pulse: 101 103  109   Resp:       Temp:       TempSrc:       SpO2: 95% 92%  93%   Weight:   91 kg (200 lb 9.9 oz)    Height:          Intake/Output last 3 shifts:  I/O last 3 completed shifts:  In: 2238.6 [P.O.:960; I.V.:1278.6]  Out: 1530 [Urine:655; Emesis/NG output:650; Drains:225]  Intake/Output this shift:  No intake/output data recorded.    General Appearance:  Alert, cooperative, no distress, appears stated age  Head:  Normocephalic, without obvious abnormality, atraumatic  Eyes:  PERRL, conjunctivae/corneas clear, EOM's intact     Neck:  Supple,  no JVD     Lungs:   Clear to auscultation bilaterally, respirations unlabored  Chest wall:  No tenderness, symmetrical  Heart: Sinus tachycardia, S1 and S2 normal, no murmur, rub   or gallop  Abdomen:  Soft, mild postsurgical discomfort, bowel sounds active all four quadrants, obese.  Dressings and LACHELLE drain  Extremities:  Extremities normal, no cyanosis or edema  Skin:  No rashes or lesions.  Abdomen with postsurgical dressings in place  Neurologic:   Alert and oriented, no focal deficits    Scheduled Meds:    enoxaparin 40 mg Subcutaneous Daily   insulin lispro 0-7 Units Subcutaneous 4x Daily With Meals & Nightly   pantoprazole 40 mg Intravenous Q AM    piperacillin-tazobactam 4.5 g Intravenous Q8H   sodium chloride 10 mL Intravenous Q12H       Continuous Infusions:       PRN Meds:•  acetaminophen  •  dextrose  •  dextrose  •  glucagon (human recombinant)  •  HYDROcodone-acetaminophen  •  insulin lispro **AND** insulin lispro  •  ipratropium-albuterol  •  Morphine  •  ondansetron **OR** ondansetron  •  potassium chloride  •  promethazine  •  sodium chloride     LABS    CBC  Results from last 7 days   Lab Units 06/02/20  0450 06/01/20  0600 05/31/20  1336 05/31/20  0325 05/30/20  1009   WBC 10*3/mm3 21.10* 18.00* 14.20* 17.00* 3.40   RBC 10*6/mm3 3.18* 2.78* 2.87* 3.29* 3.79   HEMOGLOBIN g/dL 9.6* 8.4* 8.4* 9.7* 11.2*   HEMATOCRIT % 27.7* 24.3* 25.4* 30.0* 34.0   MCV fL 87.3 87.3 88.6 91.1 89.8   PLATELETS 10*3/mm3 403 336 313 391 391       CMP   Results from last 7 days   Lab Units 06/02/20  0450 06/01/20  0600 05/31/20  1336 05/31/20  0400 05/30/20  1009   SODIUM mmol/L 138 140 141 138 132*   POTASSIUM mmol/L 3.5 3.9 3.4* 3.9 3.5   CHLORIDE mmol/L 107 111* 113* 111* 102   CO2 mmol/L 23.0 21.0* 18.0* 16.0* 16.0*   BUN mg/dL 15 9 9 10 15   CREATININE mg/dL 0.44* 0.50* 0.58 0.70 1.06*   GLUCOSE mg/dL 125* 124* 177* 158* 202*   ALBUMIN g/dL 2.90*  --  2.90*  --  2.90*   BILIRUBIN mg/dL 0.4  --  0.4  --  0.9   ALK PHOS U/L 94  --  38*  --  44   AST (SGOT) U/L 17  --  23  --  9   ALT (SGPT) U/L 11  --  12  --  7   LIPASE U/L  --   --   --   --  9*       TROPONIN  Results from last 7 days   Lab Units 05/30/20  1009   TROPONIN T ng/mL <0.010       CoAg  Results from last 7 days   Lab Units 05/30/20  1009   INR  1.06   APTT seconds 24.4       ABG  Results from last 7 days   Lab Units 05/31/20  1421 05/31/20  0510 05/30/20  1012   PH, ARTERIAL pH units 7.362 7.299* 7.395   PCO2, ARTERIAL mm Hg 32.6* 33.2* 26.9*   PO2 ART mm Hg 80.4* 97.7 86.5   O2 SATURATION ART % 95.5 96.9 96.8   BASE EXCESS ART mmol/L -6.3* -9.2* -7.1*       Microbiology  Microbiology Results (last 10  days)     Procedure Component Value - Date/Time    Body Fluid Culture - Body Fluid, Peritoneum [[id number]] Collected:  06/01/20 1731    Lab Status:  Preliminary result Specimen:  Body Fluid from Peritoneum Updated:  06/02/20 0651     Body Fluid Culture No growth at less than 24 hours     Gram Stain No organisms seen      Few (2+) WBCs seen    Respiratory Panel, PCR - Swab, Nasopharynx [[id number]]  (Normal) Collected:  05/30/20 1836    Lab Status:  Final result Specimen:  Swab from Nasopharynx Updated:  05/30/20 2051     ADENOVIRUS, PCR Not Detected     Coronavirus 229E Not Detected     Coronavirus HKU1 Not Detected     Coronavirus NL63 Not Detected     Coronavirus OC43 Not Detected     Human Metapneumovirus Not Detected     Human Rhinovirus/Enterovirus Not Detected     Influenza B PCR Not Detected     Parainfluenza Virus 1 Not Detected     Parainfluenza Virus 2 Not Detected     Parainfluenza Virus 3 Not Detected     Parainfluenza Virus 4 Not Detected     Bordetella pertussis pcr Not Detected     Influenza A H1 2009 PCR Not Detected     Chlamydophila pneumoniae PCR Not Detected     Mycoplasma pneumo by PCR Not Detected     Influenza A PCR Not Detected     Influenza A H3 Not Detected     Influenza A H1 Not Detected     RSV, PCR Not Detected    Narrative:       The coronavirus on the RVP is NOT COVID-19 and is NOT indicative of infection with COVID-19.     MRSA Screen, PCR (Inpatient) - Swab, Nares [[id number]]  (Normal) Collected:  05/30/20 1835    Lab Status:  Final result Specimen:  Swab from Nares Updated:  05/30/20 2040     MRSA PCR No MRSA Detected    COVID PRE-OP / PRE-PROCEDURE SCREENING ORDER (NO ISOLATION) - Swab, Nasopharynx [[id number]] Collected:  05/30/20 1232    Lab Status:  Final result Specimen:  Swab from Nasopharynx Updated:  05/30/20 1328    Narrative:       The following orders were created for panel order COVID PRE-OP / PRE-PROCEDURE SCREENING ORDER (NO ISOLATION) - Swab, Nasopharynx.  Procedure                                Abnormality         Status                     ---------                               -----------         ------                     COVID-19,CEPHEID,COR/VALERIA...[[id number]]  Normal              Final result                 Please view results for these tests on the individual orders.    COVID-19,CEPHEID,COR/VALERIA/PAD IN-HOUSE(OR EMERGENT/ADD-ON),NP SWAB IN TRANSPORT MEDIA 3-4 HR TAT - Swab, Nasopharynx [[id number]]  (Normal) Collected:  05/30/20 1232    Lab Status:  Final result Specimen:  Swab from Nasopharynx Updated:  05/30/20 1328     COVID19 Not Detected    Blood Culture - Blood, Arm, Right [[id number]] Collected:  05/30/20 1018    Lab Status:  Preliminary result Specimen:  Blood from Arm, Right Updated:  06/01/20 1030     Blood Culture No growth at 2 days    Blood Culture - Blood, Arm, Left [[id number]] Collected:  05/30/20 1009    Lab Status:  Preliminary result Specimen:  Blood from Arm, Left Updated:  06/01/20 1030     Blood Culture No growth at 2 days          IMAGING & OTHER STUDIES    Imaging Results (Last 72 Hours)     Procedure Component Value Units Date/Time    XR Chest 1 View [[id number]] Collected:  05/31/20 1356     Updated:  05/31/20 1400    Narrative:       DATE OF EXAM:  5/31/2020 1:29 PM     PROCEDURE:  XR CHEST 1 VW-     INDICATIONS:  Tachypnea, perforated appendix, status post appendectomy, hypoxia.      COMPARISON:  05/30/2020.     TECHNIQUE:   Single radiographic view of the chest was obtained.     FINDINGS:  The lung volumes are diminished. There is bilateral basilar densities  felt to represent atelectasis similar to yesterday's exam. The patient  may have new mild pulmonary vascular congestion. There are no definite  pleural effusions.  There has been interval placement of a right upper  extremity PICC line with the tip terminating in the right atrium.       Impression:          1. Low lung volumes with bilateral basilar subsegmental atelectasis.  2. The patient may  have new pulmonary vascular congestion.     Electronically Signed By-Nitin Garcia On:5/31/2020 1:58 PM  This report was finalized on [id number] by  Nitin Garcia, .    CT Abdomen Pelvis With Contrast [[id number]] Collected:  05/30/20 1204     Updated:  05/30/20 1212    Narrative:       DATE OF EXAM:  5/30/2020 11:49 AM     PROCEDURE:  CT ABDOMEN PELVIS W CONTRAST-     INDICATIONS:   Diffuse abdominal pain with nausea.     COMPARISON:   10/19/2015.     TECHNIQUE:  Routine transaxial slices were obtained through the abdomen and pelvis  after the intravenous administration of 100 mL of Isovue 370.  Reconstructed coronal and sagittal images were also obtained. Automated  exposure control and iterative construction methods were used.     FINDINGS:  There are findings consistent with ruptured acute appendicitis. There  are marked inflammatory changes seen within the periappendiceal fat. The  appendix is a difficult to visualize. The tip of the appendix is best  seen anterior to the right common iliac artery on images . There  are several gas bubbles throughout the intra-abdominal fat in the right  lower quadrant. There are several poorly organized partially  rim-enhancing fluid collections within the lower pelvis. These are  located within the pelvic cul-de-sac and anterior to the uterus. There  is also free fluid within both paracolic gutters. Small pockets of fluid  are seen in the left upper quadrant adjacent to the small bowel and  there is free fluid adjacent to the tip of the right lobe of the liver.  I would recommend surgical consultation. There is some slight wall  thickening involving a few of the small bowel loops felt to represent a  reactive enteritis. The uterus, adnexal structures, and urinary bladder  are unremarkable. The liver, gallbladder, adrenal glands, pancreas, and  spleen are normal. There is a benign cyst in the upper pole of the right  kidney. The left kidney is normal. There are some mildly  enlarged  reactive lymph nodes within the right lower quadrant small bowel  mesentery. There is subsegmental atelectasis in both lower lobes. There  is normal vascular enhancement. There are no suspicious osteolytic or  sclerotic lesions within the bony structures.       Impression:          1. There are findings consistent with ruptured acute appendicitis.  2. There are gas bubbles within the intra-abdominal fat. There are  several poorly organized partially rim-enhancing fluid collections  within the lower pelvis. There is no discrete organized abscess. There  is free fluid within the abdomen and pelvis as described above.  3. Slight thickening of the wall of small bowel probably representing  reactive enteritis.  4. Bilateral basilar subsegmental atelectasis.  5. The abnormal results were discussed with GERTRUDIS Reese by  telephone. I would recommend surgical consultation.     Electronically Signed By-Nitin Garcia On:5/30/2020 12:10 PM  This report was finalized on [id number] by  Nitin Garcia, .    XR Chest 1 View [[id number]] Collected:  05/30/20 1040     Updated:  05/30/20 1043    Narrative:       DATE OF EXAM:  5/30/2020 10:20 AM     PROCEDURE:  XR CHEST 1 VW-     INDICATIONS:  Severe sepsis, vomiting, abdominal pain.      COMPARISON:  No Comparisons Available     TECHNIQUE:   Single radiographic view of the chest was obtained.     FINDINGS:  The heart size is normal. The pulmonary vascular markings are normal.  The lung volumes are diminished. There are patchy basilar densities felt  to represent subsegmental atelectasis.  The bony thorax is normal for  age.       Impression:       Low lung volumes with bilateral basilar subsegmental atelectasis.     Electronically Signed By-Nitin Garcia On:5/30/2020 10:41 AM  This report was finalized on [id number] by  Nitin Garcia, .              ASSESSMENT    Septic shock, intra-abdominal source with ruptured appendix and peritonitis as well as UTI  -Patient received  >30 mL/kg IV resuscitation (7 L normal saline in addition to 500 mL colloid)  -OFF Jose Rafael-Synephrine  -Blood cultures no growth to date   -Lactate 2.6, normalized on serial monitoring.  1.2  -C-reactive protein 26.41  -COVID-19 test negative  -Chest x-ray reviewed  -Worse Leukocytosis. May need antifungals. Concern for candida.  Consulted ID. On Zosyn     Acute kidney injury, resolved  -Likely ATN secondary to hypotension and hypovolemia  -Patient has received IV fluid resuscitation  -Avoid further hypotension  -Avoid nephrotoxic agents  -Monitor and trend labs     Acute hypoxic respiratory failure postoperatively, resolved  -Likely secondary to abdominal pain with low inspiratory effort, bilateral atelectasis noted on chest x-ray and CT  -I-S and flutter valve ordered for pulmonary hygiene  -DuoNebs PRN  -on 2 liters nasal cannula      Hyperglycemia, no known history of diabetes.  Possibly reactive  -Sliding-scale insulin for tight glycemic control  -Hemoglobin A1c pending     Acute anemia, present preop  -Serial monitoring, stable postop  -EBL 50 mL     UTI  -Antibiotics as above, Zosyn    Nausea/vomiting  -zofran prn  -check KUB  -possible need for NGT pending KUB results      PUD prophylaxis with Protonix  DVT prophylaxis with SCDs       Replace electrolytes as needed   Ok to increase activity per sx.  OOB to chair   Possible transfer out of ICU if ok with surgery team      Attending physician statement:  Above note scribed by nurse practitioner for me and later reviewed for accuracy. I've examined the patient and reviewed all labs and images.   I have directly participated in the evaluation and management of this patient.  Kirsten edwards MD

## 2020-06-02 NOTE — PLAN OF CARE
Patient downgraded from ICU. Patient able to ambulate to bathroom on her own. Patient doing well and pain is well controlled

## 2020-06-03 LAB
ANION GAP SERPL CALCULATED.3IONS-SCNC: 9 MMOL/L (ref 5–15)
BACTERIA SPEC AEROBE CULT: NO GROWTH
BUN BLD-MCNC: 14 MG/DL (ref 6–20)
BUN/CREAT SERPL: 32.6 (ref 7–25)
CA-I SERPL ISE-MCNC: 1.16 MMOL/L (ref 1.2–1.3)
CALCIUM SPEC-SCNC: 7.7 MG/DL (ref 8.6–10.5)
CHLORIDE SERPL-SCNC: 106 MMOL/L (ref 98–107)
CO2 SERPL-SCNC: 24 MMOL/L (ref 22–29)
CREAT BLD-MCNC: 0.43 MG/DL (ref 0.57–1)
DEPRECATED RDW RBC AUTO: 42 FL (ref 37–54)
EOSINOPHIL # BLD MANUAL: 0.13 10*3/MM3 (ref 0–0.4)
EOSINOPHIL NFR BLD MANUAL: 1 % (ref 0.3–6.2)
ERYTHROCYTE [DISTWIDTH] IN BLOOD BY AUTOMATED COUNT: 13.9 % (ref 12.3–15.4)
GFR SERPL CREATININE-BSD FRML MDRD: >150 ML/MIN/1.73
GLUCOSE BLD-MCNC: 114 MG/DL (ref 65–99)
HCT VFR BLD AUTO: 30.4 % (ref 34–46.6)
HGB BLD-MCNC: 10.2 G/DL (ref 12–15.9)
LYMPHOCYTES # BLD MANUAL: 2.3 10*3/MM3 (ref 0.7–3.1)
LYMPHOCYTES NFR BLD MANUAL: 18 % (ref 19.6–45.3)
LYMPHOCYTES NFR BLD MANUAL: 6 % (ref 5–12)
MAGNESIUM SERPL-MCNC: 2.2 MG/DL (ref 1.6–2.6)
MCH RBC QN AUTO: 29.3 PG (ref 26.6–33)
MCHC RBC AUTO-ENTMCNC: 33.5 G/DL (ref 31.5–35.7)
MCV RBC AUTO: 87.4 FL (ref 79–97)
METAMYELOCYTES NFR BLD MANUAL: 1 % (ref 0–0)
MONOCYTES # BLD AUTO: 0.77 10*3/MM3 (ref 0.1–0.9)
MYELOCYTES NFR BLD MANUAL: 1 % (ref 0–0)
NEUTROPHILS # BLD AUTO: 9.22 10*3/MM3 (ref 1.7–7)
NEUTROPHILS NFR BLD MANUAL: 69 % (ref 42.7–76)
NEUTS BAND NFR BLD MANUAL: 3 % (ref 0–5)
OTHER CELLS %: 1 % (ref 0–0)
PLAT MORPH BLD: NORMAL
PLATELET # BLD AUTO: 386 10*3/MM3 (ref 140–450)
PMV BLD AUTO: 7.2 FL (ref 6–12)
POTASSIUM BLD-SCNC: 3.5 MMOL/L (ref 3.5–5.2)
RBC # BLD AUTO: 3.48 10*6/MM3 (ref 3.77–5.28)
RBC MORPH BLD: NORMAL
SCAN SLIDE: NORMAL
SODIUM BLD-SCNC: 139 MMOL/L (ref 136–145)
WBC MORPH BLD: NORMAL
WBC NRBC COR # BLD: 12.8 10*3/MM3 (ref 3.4–10.8)

## 2020-06-03 PROCEDURE — 25010000002 PIPERACILLIN SOD-TAZOBACTAM PER 1 G: Performed by: INTERNAL MEDICINE

## 2020-06-03 PROCEDURE — 94799 UNLISTED PULMONARY SVC/PX: CPT

## 2020-06-03 PROCEDURE — 99024 POSTOP FOLLOW-UP VISIT: CPT | Performed by: PHYSICIAN ASSISTANT

## 2020-06-03 PROCEDURE — 85007 BL SMEAR W/DIFF WBC COUNT: CPT | Performed by: NURSE PRACTITIONER

## 2020-06-03 PROCEDURE — 82330 ASSAY OF CALCIUM: CPT | Performed by: NURSE PRACTITIONER

## 2020-06-03 PROCEDURE — 80048 BASIC METABOLIC PNL TOTAL CA: CPT | Performed by: NURSE PRACTITIONER

## 2020-06-03 PROCEDURE — 25010000002 ENOXAPARIN PER 10 MG: Performed by: PHYSICIAN ASSISTANT

## 2020-06-03 PROCEDURE — 83735 ASSAY OF MAGNESIUM: CPT | Performed by: NURSE PRACTITIONER

## 2020-06-03 PROCEDURE — 85025 COMPLETE CBC W/AUTO DIFF WBC: CPT | Performed by: NURSE PRACTITIONER

## 2020-06-03 PROCEDURE — 99232 SBSQ HOSP IP/OBS MODERATE 35: CPT | Performed by: HOSPITALIST

## 2020-06-03 RX ADMIN — PIPERACILLIN AND TAZOBACTAM 4.5 G: 4; .5 INJECTION, POWDER, FOR SOLUTION INTRAVENOUS at 18:06

## 2020-06-03 RX ADMIN — ACETAMINOPHEN 650 MG: 325 TABLET, FILM COATED ORAL at 21:35

## 2020-06-03 RX ADMIN — ENOXAPARIN SODIUM 40 MG: 40 INJECTION SUBCUTANEOUS at 18:06

## 2020-06-03 RX ADMIN — PIPERACILLIN AND TAZOBACTAM 4.5 G: 4; .5 INJECTION, POWDER, FOR SOLUTION INTRAVENOUS at 02:01

## 2020-06-03 RX ADMIN — PIPERACILLIN AND TAZOBACTAM 4.5 G: 4; .5 INJECTION, POWDER, FOR SOLUTION INTRAVENOUS at 10:19

## 2020-06-03 RX ADMIN — PANTOPRAZOLE SODIUM 40 MG: 40 INJECTION, POWDER, FOR SOLUTION INTRAVENOUS at 05:15

## 2020-06-03 NOTE — PROGRESS NOTES
Continued Stay Note  [facility]     Patient Name: Suzie uDvall  MRN: [id number]  Today's Date: 6/3/2020    Admit Date: 5/30/2020    Discharge Plan     Row Name 06/03/20 1130       Plan    Plan  Home with [facility] and I (cost of total infusion is $670.00). Patient is agreeable.     Plan Comments  Barrier to discharge: Home IV antibiotics are setup, waiting on MD's to round and to hear from MD's regarding discharge. Needs Picc line before discharge.             Anna Nae[place] [address]   [phone]  office  [phone]  fax  [email or web address]

## 2020-06-03 NOTE — PROGRESS NOTES
[facility] Medicine Services Daily Progress Note      Hospitalist Team  LOS 4 days      Patient Care Team:  Kaylene Schroeder MD as PCP - General (Family Medicine)    Patient Location: 4111/1      Subjective   Subjective   Denies for any new complaint, no nausea or vomiting. No chest pain.    Chief Complaint / Subjective  Chief Complaint   Patient presents with   • Abdominal Pain         Brief Synopsis of Hospital Course/HPI    Ms. Duvall is a 39 y.o.  female who presented to [facility] ED on 05/30/2020 complaining of abdominal pain. Workup in the ER revealed ruptured appendicitis, severe sepsis with shock, and acute UTI. She was given IV fluids and started on empiric antibiotics. General surgery was consulted and she was taken for an emergent exploratory laparotomy with open appendectomy and washout of peritonitis. Postoperatively she was extubated, but had some hypoxia and required vasopressor support for continued hypotension. She was admitted to the ICU for close monitoring and further treatment.         05/31/2020: Patient reports feeling much better today.  Continues to have mild abdominal pain which is improving.  No shortness of air reported on room air.  No chest pain.  Continues to require Jose Rafael-Synephrine drip for hypotension.      06/01:  In bed, no new issues.  Feels ok.  Pain is controlled.  Off pressor.      06/02:  In bed, reports nausea and vomiting.  On clear liquid diet.  KUB obtained.  Patient downgraded to SIPS with monitor and Hospitalist team consulted for medical management.       Date::          ROS      Objective   Objective      Vital Signs  Temp:  [98 °F (36.7 °C)-99 °F (37.2 °C)] 98 °F (36.7 °C)  Heart Rate:  [88-95] 88  Resp:  [11-18] 13  BP: (118-129)/(77-87) 128/82  Oxygen Therapy  SpO2: 98 %  Pulse Oximetry Type: Intermittent  Device (Oxygen Therapy): room air  Device (Oxygen Therapy): room air  Flow (L/min): 2  Flowsheet Rows      First  "Filed Value   Admission Height  162.6 cm (64\") Documented at 05/30/2020 0946   Admission Weight  80.3 kg (177 lb) Documented at 05/30/2020 0946        Intake & Output (last 3 days)       05/31 0701 - 06/01 0700 06/01 0701 - 06/02 0700 06/02 0701 - 06/03 0700 06/03 0701 - 06/04 0700    P.O. 3821 556 6965     I.V. (mL/kg) 474 (5.2) 1278.6 (14.1)      IV Piggyback        Total Intake(mL/kg) 1474 (16.1) 1638.6 (18) 1200 (12.9)     Urine (mL/kg/hr) 900 (0.4) 455 (0.2) 500 (0.2) 750 (1.3)    Emesis/NG output  650      Drains 30 225 385 100    Stool   0 0    Total Output 930 1330 885 850    Net +544 +308.6 +315 -850            Urine Unmeasured Occurrence   3 x 1 x    Stool Unmeasured Occurrence   3 x 1 x        Lines, Drains & Airways    Active LDAs     Name:   Placement date:   Placement time:   Site:   Days:    PICC Triple Lumen 05/30/20 Right Basilic   05/30/20    1830    Basilic   3    Closed/Suction Drain Inferior;Midline 19 Fr.   05/30/20    1617    --   3                  Physical Exam:    Physical Exam   Constitutional: She is oriented to person, place, and time. She appears well-developed and well-nourished. No distress.   HENT:   Head: Normocephalic and atraumatic.   Right Ear: External ear normal.   Left Ear: External ear normal.   Nose: Nose normal.   Mouth/Throat: Oropharynx is clear and moist. No oropharyngeal exudate.   Eyes: Pupils are equal, round, and reactive to light. Conjunctivae and EOM are normal. Right eye exhibits no discharge. Left eye exhibits no discharge. No scleral icterus.   Neck: Normal range of motion. No JVD present. No tracheal deviation present. No thyromegaly present.   Cardiovascular: Normal rate, regular rhythm, normal heart sounds and intact distal pulses. Exam reveals no gallop and no friction rub.   No murmur heard.  Pulmonary/Chest: Effort normal and breath sounds normal. No stridor. No respiratory distress. She has no wheezes. She has no rales. She exhibits no tenderness. "   Abdominal: Soft. Bowel sounds are normal. She exhibits no distension and no mass. There is no tenderness. There is no rebound and no guarding. No hernia.   Musculoskeletal: Normal range of motion. She exhibits no edema, tenderness or deformity.   Lymphadenopathy:     She has no cervical adenopathy.   Neurological: She is alert and oriented to person, place, and time. No cranial nerve deficit or sensory deficit. She exhibits normal muscle tone. Coordination normal.   Skin: Skin is warm and dry. No rash noted. She is not diaphoretic. No erythema.   Psychiatric: She has a normal mood and affect. Her behavior is normal.   Nursing note and vitals reviewed.           Wounds (last 24 hours)      LDA Wound     Row Name 06/03/20 1105 06/03/20 0708 06/02/20 2000       Wound 05/30/20 1617 lower;midline abdomen    Wound - Properties Group Date first assessed: 05/30/20  -SC Time first assessed: 1617  -SC Orientation: lower;midline  -SC Location: abdomen  -SC    Dressing Appearance  dry;intact  -LB  dry;intact;no drainage  -LB  dry;intact;dried drainage  -MS    Closure  --  PAM  -LB  PAM  -MS    Base  --  --  dressing in place, unable to visualize  -MS    Drainage Amount  --  none  -LB  scant  -MS    Dressing Care, Wound  --  border dressing  -LB  --      User Key  (r) = Recorded By, (t) = Taken By, (c) = Cosigned By    Initials Name Provider Type    Sergio Gomez, RN Registered Nurse    Alda Terrell RN Registered Nurse    Lisa Vargas RN Registered Nurse          Procedures:    Procedure(s):  LAPAROTOMY EXPLORATORY          Results Review:     I reviewed the patient's new clinical results.      Lab Results (last 24 hours)     Procedure Component Value Units Date/Time    Blood Culture - Blood, Arm, Right [[id number]] Collected:  05/30/20 1018    Specimen:  Blood from Arm, Right Updated:  06/03/20 1030     Blood Culture No growth at 4 days    Blood Culture - Blood, Arm, Left [[id number]] Collected:   05/30/20 1009    Specimen:  Blood from Arm, Left Updated:  06/03/20 1030     Blood Culture No growth at 4 days    Body Fluid Culture - Body Fluid, Peritoneum [[id number]] Collected:  06/01/20 1731    Specimen:  Body Fluid from Peritoneum Updated:  06/03/20 1001     Body Fluid Culture No growth at 2 days     Gram Stain No organisms seen      Few (2+) WBCs seen    Urine Culture - Urine, Urine, Clean Catch [[id number]]  (Normal) Collected:  06/02/20 1729    Specimen:  Urine, Clean Catch Updated:  06/03/20 0947     Urine Culture No growth    Scan Slide [[id number]] Collected:  06/03/20 0515    Specimen:  Blood Updated:  06/03/20 0645     Scan Slide --     Comment: See Manual Differential Results       Manual Differential [[id number]]  (Abnormal) Collected:  06/03/20 0515    Specimen:  Blood Updated:  06/03/20 0645     Neutrophil % 69.0 %      Lymphocyte % 18.0 %      Monocyte % 6.0 %      Eosinophil % 1.0 %      Bands %  3.0 %      Metamyelocyte % 1.0 %      Myelocyte % 1.0 %      Other Cells % 1.0 %      Neutrophils Absolute 9.22 10*3/mm3      Lymphocytes Absolute 2.30 10*3/mm3      Monocytes Absolute 0.77 10*3/mm3      Eosinophils Absolute 0.13 10*3/mm3      RBC Morphology Normal     WBC Morphology Normal     Platelet Morphology Normal    CBC & Differential [[id number]] Collected:  06/03/20 0515    Specimen:  Blood Updated:  06/03/20 0645    Narrative:       The following orders were created for panel order CBC & Differential.  Procedure                               Abnormality         Status                     ---------                               -----------         ------                     CBC Auto Differential[[id number]]        Abnormal            Final result                 Please view results for these tests on the individual orders.    CBC Auto Differential [[id number]]  (Abnormal) Collected:  06/03/20 0515    Specimen:  Blood Updated:  06/03/20 0645     WBC 12.80 10*3/mm3      RBC 3.48 10*6/mm3       Hemoglobin 10.2 g/dL      Hematocrit 30.4 %      MCV 87.4 fL      MCH 29.3 pg      MCHC 33.5 g/dL      RDW 13.9 %      RDW-SD 42.0 fl      MPV 7.2 fL      Platelets 386 10*3/mm3     Basic Metabolic Panel [[id number]]  (Abnormal) Collected:  06/03/20 0515    Specimen:  Blood Updated:  06/03/20 0603     Glucose 114 mg/dL      BUN 14 mg/dL      Creatinine 0.43 mg/dL      Sodium 139 mmol/L      Potassium 3.5 mmol/L      Chloride 106 mmol/L      CO2 24.0 mmol/L      Calcium 7.7 mg/dL      eGFR Non African Amer >150 mL/min/1.73      BUN/Creatinine Ratio 32.6     Anion Gap 9.0 mmol/L     Narrative:       GFR Normal >60  Chronic Kidney Disease <60  Kidney Failure <15      Magnesium [[id number]]  (Normal) Collected:  06/03/20 0515    Specimen:  Blood Updated:  06/03/20 0603     Magnesium 2.2 mg/dL     Calcium, Ionized [[id number]]  (Abnormal) Collected:  06/03/20 0515    Specimen:  Blood Updated:  06/03/20 0548     Ionized Calcium 1.16 mmol/L     POC Glucose Once [[id number]]  (Normal) Collected:  06/02/20 2203    Specimen:  Blood Updated:  06/02/20 2205     Glucose 102 mg/dL      Comment: Serial Number: [id number]:  [id number]       Urinalysis, Microscopic Only - Urine, Clean Catch [[id number]]  (Abnormal) Collected:  06/02/20 1729    Specimen:  Urine, Clean Catch Updated:  06/02/20 1808     RBC, UA Too Numerous to Count /HPF      WBC, UA 6-12 /HPF      Bacteria, UA None Seen /HPF      Squamous Epithelial Cells, UA 3-6 /HPF      Hyaline Casts, UA 3-6 /LPF      Methodology Manual Light Microscopy    Urinalysis With Culture If Indicated - Urine, Clean Catch [[id number]]  (Abnormal) Collected:  06/02/20 1729    Specimen:  Urine, Clean Catch Updated:  06/02/20 1741     Color, UA Dark Yellow     Comment: Result checked Visual confirmation        Appearance, UA Hazy     Comment: Result checked Visual confirmation        pH, UA 6.5     Specific Gravity, UA 1.034     Glucose, UA Negative     Ketones, UA 80 mg/dL (3+)     Bilirubin,  UA Small (1+)     Comment: Confirmation testing is unavailable.  A serum bilirubin is recommended for further assessment.        Blood, UA Large (3+)     Protein, UA 30 mg/dL (1+)     Leuk Esterase, UA Small (1+)     Nitrite, UA Negative     Urobilinogen, UA 0.2 E.U./dL        No results found for: HGBA1C  Results from last 7 days   Lab Units 05/30/20  1009   INR  1.06       Results from last 7 days   Lab Units 05/31/20  1421   PH, ARTERIAL pH units 7.362   PO2 ART mm Hg 80.4*   PCO2, ARTERIAL mm Hg 32.6*   HCO3 ART mmol/L 18.5*     Lab Results   Component Value Date    LIPASE 9 (L) 05/30/2020     Lab Results   Component Value Date    CHOL 68 05/31/2020    TRIG 42 05/31/2020    HDL 28 (L) 05/31/2020    LDL 32 05/31/2020       Lab Results   Lab Value Date/Time    FINALDX  05/30/2020 1623     Appendix, appendectomy:    Mostly necrotic appendix with acute suppurative appendicitis and periappendicitis    PRINCE/tkd          Microbiology Results (last 10 days)     Procedure Component Value - Date/Time    Urine Culture - Urine, Urine, Clean Catch [[id number]]  (Normal) Collected:  06/02/20 1729    Lab Status:  Preliminary result Specimen:  Urine, Clean Catch Updated:  06/03/20 0947     Urine Culture No growth    Body Fluid Culture - Body Fluid, Peritoneum [[id number]] Collected:  06/01/20 1731    Lab Status:  Preliminary result Specimen:  Body Fluid from Peritoneum Updated:  06/03/20 1001     Body Fluid Culture No growth at 2 days     Gram Stain No organisms seen      Few (2+) WBCs seen    Respiratory Panel, PCR - Swab, Nasopharynx [[id number]]  (Normal) Collected:  05/30/20 1836    Lab Status:  Final result Specimen:  Swab from Nasopharynx Updated:  05/30/20 2051     ADENOVIRUS, PCR Not Detected     Coronavirus 229E Not Detected     Coronavirus HKU1 Not Detected     Coronavirus NL63 Not Detected     Coronavirus OC43 Not Detected     Human Metapneumovirus Not Detected     Human Rhinovirus/Enterovirus Not Detected     Influenza  B PCR Not Detected     Parainfluenza Virus 1 Not Detected     Parainfluenza Virus 2 Not Detected     Parainfluenza Virus 3 Not Detected     Parainfluenza Virus 4 Not Detected     Bordetella pertussis pcr Not Detected     Influenza A H1 2009 PCR Not Detected     Chlamydophila pneumoniae PCR Not Detected     Mycoplasma pneumo by PCR Not Detected     Influenza A PCR Not Detected     Influenza A H3 Not Detected     Influenza A H1 Not Detected     RSV, PCR Not Detected    Narrative:       The coronavirus on the RVP is NOT COVID-19 and is NOT indicative of infection with COVID-19.     MRSA Screen, PCR (Inpatient) - Swab, Nares [[id number]]  (Normal) Collected:  05/30/20 1835    Lab Status:  Final result Specimen:  Swab from Nares Updated:  05/30/20 2040     MRSA PCR No MRSA Detected    COVID PRE-OP / PRE-PROCEDURE SCREENING ORDER (NO ISOLATION) - Swab, Nasopharynx [[id number]] Collected:  05/30/20 1232    Lab Status:  Final result Specimen:  Swab from Nasopharynx Updated:  05/30/20 1328    Narrative:       The following orders were created for panel order COVID PRE-OP / PRE-PROCEDURE SCREENING ORDER (NO ISOLATION) - Swab, Nasopharynx.  Procedure                               Abnormality         Status                     ---------                               -----------         ------                     COVID-19,CEPHEID,COR/VALERIA...[[id number]]  Normal              Final result                 Please view results for these tests on the individual orders.    COVID-19,CEPHEID,COR/VALERIA/PAD IN-HOUSE(OR EMERGENT/ADD-ON),NP SWAB IN TRANSPORT MEDIA 3-4 HR TAT - Swab, Nasopharynx [[id number]]  (Normal) Collected:  05/30/20 1232    Lab Status:  Final result Specimen:  Swab from Nasopharynx Updated:  05/30/20 1328     COVID19 Not Detected    Blood Culture - Blood, Arm, Right [[id number]] Collected:  05/30/20 1018    Lab Status:  Preliminary result Specimen:  Blood from Arm, Right Updated:  06/03/20 1030     Blood Culture No growth at 4  days    Blood Culture - Blood, Arm, Left [[id number]] Collected:  05/30/20 1009    Lab Status:  Preliminary result Specimen:  Blood from Arm, Left Updated:  06/03/20 1030     Blood Culture No growth at 4 days          ECG/EMG Results (most recent)     Procedure Component Value Units Date/Time    ECG 12 Lead [[id number]] Resulted:  05/30/20 1251     Updated:  05/30/20 1251    ECG 12 Lead [[id number]] Collected:  05/30/20 1002     Updated:  05/31/20 0837    Narrative:       HEART RATE= 109  bpm  RR Interval= 552  ms  WV Interval= 140  ms  P Horizontal Axis= 18  deg  P Front Axis= 39  deg  QRSD Interval= 81  ms  QT Interval= 320  ms  QRS Axis= 28  deg  T Wave Axis= 3  deg  - OTHERWISE NORMAL ECG -  Sinus tachycardia  No previous ECG available for comparison  Electronically Signed By: Alfredo Phillips ([place]) 31-May-2020 08:36:30  Date and Time of Study: 2020-05-30 10:02:19                    Ct Abdomen Pelvis With Contrast    Result Date: 5/30/2020   1. There are findings consistent with ruptured acute appendicitis. 2. There are gas bubbles within the intra-abdominal fat. There are several poorly organized partially rim-enhancing fluid collections within the lower pelvis. There is no discrete organized abscess. There is free fluid within the abdomen and pelvis as described above. 3. Slight thickening of the wall of small bowel probably representing reactive enteritis. 4. Bilateral basilar subsegmental atelectasis. 5. The abnormal results were discussed with GERTRUDIS Reese by telephone. I would recommend surgical consultation.  Electronically Signed ByDavid Garcia On:5/30/2020 12:10 PM This report was finalized on [id number] by  Nitin Garcia, .    Xr Chest 1 View    Result Date: 5/31/2020   1. Low lung volumes with bilateral basilar subsegmental atelectasis. 2. The patient may have new pulmonary vascular congestion.  Electronically Signed ByDavid Garcia On:5/31/2020 1:58 PM This report was finalized on [id number] by   Nitin Garcia, .    Xr Chest 1 View    Result Date: 5/30/2020  Low lung volumes with bilateral basilar subsegmental atelectasis.  Electronically Signed By-Nitin Garcia On:5/30/2020 10:41 AM This report was finalized on [id number] by  Nitin Garcia, .    Xr Abdomen Kub    Result Date: 6/2/2020   1. Developing dilated small bowel loops, which may reflect postoperative ileus. Evolving partial small bowel obstruction not excluded. Continued clinical and radiographic follow-up is recommended.  Electronically Signed By-Dr. Denisse Marino MD On:6/2/2020 10:44 AM This report was finalized on [id number] by Dr. Denisse Marino MD.          Xrays, labs reviewed personally by physician.    Medication Review:   I have reviewed the patient's current medication list      Scheduled Meds    enoxaparin 40 mg Subcutaneous Daily   pantoprazole 40 mg Intravenous Q AM   piperacillin-tazobactam 4.5 g Intravenous Q8H   sodium chloride 10 mL Intravenous Q12H       Meds Infusions       Meds PRN  •  acetaminophen  •  HYDROcodone-acetaminophen  •  ipratropium-albuterol  •  Morphine  •  ondansetron **OR** ondansetron  •  potassium chloride  •  promethazine  •  sodium chloride        Assessment/Plan   Assessment/Plan     Active Hospital Problems    Diagnosis  POA   • Nausea and vomiting [R11.2]  No   • Leukocytosis [D72.829]  No   • Obesity (BMI 30-39.9) [E66.9]  Yes   • Normocytic anemia [D64.9]  Yes   • Acute hyperglycemia [R73.9]  Yes   • Acute cystitis without hematuria [N30.00]  Yes   • Acute peritonitis (CMS/HCC) [K65.0]  Yes      Resolved Hospital Problems    Diagnosis Date Resolved POA   • **Ruptured appendicitis [K35.32] 05/30/2020 Yes   • S/P appendectomy [Z90.49] 06/02/2020 Not Applicable   • Severe sepsis with septic shock (CMS/HCC) [A41.9, R65.21] 06/02/2020 Yes   • Acute kidney injury (CMS/HCC) [N17.9] 06/02/2020 Yes   • Acute respiratory failure with hypoxia (CMS/HCC) [J96.01] 06/02/2020 Yes       MEDICAL DECISION MAKING COMPLEXITY  BY PROBLEM:     Acute peritonitis, Leukocytosis improved.  -secondary to ruptured appendicitis  -status post exploratory laparotomy with open appendectomy and washout of peritonitis (05/30/20)  -septic shock resolved; off pressors  -continue Zosyn iv ...advised in total for two week time.  -general surgery following  -ID following and managing abx  -PRN analgesia per surgery     Nausea and vomiting  -KUB shows possible post-op ileus, cannot rule out partial small bowel obstruction  -on clear liquid diet ... Advance as per surgical service.  -IV Protonix daily  -IV Zofran and MS Phenergan PRN     Acute cystitis without hematuria  -POA, but urine culture not done   -obtain urine culture  -on empiric abx as above     Normocytic anemia  -hgb trended down initially, likely secondary to post-op blood loss, but now up to 9.6  -monitor H&H     Acute hyperglycemia  -likely secondary to acute infection/sepsis  -A1c 5.5%  -accu checks q6h with SSI for tight glycemic control     Obesity (BMI 30-39.9)  -BMI 34.44  -encourage lifestyle modifications            VTE Prophylaxis -       VTE Prophylaxis -   Mechanical Order History:      Ordered        05/30/20 2045  Place Sequential Compression Device  Once         05/30/20 1729  Place Sequential Compression Device  Once         05/30/20 1729  Maintain Sequential Compression Device  Continuous         05/30/20 1230  Place Sequential Compression Device on Patient in Pre-Op  Once                 Pharmalogical Order History:     Ordered     Dose Route Frequency Stop    05/30/20 2045  enoxaparin (LOVENOX) syringe 40 mg      40 mg SC Daily --            Code Status -   Code Status and Medical Interventions:   Ordered at: 05/30/20 2045     Code Status:    CPR     Medical Interventions (Level of Support Prior to Arrest):    Full             Discharge Planning          Destination      Coordination has not been started for this encounter.      Durable Medical Equipment      Coordination has  not been started for this encounter.      Dialysis/Infusion      Service Provider Request Status Selected Services Address Phone Number Fax Number    [facility] Selected Infusion and IV Therapy [address], [place] [id number] [phone]      Home Medical Care      Service Provider Request Status Selected Services Address Phone Number Fax Number    [facility] Selected Home Health Services [address] [facility] [address] [phone]      Therapy      Coordination has not been started for this encounter.      Community Resources      Coordination has not been started for this encounter.            Electronically signed by Mary Rawls MD, 06/03/20, 13:01.  [facility] Hospitalist Team

## 2020-06-03 NOTE — PROGRESS NOTES
KPA/PULM/CC PROGRESS NOTE     [place]  The following information was obtained primarily from review of documentation and discussion with Dr. Ruvalcaba as the patient was postanesthesia, drowsy and confused at the time of initial examination in PACU.  Pt reported to the ED provider that she had experienced 5 days of progressive worsening of sharp, bilateral lower quadrant abdominal pain.  The patient was evaluated at [facility] emergency department earlier this week during which time a CT was performed that was reportedly negative with the exception of ovarian cyst and the patient was discharged home with Cipro and Flagyl.  The patient reportedly started having much worse abdominal pain last night around 8 PM and around 9 PM started nausea, vomiting, and diarrhea.  She was evaluated at [facility] emergency department earlier this morning for her complaints of following evaluation was discharged home on and instructed to continue antibiotics.  No diagnostics were performed at that time.  The patient felt worse throughout the day and presented to [facility] emergency department for evaluation.  CT scan of the abdomen revealed findings consistent with acute ruptured appendicitis.  Blood pressure was noted to be 70/30 and the patient received 30 mL/kg IV fluid resuscitation.  She also received empiric antibiotics with Zosyn after blood cultures and urine culture were obtained.  The patient was taken emergently to the OR where she had exploratory lap with open appendectomy and washout of peritonitis with free-flowing purulent and feculent matter contamination noted.  Postoperatively the patient extubated easily.  Since admission she has received 7 L IV fluid resuscitation with normal saline as well as 500 mL of colloid.  Postoperatively she continued to be hypotensive and is currently on a kurtis-drip.     In the emergency department the patient reported that she had also had some dysuria and has been  taking Pyridium at home.  She denied hematemesis, hematochezia, melena.  She reported some mild chest pain and shortness of air.  She also reported that she had lightheadedness with subjective fever and chills.     SUBJECTIVE    5/31: Patient reports feeling much better today.  Continues to have mild abdominal pain which is improving.  No shortness of air reported on room air.  No chest pain.  Continues to require Jose Rafael-Synephrine drip for hypotension    6/1:  In bed, no new issues.  Feels ok.  Pain is controlled.  Remains off pressor  6/2:  In bed, reports nausea and vomiting this AM.    6/3 no SOA/CP     OBJECTIVE    Vitals:    06/03/20 0106 06/03/20 0448 06/03/20 0830 06/03/20 1123   BP: 118/77 123/82 125/83 128/82   BP Location: Left arm Left arm Left arm Left arm   Patient Position: Lying Lying Lying Lying   Pulse: 95 88     Resp: 13 15 14 13   Temp: 98.1 °F (36.7 °C) 98.1 °F (36.7 °C) 98.3 °F (36.8 °C) 98 °F (36.7 °C)   TempSrc: Oral Oral Oral Oral   SpO2: 94% 93% 97% 98%   Weight:  92.7 kg (204 lb 5.9 oz)     Height:          Intake/Output last 3 shifts:  I/O last 3 completed shifts:  In: 2478.6 [P.O.:1200; I.V.:1278.6]  Out: 1825 [Urine:750; Emesis/NG output:650; Drains:425]  Intake/Output this shift:  I/O this shift:  In: -   Out: 850 [Urine:750; Drains:100]    Intake/Output Summary (Last 24 hours) at 6/3/2020 1633  Last data filed at 6/3/2020 1252  Gross per 24 hour   Intake 1200 ml   Output 1735 ml   Net -535 ml       General Appearance:  Alert, cooperative, no distress, appears stated age  Head:  Normocephalic, without obvious abnormality, atraumatic  Eyes:  PERRL, conjunctivae/corneas clear, EOM's intact     Neck:  Supple,  no JVD     Lungs:   Clear to auscultation bilaterally, respirations unlabored  Chest wall:  No tenderness, symmetrical  Heart: Sinus tachycardia, S1 and S2 normal, no murmur, rub   or gallop  Abdomen:  Soft, mild postsurgical discomfort, bowel sounds active all four quadrants, obese.   Dressings and LACHELLE drain  Extremities:  Extremities normal, no cyanosis or edema  Skin:  No rashes or lesions.  Abdomen with postsurgical dressings in place  Neurologic:   Alert and oriented, no focal deficits    Scheduled Meds:    enoxaparin 40 mg Subcutaneous Daily   pantoprazole 40 mg Intravenous Q AM   piperacillin-tazobactam 4.5 g Intravenous Q8H   sodium chloride 10 mL Intravenous Q12H       Continuous Infusions:       PRN Meds:•  acetaminophen  •  HYDROcodone-acetaminophen  •  ipratropium-albuterol  •  Morphine  •  ondansetron **OR** ondansetron  •  potassium chloride  •  promethazine  •  sodium chloride     LABS    CBC  Results from last 7 days   Lab Units 06/03/20  0515 06/02/20  0450 06/01/20  0600 05/31/20  1336 05/31/20  0325 05/30/20  1009   WBC 10*3/mm3 12.80* 21.10* 18.00* 14.20* 17.00* 3.40   RBC 10*6/mm3 3.48* 3.18* 2.78* 2.87* 3.29* 3.79   HEMOGLOBIN g/dL 10.2* 9.6* 8.4* 8.4* 9.7* 11.2*   HEMATOCRIT % 30.4* 27.7* 24.3* 25.4* 30.0* 34.0   MCV fL 87.4 87.3 87.3 88.6 91.1 89.8   PLATELETS 10*3/mm3 386 403 336 313 391 391       CMP   Results from last 7 days   Lab Units 06/03/20  0515 06/02/20  1132 06/02/20  0450 06/01/20  0600 05/31/20  1336 05/31/20  0400 05/30/20  1009   SODIUM mmol/L 139  --  138 140 141 138 132*   POTASSIUM mmol/L 3.5 3.6 3.5 3.9 3.4* 3.9 3.5   CHLORIDE mmol/L 106  --  107 111* 113* 111* 102   CO2 mmol/L 24.0  --  23.0 21.0* 18.0* 16.0* 16.0*   BUN mg/dL 14  --  15 9 9 10 15   CREATININE mg/dL 0.43*  --  0.44* 0.50* 0.58 0.70 1.06*   GLUCOSE mg/dL 114*  --  125* 124* 177* 158* 202*   ALBUMIN g/dL  --   --  2.90*  --  2.90*  --  2.90*   BILIRUBIN mg/dL  --   --  0.4  --  0.4  --  0.9   ALK PHOS U/L  --   --  94  --  38*  --  44   AST (SGOT) U/L  --   --  17  --  23  --  9   ALT (SGPT) U/L  --   --  11  --  12  --  7   LIPASE U/L  --   --   --   --   --   --  9*       TROPONIN  Results from last 7 days   Lab Units 05/30/20  1009   TROPONIN T ng/mL <0.010       CoAg  Results from last  7 days   Lab Units 05/30/20  1009   INR  1.06   APTT seconds 24.4       ABG  Results from last 7 days   Lab Units 05/31/20  1421 05/31/20  0510 05/30/20  1012   PH, ARTERIAL pH units 7.362 7.299* 7.395   PCO2, ARTERIAL mm Hg 32.6* 33.2* 26.9*   PO2 ART mm Hg 80.4* 97.7 86.5   O2 SATURATION ART % 95.5 96.9 96.8   BASE EXCESS ART mmol/L -6.3* -9.2* -7.1*       Microbiology  Microbiology Results (last 10 days)     Procedure Component Value - Date/Time    Urine Culture - Urine, Urine, Clean Catch [[id number]]  (Normal) Collected:  06/02/20 1729    Lab Status:  Preliminary result Specimen:  Urine, Clean Catch Updated:  06/03/20 0947     Urine Culture No growth    Body Fluid Culture - Body Fluid, Peritoneum [[id number]] Collected:  06/01/20 1731    Lab Status:  Preliminary result Specimen:  Body Fluid from Peritoneum Updated:  06/03/20 1001     Body Fluid Culture No growth at 2 days     Gram Stain No organisms seen      Few (2+) WBCs seen    Respiratory Panel, PCR - Swab, Nasopharynx [[id number]]  (Normal) Collected:  05/30/20 1836    Lab Status:  Final result Specimen:  Swab from Nasopharynx Updated:  05/30/20 2051     ADENOVIRUS, PCR Not Detected     Coronavirus 229E Not Detected     Coronavirus HKU1 Not Detected     Coronavirus NL63 Not Detected     Coronavirus OC43 Not Detected     Human Metapneumovirus Not Detected     Human Rhinovirus/Enterovirus Not Detected     Influenza B PCR Not Detected     Parainfluenza Virus 1 Not Detected     Parainfluenza Virus 2 Not Detected     Parainfluenza Virus 3 Not Detected     Parainfluenza Virus 4 Not Detected     Bordetella pertussis pcr Not Detected     Influenza A H1 2009 PCR Not Detected     Chlamydophila pneumoniae PCR Not Detected     Mycoplasma pneumo by PCR Not Detected     Influenza A PCR Not Detected     Influenza A H3 Not Detected     Influenza A H1 Not Detected     RSV, PCR Not Detected    Narrative:       The coronavirus on the RVP is NOT COVID-19 and is NOT indicative of  infection with COVID-19.     MRSA Screen, PCR (Inpatient) - Swab, Nares [[id number]]  (Normal) Collected:  05/30/20 1835    Lab Status:  Final result Specimen:  Swab from Nares Updated:  05/30/20 2040     MRSA PCR No MRSA Detected    COVID PRE-OP / PRE-PROCEDURE SCREENING ORDER (NO ISOLATION) - Swab, Nasopharynx [[id number]] Collected:  05/30/20 1232    Lab Status:  Final result Specimen:  Swab from Nasopharynx Updated:  05/30/20 1328    Narrative:       The following orders were created for panel order COVID PRE-OP / PRE-PROCEDURE SCREENING ORDER (NO ISOLATION) - Swab, Nasopharynx.  Procedure                               Abnormality         Status                     ---------                               -----------         ------                     COVID-19,CEPHEID,COR/VALREIA...[[id number]]  Normal              Final result                 Please view results for these tests on the individual orders.    COVID-19,CEPHEID,COR/VALERIA/PAD IN-HOUSE(OR EMERGENT/ADD-ON),NP SWAB IN TRANSPORT MEDIA 3-4 HR TAT - Swab, Nasopharynx [[id number]]  (Normal) Collected:  05/30/20 1232    Lab Status:  Final result Specimen:  Swab from Nasopharynx Updated:  05/30/20 1328     COVID19 Not Detected    Blood Culture - Blood, Arm, Right [[id number]] Collected:  05/30/20 1018    Lab Status:  Preliminary result Specimen:  Blood from Arm, Right Updated:  06/03/20 1030     Blood Culture No growth at 4 days    Blood Culture - Blood, Arm, Left [[id number]] Collected:  05/30/20 1009    Lab Status:  Preliminary result Specimen:  Blood from Arm, Left Updated:  06/03/20 1030     Blood Culture No growth at 4 days          IMAGING & OTHER STUDIES    Imaging Results (Last 72 Hours)     Procedure Component Value Units Date/Time    XR Abdomen KUB [[id number]] Collected:  06/02/20 1042     Updated:  06/02/20 1047    Narrative:       DATE OF EXAM:  6/2/2020 10:35 AM     PROCEDURE:  XR ABDOMEN KUB-     INDICATIONS:  persistent nausea,recent appendectomy;  K35.32-Acute appendicitis with  perforation and localized peritonitis, without abscess;  R10.9-Unspecified abdominal pain; A41.9-Sepsis, unspecified organism     COMPARISON:  CT abdomen and pelvis 05/30/2020.     TECHNIQUE:   Single radiographic view of the abdomen was obtained.        FINDINGS:  Dilated stacked small bowel loops with air-fluid levels in the central  abdomen compatible with the appearance of either postsurgical ileus or  evolving small bowel obstruction. These findings appear new or  progressed when compared to the CT abdomen topogram of 05/30/2020.  Midline laparotomy staples are present. No gross free intraperitoneal  air is seen on the supine examination. A drainage catheter extends  transversely over the pelvis with the tip terminating over the region of  the left iliac wing.        Impression:          1. Developing dilated small bowel loops, which may reflect postoperative  ileus. Evolving partial small bowel obstruction not excluded. Continued  clinical and radiographic follow-up is recommended.     Electronically Signed By-Dr. Denisse Marino MD On:6/2/2020 10:44 AM  This report was finalized on [id number] by Dr. Denisse Marino MD.              ASSESSMENT    Septic shock, intra-abdominal source with ruptured appendix and peritonitis as well as UTI  -Patient received >30 mL/kg IV resuscitation (7 L normal saline in addition to 500 mL colloid)  -OFF Jose Rafael-Synephrine  -Blood cultures no growth to date   -Lactate 2.6, normalized on serial monitoring.  1.2  -C-reactive protein 26.41  -COVID-19 test negative  -Chest x-ray reviewed  -On Zosyn     Acute kidney injury, resolved  -Likely ATN secondary to hypotension and hypovolemia  -Patient has received IV fluid resuscitation  -Avoid further hypotension  -Avoid nephrotoxic agents  -Monitor and trend labs     Acute hypoxic respiratory failure postoperatively, resolved  -Likely secondary to abdominal pain with low inspiratory effort, bilateral  atelectasis noted on chest x-ray and CT  -I-S and flutter valve ordered for pulmonary hygiene  -DuoNebs PRN  -on RA     Hyperglycemia, no known history of diabetes.  Possibly reactive  -Sliding-scale insulin for tight glycemic control     Acute anemia, present preop  -Serial monitoring, stable postop     UTI  -Antibiotics as above, Zosyn    Nausea/vomiting  -zofran prn     PUD prophylaxis with Protonix  DVT prophylaxis with SCDs    Replace electrolytes as needed   Ok to increase activity per sx.  OOB to chair

## 2020-06-03 NOTE — PROGRESS NOTES
Infectious Diseases Progress Note      LOS: 4 days   Patient Care Team:  Kaylene Schroeder MD as PCP - General (Family Medicine)    Chief Complaint: Fatigue    Subjective       The patient has been afebrile for the last 24 hours.  The patient is on room air, hemodynamically stable, and is tolerating antimicrobial therapy.      Review of Systems:   Review of Systems   Constitutional: Positive for fatigue.   HENT: Negative.    Respiratory: Negative.    Gastrointestinal: Positive for abdominal pain.        Improved   Musculoskeletal: Negative.    Skin: Negative.    Neurological: Negative.    Psychiatric/Behavioral: Negative.         Objective     Vital Signs  Temp:  [98 °F (36.7 °C)-99 °F (37.2 °C)] 98 °F (36.7 °C)  Heart Rate:  [88-95] 88  Resp:  [11-18] 13  BP: (118-129)/(77-87) 128/82    Physical Exam:  Physical Exam   Constitutional: She is oriented to person, place, and time. She appears well-developed and well-nourished.   HENT:   Head: Normocephalic and atraumatic.   Eyes: Pupils are equal, round, and reactive to light. Conjunctivae and EOM are normal.   Neck: Neck supple.   Cardiovascular: Normal rate, regular rhythm and normal heart sounds.   Pulmonary/Chest: Effort normal and breath sounds normal.   Abdominal: Soft. Bowel sounds are normal. There is tenderness.   Diminished bowel sounds, LACHELLE drain with serosanguineous fluid   Musculoskeletal: Normal range of motion.   Neurological: She is alert and oriented to person, place, and time.   Skin: Skin is warm and dry.   Psychiatric: She has a normal mood and affect.   Vitals reviewed.       Results Review:    I have reviewed all clinical data, test, lab, and imaging results.     Radiology  No Radiology Exams Resulted Within Past 24 Hours    Cardiology    Laboratory    Results from last 7 days   Lab Units 06/03/20  0515 06/02/20  0450 06/01/20  0600 05/31/20  1336 05/31/20  0325 05/30/20  1009   WBC 10*3/mm3 12.80* 21.10* 18.00* 14.20* 17.00* 3.40   HEMOGLOBIN  g/dL 10.2* 9.6* 8.4* 8.4* 9.7* 11.2*   HEMATOCRIT % 30.4* 27.7* 24.3* 25.4* 30.0* 34.0   PLATELETS 10*3/mm3 386 403 336 313 391 391     Results from last 7 days   Lab Units 06/03/20  0515 06/02/20  1132 06/02/20  0450 06/01/20  0600 05/31/20  1336 05/31/20  0400 05/30/20  1009   SODIUM mmol/L 139  --  138 140 141 138 132*   POTASSIUM mmol/L 3.5 3.6 3.5 3.9 3.4* 3.9 3.5   CHLORIDE mmol/L 106  --  107 111* 113* 111* 102   CO2 mmol/L 24.0  --  23.0 21.0* 18.0* 16.0* 16.0*   BUN mg/dL 14  --  15 9 9 10 15   CREATININE mg/dL 0.43*  --  0.44* 0.50* 0.58 0.70 1.06*   GLUCOSE mg/dL 114*  --  125* 124* 177* 158* 202*   ALBUMIN g/dL  --   --  2.90*  --  2.90*  --  2.90*   BILIRUBIN mg/dL  --   --  0.4  --  0.4  --  0.9   ALK PHOS U/L  --   --  94  --  38*  --  44   AST (SGOT) U/L  --   --  17  --  23  --  9   ALT (SGPT) U/L  --   --  11  --  12  --  7   LIPASE U/L  --   --   --   --   --   --  9*   CALCIUM mg/dL 7.7*  --  8.5* 8.2* 7.6* 7.1* 8.6                 Microbiology   Microbiology Results (last 10 days)     Procedure Component Value - Date/Time    Urine Culture - Urine, Urine, Clean Catch [[id number]]  (Normal) Collected:  06/02/20 1729    Lab Status:  Preliminary result Specimen:  Urine, Clean Catch Updated:  06/03/20 0947     Urine Culture No growth    Body Fluid Culture - Body Fluid, Peritoneum [[id number]] Collected:  06/01/20 1731    Lab Status:  Preliminary result Specimen:  Body Fluid from Peritoneum Updated:  06/03/20 1001     Body Fluid Culture No growth at 2 days     Gram Stain No organisms seen      Few (2+) WBCs seen    Respiratory Panel, PCR - Swab, Nasopharynx [[id number]]  (Normal) Collected:  05/30/20 1836    Lab Status:  Final result Specimen:  Swab from Nasopharynx Updated:  05/30/20 2051     ADENOVIRUS, PCR Not Detected     Coronavirus 229E Not Detected     Coronavirus HKU1 Not Detected     Coronavirus NL63 Not Detected     Coronavirus OC43 Not Detected     Human Metapneumovirus Not Detected     Human  Rhinovirus/Enterovirus Not Detected     Influenza B PCR Not Detected     Parainfluenza Virus 1 Not Detected     Parainfluenza Virus 2 Not Detected     Parainfluenza Virus 3 Not Detected     Parainfluenza Virus 4 Not Detected     Bordetella pertussis pcr Not Detected     Influenza A H1 2009 PCR Not Detected     Chlamydophila pneumoniae PCR Not Detected     Mycoplasma pneumo by PCR Not Detected     Influenza A PCR Not Detected     Influenza A H3 Not Detected     Influenza A H1 Not Detected     RSV, PCR Not Detected    Narrative:       The coronavirus on the RVP is NOT COVID-19 and is NOT indicative of infection with COVID-19.     MRSA Screen, PCR (Inpatient) - Swab, Nares [[id number]]  (Normal) Collected:  05/30/20 1835    Lab Status:  Final result Specimen:  Swab from Nares Updated:  05/30/20 2040     MRSA PCR No MRSA Detected    COVID PRE-OP / PRE-PROCEDURE SCREENING ORDER (NO ISOLATION) - Swab, Nasopharynx [[id number]] Collected:  05/30/20 1232    Lab Status:  Final result Specimen:  Swab from Nasopharynx Updated:  05/30/20 1328    Narrative:       The following orders were created for panel order COVID PRE-OP / PRE-PROCEDURE SCREENING ORDER (NO ISOLATION) - Swab, Nasopharynx.  Procedure                               Abnormality         Status                     ---------                               -----------         ------                     COVID-19,CEPHEID,COR/VALERIA...[[id number]]  Normal              Final result                 Please view results for these tests on the individual orders.    COVID-19,CEPHEID,COR/VALREIA/PAD IN-HOUSE(OR EMERGENT/ADD-ON),NP SWAB IN TRANSPORT MEDIA 3-4 HR TAT - Swab, Nasopharynx [[id number]]  (Normal) Collected:  05/30/20 1232    Lab Status:  Final result Specimen:  Swab from Nasopharynx Updated:  05/30/20 1328     COVID19 Not Detected    Blood Culture - Blood, Arm, Right [[id number]] Collected:  05/30/20 1018    Lab Status:  Preliminary result Specimen:  Blood from Arm, Right  Updated:  06/03/20 1030     Blood Culture No growth at 4 days    Blood Culture - Blood, Arm, Left [[id number]] Collected:  05/30/20 1009    Lab Status:  Preliminary result Specimen:  Blood from Arm, Left Updated:  06/03/20 1030     Blood Culture No growth at 4 days          Medication Review:       Schedule Meds    enoxaparin 40 mg Subcutaneous Daily   pantoprazole 40 mg Intravenous Q AM   piperacillin-tazobactam 4.5 g Intravenous Q8H   sodium chloride 10 mL Intravenous Q12H       Infusion Meds       PRN Meds  •  acetaminophen  •  HYDROcodone-acetaminophen  •  ipratropium-albuterol  •  Morphine  •  ondansetron **OR** ondansetron  •  potassium chloride  •  promethazine  •  sodium chloride        Assessment/Plan       Antimicrobial Therapy   1.  IV Zosyn     day  2.      Day  3.      Day  4.      Day  5.      Day      Assessment     Ruptured appendicitis  -LACHELLE drain cultures negative so far     Peritonitis secondary to above     Mild septic shock resolved currently off pressors     Plan     Continue IV Zosyn 4.5 g IV every 8 hours-patient will need 2 weeks of IV antibiotics   is working on setting up home IV Zosyn for 2 weeks  Supportive care  Labs in a..    Yfn Nobles MD  06/03/20  16:10     Note is dictated utilizing voice recognition software/Dragon

## 2020-06-03 NOTE — PLAN OF CARE
Problem: Patient Care Overview  Goal: Plan of Care Review  Outcome: Ongoing (interventions implemented as appropriate)  Note:   Pt is doing well ambulating. Tolerating clears and has been advanced to low residue. Anticipate discharge in the next few days.

## 2020-06-03 NOTE — PLAN OF CARE
Pt resting well. Pt ambulating with one assist. Pt complained of headache but no other pain. Headache treated per MAR. No discharge plans at this time. Will continue to monitor.     Problem: Patient Care Overview  Goal: Plan of Care Review  Outcome: Ongoing (interventions implemented as appropriate)  Flowsheets (Taken 6/3/2020 0103)  Progress: improving  Plan of Care Reviewed With: patient

## 2020-06-03 NOTE — PROGRESS NOTES
General Surgery Progress Note     LOS: 4 days   Patient Care Team:  Kaylene Schroeder MD as PCP - General (Family Medicine)    Subjective     Chief Complaint   Patient presents with   • Abdominal Pain       Interval History:   Feeling well. Is hungry. Wants to go home.  Has had multiple bowel movements.    Objective     Vital Signs  Temp:  [98 °F (36.7 °C)-99 °F (37.2 °C)] 98 °F (36.7 °C)  Heart Rate:  [88-95] 88  Resp:  [11-18] 13  BP: (118-129)/(77-87) 128/82    Physical Exam   Constitutional: She appears well-developed and well-nourished. No distress.   HENT:   Head: Normocephalic and atraumatic.   Eyes: EOM are normal.   Neck: Normal range of motion.   Cardiovascular: Normal rate.   Pulmonary/Chest: Effort normal. No respiratory distress.   Abdominal:   Incision with appropriate serous drainage. No erythema or pus around incision or LACHELLE drain insertion site. LACHELLE serous.   Neurological: She is alert.   Skin: Skin is warm and dry.   Psychiatric: She has a normal mood and affect. Her behavior is normal.   Vitals reviewed.       Results Review:    Lab Results (last 24 hours)     Procedure Component Value Units Date/Time    Blood Culture - Blood, Arm, Right [[id number]] Collected:  05/30/20 1018    Specimen:  Blood from Arm, Right Updated:  06/03/20 1030     Blood Culture No growth at 4 days    Blood Culture - Blood, Arm, Left [[id number]] Collected:  05/30/20 1009    Specimen:  Blood from Arm, Left Updated:  06/03/20 1030     Blood Culture No growth at 4 days    Body Fluid Culture - Body Fluid, Peritoneum [[id number]] Collected:  06/01/20 1731    Specimen:  Body Fluid from Peritoneum Updated:  06/03/20 1001     Body Fluid Culture No growth at 2 days     Gram Stain No organisms seen      Few (2+) WBCs seen    Urine Culture - Urine, Urine, Clean Catch [[id number]]  (Normal) Collected:  06/02/20 1729    Specimen:  Urine, Clean Catch Updated:  06/03/20 0947     Urine Culture No growth    Scan Slide [[id number]] Collected:   06/03/20 0515    Specimen:  Blood Updated:  06/03/20 0645     Scan Slide --     Comment: See Manual Differential Results       Manual Differential [[id number]]  (Abnormal) Collected:  06/03/20 0515    Specimen:  Blood Updated:  06/03/20 0645     Neutrophil % 69.0 %      Lymphocyte % 18.0 %      Monocyte % 6.0 %      Eosinophil % 1.0 %      Bands %  3.0 %      Metamyelocyte % 1.0 %      Myelocyte % 1.0 %      Other Cells % 1.0 %      Neutrophils Absolute 9.22 10*3/mm3      Lymphocytes Absolute 2.30 10*3/mm3      Monocytes Absolute 0.77 10*3/mm3      Eosinophils Absolute 0.13 10*3/mm3      RBC Morphology Normal     WBC Morphology Normal     Platelet Morphology Normal    CBC & Differential [[id number]] Collected:  06/03/20 0515    Specimen:  Blood Updated:  06/03/20 0645    Narrative:       The following orders were created for panel order CBC & Differential.  Procedure                               Abnormality         Status                     ---------                               -----------         ------                     CBC Auto Differential[[id number]]        Abnormal            Final result                 Please view results for these tests on the individual orders.    CBC Auto Differential [[id number]]  (Abnormal) Collected:  06/03/20 0515    Specimen:  Blood Updated:  06/03/20 0645     WBC 12.80 10*3/mm3      RBC 3.48 10*6/mm3      Hemoglobin 10.2 g/dL      Hematocrit 30.4 %      MCV 87.4 fL      MCH 29.3 pg      MCHC 33.5 g/dL      RDW 13.9 %      RDW-SD 42.0 fl      MPV 7.2 fL      Platelets 386 10*3/mm3     Basic Metabolic Panel [[id number]]  (Abnormal) Collected:  06/03/20 0515    Specimen:  Blood Updated:  06/03/20 0603     Glucose 114 mg/dL      BUN 14 mg/dL      Creatinine 0.43 mg/dL      Sodium 139 mmol/L      Potassium 3.5 mmol/L      Chloride 106 mmol/L      CO2 24.0 mmol/L      Calcium 7.7 mg/dL      eGFR Non African Amer >150 mL/min/1.73      BUN/Creatinine Ratio 32.6     Anion Gap 9.0 mmol/L      Narrative:       GFR Normal >60  Chronic Kidney Disease <60  Kidney Failure <15      Magnesium [[id number]]  (Normal) Collected:  06/03/20 0515    Specimen:  Blood Updated:  06/03/20 0603     Magnesium 2.2 mg/dL     Calcium, Ionized [[id number]]  (Abnormal) Collected:  06/03/20 0515    Specimen:  Blood Updated:  06/03/20 0548     Ionized Calcium 1.16 mmol/L     POC Glucose Once [[id number]]  (Normal) Collected:  06/02/20 2203    Specimen:  Blood Updated:  06/02/20 2205     Glucose 102 mg/dL      Comment: Serial Number: [id number]:  [id number]       Urinalysis, Microscopic Only - Urine, Clean Catch [[id number]]  (Abnormal) Collected:  06/02/20 1729    Specimen:  Urine, Clean Catch Updated:  06/02/20 1808     RBC, UA Too Numerous to Count /HPF      WBC, UA 6-12 /HPF      Bacteria, UA None Seen /HPF      Squamous Epithelial Cells, UA 3-6 /HPF      Hyaline Casts, UA 3-6 /LPF      Methodology Manual Light Microscopy    Urinalysis With Culture If Indicated - Urine, Clean Catch [[id number]]  (Abnormal) Collected:  06/02/20 1729    Specimen:  Urine, Clean Catch Updated:  06/02/20 1741     Color, UA Dark Yellow     Comment: Result checked Visual confirmation        Appearance, UA Hazy     Comment: Result checked Visual confirmation        pH, UA 6.5     Specific Gravity, UA 1.034     Glucose, UA Negative     Ketones, UA 80 mg/dL (3+)     Bilirubin, UA Small (1+)     Comment: Confirmation testing is unavailable.  A serum bilirubin is recommended for further assessment.        Blood, UA Large (3+)     Protein, UA 30 mg/dL (1+)     Leuk Esterase, UA Small (1+)     Nitrite, UA Negative     Urobilinogen, UA 0.2 E.U./dL        Imaging Results (Last 24 Hours)     ** No results found for the last 24 hours. **          I reviewed the patient's new clinical results.    Medication Review:    Current Facility-Administered Medications:   •  acetaminophen (TYLENOL) tablet 650 mg, 650 mg, Oral, Q6H PRN, Joshua Suh, APRN, 650 mg at  06/02/20 2117  •  enoxaparin (LOVENOX) syringe 40 mg, 40 mg, Subcutaneous, Daily, Emilia Mathur PA-C, 40 mg at 06/02/20 1755  •  HYDROcodone-acetaminophen (NORCO) 5-325 MG per tablet 1 tablet, 1 tablet, Oral, Q6H PRN, Day, Marilin, APRN, 1 tablet at 06/01/20 1309  •  ipratropium-albuterol (DUO-NEB) nebulizer solution 3 mL, 3 mL, Nebulization, Q2H PRN, Day, Marilin, APRN  •  Morphine sulfate (PF) injection 2 mg, 2 mg, Intravenous, Q4H PRN, Joshua Suh APRN, 2 mg at 06/01/20 0204  •  ondansetron (ZOFRAN) tablet 4 mg, 4 mg, Oral, Q6H PRN **OR** ondansetron (ZOFRAN) injection 4 mg, 4 mg, Intravenous, Q6H PRN, Day, Marilin, APRN, 4 mg at 06/02/20 1755  •  pantoprazole (PROTONIX) injection 40 mg, 40 mg, Intravenous, Q AM, Day, Marilin, APRN, 40 mg at 06/03/20 0515  •  piperacillin-tazobactam (ZOSYN) IVPB 4.5 g in 100 mL NS (CD), 4.5 g, Intravenous, Q8H, Yfn Nobles MD, 4.5 g at 06/03/20 1019  •  potassium chloride 10 mEq in 100 mL IVPB, 10 mEq, Intravenous, Q1H PRN, Day, Marilin, APRN, Last Rate: 100 mL/hr at 06/02/20 0952, 10 mEq at 06/02/20 0952  •  promethazine (PHENERGAN) suppository 12.5 mg, 12.5 mg, Rectal, Q6H PRN, Joshua Suh APRN, 12.5 mg at 06/02/20 0437  •  sodium chloride 0.9 % flush 10 mL, 10 mL, Intravenous, Q12H, Day, Marilin, APRN, 10 mL at 06/02/20 2005  •  sodium chloride 0.9 % flush 10 mL, 10 mL, Intravenous, PRN, Day, Marilin, APRN    Assessment/Plan     Active Problems:    Normocytic anemia    Acute hyperglycemia    Acute cystitis without hematuria    Nausea and vomiting    Leukocytosis    Obesity (BMI 30-39.9)    Acute peritonitis (CMS/HCC)      Advance diet as tolerated  Surgically stable     Plan for disposition: Likely ready for discharge tomorrow    Plan discussed with Dr. Ruvalcaba, who agrees.    Emilia Mathur PA-C  06/03/20  13:04

## 2020-06-04 ENCOUNTER — READMISSION MANAGEMENT (OUTPATIENT)
Dept: CALL CENTER | Facility: HOSPITAL | Age: 40
End: 2020-06-04

## 2020-06-04 VITALS
RESPIRATION RATE: 12 BRPM | HEIGHT: 64 IN | TEMPERATURE: 97.3 F | DIASTOLIC BLOOD PRESSURE: 61 MMHG | SYSTOLIC BLOOD PRESSURE: 98 MMHG | HEART RATE: 72 BPM | BODY MASS INDEX: 34.89 KG/M2 | OXYGEN SATURATION: 96 % | WEIGHT: 204.37 LBS

## 2020-06-04 LAB
ALBUMIN SERPL-MCNC: 2.6 G/DL (ref 3.5–5.2)
ALBUMIN/GLOB SERPL: 0.8 G/DL
ALP SERPL-CCNC: 71 U/L (ref 39–117)
ALT SERPL W P-5'-P-CCNC: 12 U/L (ref 1–33)
ANION GAP SERPL CALCULATED.3IONS-SCNC: 8 MMOL/L (ref 5–15)
ANISOCYTOSIS BLD QL: ABNORMAL
AST SERPL-CCNC: 14 U/L (ref 1–32)
BACTERIA SPEC AEROBE CULT: NORMAL
BACTERIA SPEC AEROBE CULT: NORMAL
BILIRUB SERPL-MCNC: 0.4 MG/DL (ref 0.2–1.2)
BUN BLD-MCNC: 5 MG/DL (ref 6–20)
BUN BLD-MCNC: ABNORMAL MG/DL
BUN/CREAT SERPL: ABNORMAL
CALCIUM SPEC-SCNC: 7.6 MG/DL (ref 8.6–10.5)
CHLORIDE SERPL-SCNC: 103 MMOL/L (ref 98–107)
CO2 SERPL-SCNC: 26 MMOL/L (ref 22–29)
CREAT BLD-MCNC: 0.33 MG/DL (ref 0.57–1)
DEPRECATED RDW RBC AUTO: 42.4 FL (ref 37–54)
EOSINOPHIL # BLD MANUAL: 0.12 10*3/MM3 (ref 0–0.4)
EOSINOPHIL NFR BLD MANUAL: 1 % (ref 0.3–6.2)
ERYTHROCYTE [DISTWIDTH] IN BLOOD BY AUTOMATED COUNT: 13.8 % (ref 12.3–15.4)
GFR SERPL CREATININE-BSD FRML MDRD: >150 ML/MIN/1.73
GLOBULIN UR ELPH-MCNC: 3.3 GM/DL
GLUCOSE BLD-MCNC: 135 MG/DL (ref 65–99)
HCT VFR BLD AUTO: 27.1 % (ref 34–46.6)
HGB BLD-MCNC: 9.2 G/DL (ref 12–15.9)
LARGE PLATELETS: ABNORMAL
LYMPHOCYTES # BLD MANUAL: 1.3 10*3/MM3 (ref 0.7–3.1)
LYMPHOCYTES NFR BLD MANUAL: 11 % (ref 19.6–45.3)
LYMPHOCYTES NFR BLD MANUAL: 9 % (ref 5–12)
MAGNESIUM SERPL-MCNC: 1.6 MG/DL (ref 1.6–2.6)
MCH RBC QN AUTO: 29.2 PG (ref 26.6–33)
MCHC RBC AUTO-ENTMCNC: 34.1 G/DL (ref 31.5–35.7)
MCV RBC AUTO: 85.6 FL (ref 79–97)
MICROCYTES BLD QL: ABNORMAL
MONOCYTES # BLD AUTO: 1.06 10*3/MM3 (ref 0.1–0.9)
NEUTROPHILS # BLD AUTO: 9.32 10*3/MM3 (ref 1.7–7)
NEUTROPHILS NFR BLD MANUAL: 77 % (ref 42.7–76)
NEUTS BAND NFR BLD MANUAL: 2 % (ref 0–5)
NRBC SPEC MANUAL: 1 /100 WBC (ref 0–0.2)
PLATELET # BLD AUTO: 477 10*3/MM3 (ref 140–450)
PMV BLD AUTO: 7.1 FL (ref 6–12)
POIKILOCYTOSIS BLD QL SMEAR: ABNORMAL
POTASSIUM BLD-SCNC: 3 MMOL/L (ref 3.5–5.2)
PROT SERPL-MCNC: 5.9 G/DL (ref 6–8.5)
RBC # BLD AUTO: 3.17 10*6/MM3 (ref 3.77–5.28)
SCAN SLIDE: NORMAL
SODIUM BLD-SCNC: 137 MMOL/L (ref 136–145)
WBC MORPH BLD: NORMAL
WBC NRBC COR # BLD: 11.8 10*3/MM3 (ref 3.4–10.8)

## 2020-06-04 PROCEDURE — 85025 COMPLETE CBC W/AUTO DIFF WBC: CPT | Performed by: NURSE PRACTITIONER

## 2020-06-04 PROCEDURE — 25010000003 POTASSIUM CHLORIDE 10 MEQ/100ML SOLUTION: Performed by: NURSE PRACTITIONER

## 2020-06-04 PROCEDURE — 83735 ASSAY OF MAGNESIUM: CPT | Performed by: NURSE PRACTITIONER

## 2020-06-04 PROCEDURE — 80053 COMPREHEN METABOLIC PANEL: CPT | Performed by: INTERNAL MEDICINE

## 2020-06-04 PROCEDURE — 85007 BL SMEAR W/DIFF WBC COUNT: CPT | Performed by: NURSE PRACTITIONER

## 2020-06-04 PROCEDURE — 25010000002 PIPERACILLIN SOD-TAZOBACTAM PER 1 G: Performed by: INTERNAL MEDICINE

## 2020-06-04 PROCEDURE — 99239 HOSP IP/OBS DSCHRG MGMT >30: CPT | Performed by: HOSPITALIST

## 2020-06-04 RX ORDER — POTASSIUM CHLORIDE 20 MEQ/1
40 TABLET, EXTENDED RELEASE ORAL AS NEEDED
Status: DISCONTINUED | OUTPATIENT
Start: 2020-06-04 | End: 2020-06-04 | Stop reason: HOSPADM

## 2020-06-04 RX ORDER — OXYCODONE HYDROCHLORIDE AND ACETAMINOPHEN 5; 325 MG/1; MG/1
1 TABLET ORAL EVERY 6 HOURS PRN
Qty: 20 TABLET | Refills: 0 | Status: SHIPPED | OUTPATIENT
Start: 2020-06-04

## 2020-06-04 RX ADMIN — Medication 10 ML: at 08:13

## 2020-06-04 RX ADMIN — PIPERACILLIN AND TAZOBACTAM 4.5 G: 4; .5 INJECTION, POWDER, FOR SOLUTION INTRAVENOUS at 02:45

## 2020-06-04 RX ADMIN — PIPERACILLIN AND TAZOBACTAM 4.5 G: 4; .5 INJECTION, POWDER, FOR SOLUTION INTRAVENOUS at 10:54

## 2020-06-04 RX ADMIN — PANTOPRAZOLE SODIUM 40 MG: 40 INJECTION, POWDER, FOR SOLUTION INTRAVENOUS at 05:18

## 2020-06-04 RX ADMIN — POTASSIUM CHLORIDE 10 MEQ: 7.46 INJECTION, SOLUTION INTRAVENOUS at 06:25

## 2020-06-04 RX ADMIN — POTASSIUM CHLORIDE 40 MEQ: 1500 TABLET, EXTENDED RELEASE ORAL at 08:13

## 2020-06-04 RX ADMIN — PIPERACILLIN AND TAZOBACTAM 4.5 G: 4; .5 INJECTION, POWDER, FOR SOLUTION INTRAVENOUS at 17:06

## 2020-06-04 RX ADMIN — POTASSIUM CHLORIDE 40 MEQ: 1500 TABLET, EXTENDED RELEASE ORAL at 11:13

## 2020-06-04 NOTE — OUTREACH NOTE
Prep Survey      Responses   [facility] facility patient discharged from?  [facility]   Is LACE score < 7 ?  No   Eligibility  [facility]  [facility]   Date of Admission  05/30/20   Date of Discharge  06/04/20   Discharge diagnosis  Ruptured appendicitis , sepsis   COVID-19 Test Status  Negative   Does the patient have one of the following disease processes/diagnoses(primary or secondary)?  Sepsis   Does the patient have Home health ordered?  Yes   What is the Home health agency?   [facility]   Is there a DME ordered?  No   Medication alerts for this patient  IV antibiotics   Prep survey completed?  Yes          Suzie Garcia, RN

## 2020-06-04 NOTE — PLAN OF CARE
Pt has had no complaints, besides a minor headache at the beginning of shift. Pt tolerating meds/food by mouth well. Pt ambulating very well, around unit without complication.

## 2020-06-04 NOTE — PROGRESS NOTES
General Surgery Progress Note     LOS: 5 days   Patient Care Team:  Kaylene Schroeder MD as PCP - General (Family Medicine)    Subjective     Chief Complaint   Patient presents with   • Abdominal Pain       Interval History:   Feeling well and ready to go home. LACHELLE drain fell out this morning. Tolerating regular diet, having regular BMs.    Objective     Vital Signs  Temp:  [97.3 °F (36.3 °C)-98.4 °F (36.9 °C)] 97.3 °F (36.3 °C)  Heart Rate:  [72-92] 72  Resp:  [12-14] 12  BP: ()/(61-82) 98/61    Physical Exam   Constitutional: She appears well-developed and well-nourished. No distress.   HENT:   Head: Normocephalic and atraumatic.   Eyes: EOM are normal.   Neck: Normal range of motion.   Cardiovascular: Normal rate.   Pulmonary/Chest: Effort normal. No respiratory distress.   Abdominal:   Incision without erythema. Moderate serous drainage. Previous LACHELLE drain insertion site without erythema; mild serous drainage.   Neurological: She is alert.   Skin: Skin is warm and dry.   Psychiatric: She has a normal mood and affect. Her behavior is normal.   Vitals reviewed.       Results Review:    Lab Results (last 24 hours)     Procedure Component Value Units Date/Time    Blood Culture - Blood, Arm, Right [[id number]] Collected:  05/30/20 1018    Specimen:  Blood from Arm, Right Updated:  06/04/20 1030     Blood Culture No growth at 5 days    Blood Culture - Blood, Arm, Left [[id number]] Collected:  05/30/20 1009    Specimen:  Blood from Arm, Left Updated:  06/04/20 1030     Blood Culture No growth at 5 days    Body Fluid Culture - Body Fluid, Peritoneum [[id number]] Collected:  06/01/20 1731    Specimen:  Body Fluid from Peritoneum Updated:  06/04/20 0845     Body Fluid Culture No growth at 3 days     Gram Stain No organisms seen      Few (2+) WBCs seen    BUN [[id number]]  (Abnormal) Collected:  06/04/20 0533    Specimen:  Blood Updated:  06/04/20 0721     BUN 5 mg/dL     CBC & Differential [[id number]] Collected:   06/04/20 0533    Specimen:  Blood Updated:  06/04/20 0637    Narrative:       The following orders were created for panel order CBC & Differential.  Procedure                               Abnormality         Status                     ---------                               -----------         ------                     CBC Auto Differential[[id number]]        Abnormal            Final result                 Please view results for these tests on the individual orders.    CBC Auto Differential [[id number]]  (Abnormal) Collected:  06/04/20 0533    Specimen:  Blood Updated:  06/04/20 0637     WBC 11.80 10*3/mm3      RBC 3.17 10*6/mm3      Hemoglobin 9.2 g/dL      Hematocrit 27.1 %      MCV 85.6 fL      MCH 29.2 pg      MCHC 34.1 g/dL      RDW 13.8 %      RDW-SD 42.4 fl      MPV 7.1 fL      Platelets 477 10*3/mm3     Scan Slide [[id number]] Collected:  06/04/20 0533    Specimen:  Blood Updated:  06/04/20 0637     Scan Slide --     Comment: See Manual Differential Results       Manual Differential [[id number]]  (Abnormal) Collected:  06/04/20 0533    Specimen:  Blood Updated:  06/04/20 0637     Neutrophil % 77.0 %      Lymphocyte % 11.0 %      Monocyte % 9.0 %      Eosinophil % 1.0 %      Bands %  2.0 %      Neutrophils Absolute 9.32 10*3/mm3      Lymphocytes Absolute 1.30 10*3/mm3      Monocytes Absolute 1.06 10*3/mm3      Eosinophils Absolute 0.12 10*3/mm3      nRBC 1.0 /100 WBC      Anisocytosis Slight/1+     Microcytes Slight/1+     Poikilocytes Slight/1+     WBC Morphology Normal     Large Platelets Slight/1+    Magnesium [[id number]]  (Normal) Collected:  06/04/20 0533    Specimen:  Blood Updated:  06/04/20 0600     Magnesium 1.6 mg/dL     Comprehensive Metabolic Panel [[id number]]  (Abnormal) Collected:  06/04/20 0533    Specimen:  Blood Updated:  06/04/20 0556     Glucose 135 mg/dL      BUN --     Comment: Testing performed by alternate method        Creatinine 0.33 mg/dL      Sodium 137 mmol/L      Potassium 3.0  mmol/L      Chloride 103 mmol/L      CO2 26.0 mmol/L      Calcium 7.6 mg/dL      Total Protein 5.9 g/dL      Albumin 2.60 g/dL      ALT (SGPT) 12 U/L      AST (SGOT) 14 U/L      Alkaline Phosphatase 71 U/L      Total Bilirubin 0.4 mg/dL      eGFR Non African Amer >150 mL/min/1.73      Globulin 3.3 gm/dL      A/G Ratio 0.8 g/dL      BUN/Creatinine Ratio --     Comment: Testing not performed.        Anion Gap 8.0 mmol/L     Narrative:       GFR Normal >60  Chronic Kidney Disease <60  Kidney Failure <15      SCANNED - LABS [[id number]] Resulted:  05/30/20      Updated:  06/03/20 2122    SCANNED - LABS [[id number]] Resulted:  05/30/20      Updated:  06/03/20 2122    Urine Culture - Urine, Urine, Clean Catch [[id number]]  (Normal) Collected:  06/02/20 1729    Specimen:  Urine, Clean Catch Updated:  06/03/20 1709     Urine Culture No growth        Imaging Results (Last 24 Hours)     ** No results found for the last 24 hours. **          I reviewed the patient's new clinical results.    Medication Review:    Current Facility-Administered Medications:   •  acetaminophen (TYLENOL) tablet 650 mg, 650 mg, Oral, Q6H PRN, Joshua Suh APRTODD, 650 mg at 06/03/20 2135  •  enoxaparin (LOVENOX) syringe 40 mg, 40 mg, Subcutaneous, Daily, Emilia Mathur PA-C, 40 mg at 06/03/20 1806  •  HYDROcodone-acetaminophen (NORCO) 5-325 MG per tablet 1 tablet, 1 tablet, Oral, Q6H PRN, Day, Marilin, APRN, 1 tablet at 06/01/20 1309  •  ipratropium-albuterol (DUO-NEB) nebulizer solution 3 mL, 3 mL, Nebulization, Q2H PRN, Day, Marilin, APRN  •  Morphine sulfate (PF) injection 2 mg, 2 mg, Intravenous, Q4H PRN, Joshua Suh APRN, 2 mg at 06/01/20 0204  •  ondansetron (ZOFRAN) tablet 4 mg, 4 mg, Oral, Q6H PRN **OR** ondansetron (ZOFRAN) injection 4 mg, 4 mg, Intravenous, Q6H PRN, Day, Marilin, APRN, 4 mg at 06/02/20 1755  •  pantoprazole (PROTONIX) injection 40 mg, 40 mg, Intravenous, Q AM, Day, Marilin, APRN, 40 mg at 06/04/20 0518  •  piperacillin-tazobactam  (ZOSYN) IVPB 4.5 g in 100 mL NS (CD), 4.5 g, Intravenous, Q8H, Yfn Nobles MD, 4.5 g at 06/04/20 1054  •  potassium chloride (K-DUR,KLOR-CON) CR tablet 40 mEq, 40 mEq, Oral, PRN, Mary Rawls MD, 40 mEq at 06/04/20 1113  •  potassium chloride 10 mEq in 100 mL IVPB, 10 mEq, Intravenous, Q1H PRN, Day, Marilin, APRN, Last Rate: 100 mL/hr at 06/04/20 0625, 10 mEq at 06/04/20 0625  •  promethazine (PHENERGAN) suppository 12.5 mg, 12.5 mg, Rectal, Q6H PRN, Joshua Suh, APRN, 12.5 mg at 06/02/20 0437  •  sodium chloride 0.9 % flush 10 mL, 10 mL, Intravenous, Q12H, Day, Marilin, APRN, 10 mL at 06/04/20 0813  •  sodium chloride 0.9 % flush 10 mL, 10 mL, Intravenous, PRN, Day, Marilin, APRN    Assessment/Plan     Active Problems:    Normocytic anemia    Acute hyperglycemia    Acute cystitis without hematuria    Nausea and vomiting    Leukocytosis    Obesity (BMI 30-39.9)    Acute peritonitis (CMS/HCC)      Surgically stable for discharge home today    Plan for disposition: per primary team and infectious disease    Emilia Mathur PA-C  06/04/20  12:39

## 2020-06-04 NOTE — PROGRESS NOTES
Infectious Diseases Progress Note      LOS: 5 days   Patient Care Team:  Kaylene Schroeder MD as PCP - General (Family Medicine)    Chief Complaint: No new complaints, ready to go home    Subjective       The patient has been afebrile for the last 24 hours.  The patient is on room air, hemodynamically stable, and is tolerating antimicrobial therapy.      Review of Systems:   Review of Systems   Constitutional: Negative.    HENT: Negative.    Respiratory: Negative.    Cardiovascular: Negative.    Gastrointestinal: Positive for abdominal pain.        Improved   Genitourinary: Negative.    Musculoskeletal: Negative.    Skin: Negative.    Neurological: Negative.    Psychiatric/Behavioral: Negative.         Objective     Vital Signs  Temp:  [97.3 °F (36.3 °C)-98.4 °F (36.9 °C)] 97.3 °F (36.3 °C)  Heart Rate:  [72-92] 72  Resp:  [12-14] 12  BP: ()/(61-82) 98/61    Physical Exam:  Physical Exam   Constitutional: She is oriented to person, place, and time. She appears well-developed and well-nourished.   HENT:   Head: Normocephalic and atraumatic.   Eyes: Pupils are equal, round, and reactive to light. Conjunctivae and EOM are normal.   Neck: Neck supple.   Cardiovascular: Normal rate, regular rhythm and normal heart sounds.   Pulmonary/Chest: Effort normal and breath sounds normal.   Abdominal: Soft. Bowel sounds are normal. There is tenderness.   Moe dressings are clean and dry   Musculoskeletal: Normal range of motion.   Neurological: She is alert and oriented to person, place, and time.   Skin: Skin is warm and dry.   Psychiatric: She has a normal mood and affect.   Vitals reviewed.       Results Review:    I have reviewed all clinical data, test, lab, and imaging results.     Radiology  No Radiology Exams Resulted Within Past 24 Hours    Cardiology    Laboratory    Results from last 7 days   Lab Units 06/04/20  0533 06/03/20  0515 06/02/20  0450 06/01/20  0600 05/31/20  1336 05/31/20  0325 05/30/20  1009    WBC 10*3/mm3 11.80* 12.80* 21.10* 18.00* 14.20* 17.00* 3.40   HEMOGLOBIN g/dL 9.2* 10.2* 9.6* 8.4* 8.4* 9.7* 11.2*   HEMATOCRIT % 27.1* 30.4* 27.7* 24.3* 25.4* 30.0* 34.0   PLATELETS 10*3/mm3 477* 386 403 336 313 391 391     Results from last 7 days   Lab Units 06/04/20  0533 06/03/20  0515 06/02/20  1132 06/02/20  0450 06/01/20  0600 05/31/20  1336 05/31/20  0400 05/30/20  1009   SODIUM mmol/L 137 139  --  138 140 141 138 132*   POTASSIUM mmol/L 3.0* 3.5 3.6 3.5 3.9 3.4* 3.9 3.5   CHLORIDE mmol/L 103 106  --  107 111* 113* 111* 102   CO2 mmol/L 26.0 24.0  --  23.0 21.0* 18.0* 16.0* 16.0*   BUN  5* 14  --  15 9 9 10 15   CREATININE mg/dL 0.33* 0.43*  --  0.44* 0.50* 0.58 0.70 1.06*   GLUCOSE mg/dL 135* 114*  --  125* 124* 177* 158* 202*   ALBUMIN g/dL 2.60*  --   --  2.90*  --  2.90*  --  2.90*   BILIRUBIN mg/dL 0.4  --   --  0.4  --  0.4  --  0.9   ALK PHOS U/L 71  --   --  94  --  38*  --  44   AST (SGOT) U/L 14  --   --  17  --  23  --  9   ALT (SGPT) U/L 12  --   --  11  --  12  --  7   LIPASE U/L  --   --   --   --   --   --   --  9*   CALCIUM mg/dL 7.6* 7.7*  --  8.5* 8.2* 7.6* 7.1* 8.6                 Microbiology   Microbiology Results (last 10 days)     Procedure Component Value - Date/Time    Urine Culture - Urine, Urine, Clean Catch [[id number]]  (Normal) Collected:  06/02/20 1729    Lab Status:  Final result Specimen:  Urine, Clean Catch Updated:  06/03/20 1709     Urine Culture No growth    Body Fluid Culture - Body Fluid, Peritoneum [[id number]] Collected:  06/01/20 1731    Lab Status:  Preliminary result Specimen:  Body Fluid from Peritoneum Updated:  06/04/20 0845     Body Fluid Culture No growth at 3 days     Gram Stain No organisms seen      Few (2+) WBCs seen    Respiratory Panel, PCR - Swab, Nasopharynx [[id number]]  (Normal) Collected:  05/30/20 1836    Lab Status:  Final result Specimen:  Swab from Nasopharynx Updated:  05/30/20 2051     ADENOVIRUS, PCR Not Detected     Coronavirus 229E Not  Detected     Coronavirus HKU1 Not Detected     Coronavirus NL63 Not Detected     Coronavirus OC43 Not Detected     Human Metapneumovirus Not Detected     Human Rhinovirus/Enterovirus Not Detected     Influenza B PCR Not Detected     Parainfluenza Virus 1 Not Detected     Parainfluenza Virus 2 Not Detected     Parainfluenza Virus 3 Not Detected     Parainfluenza Virus 4 Not Detected     Bordetella pertussis pcr Not Detected     Influenza A H1 2009 PCR Not Detected     Chlamydophila pneumoniae PCR Not Detected     Mycoplasma pneumo by PCR Not Detected     Influenza A PCR Not Detected     Influenza A H3 Not Detected     Influenza A H1 Not Detected     RSV, PCR Not Detected    Narrative:       The coronavirus on the RVP is NOT COVID-19 and is NOT indicative of infection with COVID-19.     MRSA Screen, PCR (Inpatient) - Swab, Nares [[id number]]  (Normal) Collected:  05/30/20 1835    Lab Status:  Final result Specimen:  Swab from Nares Updated:  05/30/20 2040     MRSA PCR No MRSA Detected    COVID PRE-OP / PRE-PROCEDURE SCREENING ORDER (NO ISOLATION) - Swab, Nasopharynx [[id number]] Collected:  05/30/20 1232    Lab Status:  Final result Specimen:  Swab from Nasopharynx Updated:  05/30/20 1328    Narrative:       The following orders were created for panel order COVID PRE-OP / PRE-PROCEDURE SCREENING ORDER (NO ISOLATION) - Swab, Nasopharynx.  Procedure                               Abnormality         Status                     ---------                               -----------         ------                     COVID-19,CEPHEID,COR/VALERIA...[[id number]]  Normal              Final result                 Please view results for these tests on the individual orders.    COVID-19,CEPHEID,COR/VALERIA/PAD IN-HOUSE(OR EMERGENT/ADD-ON),NP SWAB IN TRANSPORT MEDIA 3-4 HR TAT - Swab, Nasopharynx [[id number]]  (Normal) Collected:  05/30/20 1232    Lab Status:  Final result Specimen:  Swab from Nasopharynx Updated:  05/30/20 1328     COVID19 Not  Detected    Blood Culture - Blood, Arm, Right [[id number]] Collected:  05/30/20 1018    Lab Status:  Final result Specimen:  Blood from Arm, Right Updated:  06/04/20 1030     Blood Culture No growth at 5 days    Blood Culture - Blood, Arm, Left [[id number]] Collected:  05/30/20 1009    Lab Status:  Final result Specimen:  Blood from Arm, Left Updated:  06/04/20 1030     Blood Culture No growth at 5 days          Medication Review:       Schedule Meds    enoxaparin 40 mg Subcutaneous Daily   pantoprazole 40 mg Intravenous Q AM   piperacillin-tazobactam 4.5 g Intravenous Q8H   sodium chloride 10 mL Intravenous Q12H       Infusion Meds       PRN Meds  •  acetaminophen  •  HYDROcodone-acetaminophen  •  ipratropium-albuterol  •  Morphine  •  ondansetron **OR** ondansetron  •  potassium chloride  •  potassium chloride  •  promethazine  •  sodium chloride        Assessment/Plan       Antimicrobial Therapy   1.  IV Zosyn     day  2.      Day  3.      Day  4.      Day  5.      Day      Assessment     Ruptured appendicitis  -LACHELLE drain cultures negative so far     Peritonitis secondary to above     Mild septic shock resolved currently off pressors     Plan     Continue IV Zosyn 4.5 g IV every 8 hours-patient will need 2 weeks of IV antibiotics  Patient already has a PICC line-please remove when IV antibiotics are completed  Weekly labs CBC/creatinine  Supportive care  Okay to discharge from Infectious Disease standpoint when IV antibiotics are arranged    Please fax all post discharge lab results, imaging studies and correspondence to this fax number [phone]  For any question or concern please contact our service number [phone]    MORRIS Clay  06/04/20  14:25     Note is dictated utilizing voice recognition software/Dragon

## 2020-06-04 NOTE — CONSULTS
"Nutrition Services    Patient Name:  Suzie Duvall  YOB: 1980  MRN: [id number]  Admit Date:  5/30/2020    Comments:  Pt stated that she does not need supplement and that appetite has improved.     Reason for Assessment                Reason for Assessment    Reason For Assessment Follow up per protocol    NPO/Clear Liquids x3 days (6/2/2020)     Diagnosis H&P: No PMH    Current Problems/Procedures:    Septic shock, intra-abdominal source with ruptured appendix & peritonitis as well as UTI  -off pressor    Ruptured appendicitis  -sp ex lap with open appendectomy & washout of peritonitis on improved     LESLIE  -resolved     Acute hypoxic respiratory failure postoperatively  -Room Air     Hyperglycemia, no known hx of DM, possibly reactive    Acute anemia    UTI  -antibx    N/V         Nutrition/Diet History                Nutrition/Diet History    Narrative     6/4: Pt stated that appetite has improved. Does not like Boost Breeze and would like to have discontinued. Confirmed that she has not lost any weight. Does not want regular Boost plus.     6/2:Called pt via phone to discuss appetite/intakes, no answer     Functional Status Independent with ADLs       Food Allergies NKFA     Factors Affecting Nutritional Intake N/V, s/p appendectomy         Anthropometrics             Anthropometrics    Height 162.6 cm (64\")    Weight 92.7 kg (204 lb)    6/02/20 - noted increase in wt, pt is positive 5.8 Liters since admission       Admit Weight    Admit Weight 80.3 kg (177 lb)    5/30/20       Ideal Body Weight (IBW)    Ideal Body Weight (IBW) 120 lb    % Ideal Body Weight 170%       Usual Body Weight (UBW)    Usual Body Weight 170 lb     % Usual Body Weight 120%    Weight Hx  No wt hx       Body Mass Index (BMI)    BMI (kg/m2) 35.08           Labs/Medications                Labs    Pertinent Lab Results Gluc 135 (H), K 3.0 (L), Crt. .33 (L), BUN 5 (L), Ca 7.6 (L), H/H 9.2/27.1 (L)        Medications    Pertinent " Medications  protonix; zosyn;          Physical Findings                Physical Findings    Overall Physical Appearance Unable to observe r/t limiting face-to-face contact in the context of the COVID19 pandemic     Edema  1+ trace edema in BL ankles/feet     Gastrointestinal N/V  +BM 6/2/20     Tubes N/A     Oral/Mouth Cavity No swallowing or chewing issues per pt.      Skin Surgical incision on lower abdomen         Estimated/Assessed Needs              Calorie Requirements     Height Used for Calorie Calculations       Weight Used for Calorie Calculations      Formula Chosen for Calorie Calculations       Estimated Calorie Requirements (kcals/day)              Protein Requirements    Weight Used For Protein Calculations       Est Protein Requirement Amount (gms/kg)       Estimated Protein Requirements (gms/day)          Fluid Requirements     Estimated Fluid Requirements (mL/day)            Fluid Deficit       Current Sodium Level (mEq/L)      Desired Sodium Level (mEq/L)      Estimated Fluid Deficit Needs (L)           Nutrition Prescription Ordered                Nutrition Prescription PO    Current PO Diet  RESLO      Supplement Boost Breeze Clear TID         Nutrition Prescription EN    Enteral Route -     TF Modular -     TF Delivery Method -     Current Ordered TF  -     Current Ordered Water flush  -     TF Observation  -        Nutrition Prescription TPN    TPN Route -     Current Ordered TPN Volume  -     Dextrose (gm/kcals)  -     Amino Acid (gm/kcals) -     Lipids (mL/Concentration/Frequency)  -     TPN Observation  -          Evaluation of Received Nutrient/Fluid Intake                PO Evaluation    % PO Intake 6/3 50% of one meal doc    6/2: 25%-50% of meals documented while on clear liquids        EN Evaluation    TF Changes -     TF Residual -     TF Tolerance -     Average EN Delivered  -        TPN Evaluation    Total Number of Days on TPN  -           Clinical Course      Clinical Nutrition  Course Details  Clear Liquids 5/30  NPO 5/31  Clear Liquids 5/31 to 6/2  RESLO 6/3         Problem/Interventions:                     Nutrition Diagnoses Problem 1      Problem 1   Inadequate oral intake r/t decreased ability to consume sufficient energy AEB NPO/Clear Liquid diet x3 days-resolved      Nutrition Diagnoses Problem 2      Problem 2            Intervention Goal                Intervention Goal    General Eats > 65% of meals     Declined further supplements          Nutrition Intervention                Nutrition Intervention    RD/Tech Action Discontinue boost breeze.          Nutrition Prescription                Nutrition Prescription PO      Diet  RESLO      Supplement         Nutrition Prescription EN    Enteral Prescription -        Nutrition Prescription TPN    TPN Prescription -         Monitor/Evaluation                Monitor/Evaluation    Monitor Diet advancement & po intakes/tolerance, BMs, N/V, abd pain/distention, labs (electrolytes, BUN/Crt, glucose), wt for changes, skin integrity, at next encounter           Electronically signed by:  Beth Zambrano RD  06/04/20 09:36

## 2020-06-04 NOTE — PLAN OF CARE
Problem: Patient Care Overview  Goal: Plan of Care Review  Outcome: Ongoing (interventions implemented as appropriate)  Note:   Pt doing great. Tolerating diet. Home health set up for Zosyn. Should be able to go home today once seen by Dr. Ruvalcaba.

## 2020-06-04 NOTE — DISCHARGE INSTR - ACTIVITY
Weekly CBC and BMP while on Antibiotics  Remove PICC line after completion of Antibiotics on 6/13  As tolerated

## 2020-06-04 NOTE — NURSING NOTE
"Pt's LACHELLE drain out. Pt states, \"I got up to go to the bathroom, and my drain fell out.\" I am still awaiting Dr. Ruvalcaba's call back about original drain issue, of no suction due to air leak in line.   "

## 2020-06-04 NOTE — DISCHARGE SUMMARY
[facility] Medicine Services  DISCHARGE SUMMARY        Prepared For PCP:  Kaylene Schroeder MD    Patient Name: Suzie Duvall  : 1980  MRN: [id number]      Date of Admission:   2020    Date of Discharge:  2020    Length of stay:  LOS: 5 days     Hospital Course     Presenting Problem:   Ruptured appendicitis [K35.32]  Abdominal pain, unspecified abdominal location [R10.9]  Sepsis, due to unspecified organism, unspecified whether acute organ dysfunction present (CMS/HCC) [A41.9]  Ruptured appendicitis [K35.32]  Ruptured appendicitis [K35.32]      Active Hospital Problems    Diagnosis  POA   • Nausea and vomiting [R11.2]  No   • Leukocytosis [D72.829]  No   • Obesity (BMI 30-39.9) [E66.9]  Yes   • Normocytic anemia [D64.9]  Yes   • Acute hyperglycemia [R73.9]  Yes   • Acute cystitis without hematuria [N30.00]  Yes   • Acute peritonitis (CMS/[facility]) [K65.0]  Yes      Resolved Hospital Problems    Diagnosis Date Resolved POA   • **Ruptured appendicitis [K35.32] 2020 Yes   • S/P appendectomy [Z90.49] 2020 Not Applicable   • Severe sepsis with septic shock (CMS/[facility]) [A41.9, R65.21] 2020 Yes   • Acute kidney injury (CMS/[facility]) [N17.9] 2020 Yes   • Acute respiratory failure with hypoxia (CMS/[facility]) [J96.01] 2020 Yes           Hospital Course by problem list.    Acute peritonitis, Leukocytosis improved.  -secondary to ruptured appendicitis  -status post exploratory laparotomy with open appendectomy and washout of peritonitis (20)  -septic shock resolved; off pressors  -continue Zosyn iv ...advised in total for two week time.  -general surgery following  -ID advised iv zosyn in total of two week therapy, d/c picc line once done with course of iv antibiotics.  Weekly cbc and bmp while on antibiotics.  -PRN analgesia per surgery     Nausea and vomiting improved.  -KUB shows possible post-op ileus resolved now.  - tolerating low residue diet.     Acute  cystitis without hematuria  -POA, but urine culture not done   -obtain urine culture  -on empiric abx as above     Normocytic anemia  -hgb trended down initially, likely secondary to post-op blood loss, but now up to 9.6  -monitor H&H     Acute hyperglycemia  -likely secondary to acute infection/sepsis  -A1c 5.5%  -accu checks q6h with SSI for tight glycemic control     Obesity (BMI 30-39.9)  -BMI 34.44  -encourage lifestyle modifications                Recommendation for Outpatient Providers:     Follow up with FP and general surgery in two week time.  D/c  Picc line once done with course of iv antibiotics.      Reasons For Change In Medications and Indications for New Medications:        Day of Discharge     HPI:       Vital Signs:   Temp:  [97.3 °F (36.3 °C)-98.4 °F (36.9 °C)] 97.3 °F (36.3 °C)  Heart Rate:  [72-92] 72  Resp:  [12-14] 12  BP: ()/(61-82) 98/61     Physical Exam:  Physical Exam   Constitutional: She is oriented to person, place, and time. She appears well-developed and well-nourished. No distress.   HENT:   Head: Normocephalic and atraumatic.   Right Ear: External ear normal.   Left Ear: External ear normal.   Nose: Nose normal.   Mouth/Throat: Oropharynx is clear and moist. No oropharyngeal exudate.   Eyes: Pupils are equal, round, and reactive to light. Conjunctivae and EOM are normal. Right eye exhibits no discharge. Left eye exhibits no discharge. No scleral icterus.   Neck: Normal range of motion. No JVD present. No tracheal deviation present. No thyromegaly present.   Cardiovascular: Normal rate, regular rhythm, normal heart sounds and intact distal pulses. Exam reveals no gallop and no friction rub.   No murmur heard.  Pulmonary/Chest: Effort normal and breath sounds normal. No stridor. No respiratory distress. She has no wheezes. She has no rales. She exhibits no tenderness.   Abdominal: Soft. Bowel sounds are normal. She exhibits no distension and no mass. There is no tenderness.  There is no rebound and no guarding. No hernia.   Musculoskeletal: Normal range of motion. She exhibits no edema, tenderness or deformity.   Lymphadenopathy:     She has no cervical adenopathy.   Neurological: She is alert and oriented to person, place, and time. No cranial nerve deficit or sensory deficit. She exhibits normal muscle tone. Coordination normal.   Skin: Skin is warm and dry. No rash noted. She is not diaphoretic. No erythema.   Psychiatric: She has a normal mood and affect. Her behavior is normal.   Nursing note and vitals reviewed.      Pertinent  and/or Most Recent Results     Results from last 7 days   Lab Units 06/04/20  0533 06/03/20  0515 06/02/20  1132 06/02/20  0450 06/01/20  0600 05/31/20  1336 05/31/20  0400 05/31/20  0325 05/30/20  1009   WBC 10*3/mm3 11.80* 12.80*  --  21.10* 18.00* 14.20*  --  17.00* 3.40   HEMOGLOBIN g/dL 9.2* 10.2*  --  9.6* 8.4* 8.4*  --  9.7* 11.2*   HEMATOCRIT % 27.1* 30.4*  --  27.7* 24.3* 25.4*  --  30.0* 34.0   PLATELETS 10*3/mm3 477* 386  --  403 336 313  --  391 391   SODIUM mmol/L 137 139  --  138 140 141 138  --  132*   POTASSIUM mmol/L 3.0* 3.5 3.6 3.5 3.9 3.4* 3.9  --  3.5   CHLORIDE mmol/L 103 106  --  107 111* 113* 111*  --  102   CO2 mmol/L 26.0 24.0  --  23.0 21.0* 18.0* 16.0*  --  16.0*   BUN  5* 14  --  15 9 9 10  --  15   CREATININE mg/dL 0.33* 0.43*  --  0.44* 0.50* 0.58 0.70  --  1.06*   GLUCOSE mg/dL 135* 114*  --  125* 124* 177* 158*  --  202*   CALCIUM mg/dL 7.6* 7.7*  --  8.5* 8.2* 7.6* 7.1*  --  8.6     Results from last 7 days   Lab Units 06/04/20  0533 06/02/20  0450 05/31/20  1336 05/30/20  1009   BILIRUBIN mg/dL 0.4 0.4 0.4 0.9   ALK PHOS U/L 71 94 38* 44   ALT (SGPT) U/L 12 11 12 7   AST (SGOT) U/L 14 17 23 9   PROTIME Seconds  --   --   --  11.0   INR   --   --   --  1.06   APTT seconds  --   --   --  24.4     Results from last 7 days   Lab Units 05/31/20  0325   CHOLESTEROL mg/dL 68   TRIGLYCERIDES mg/dL 42   HDL CHOL mg/dL 28*      Results from last 7 days   Lab Units 06/01/20  1650 05/31/20  1336 05/31/20  0325 05/30/20  1838 05/30/20  1015  05/30/20  1009   HEMOGLOBIN A1C %  --   --   --  5.5  --   --   --    TROPONIN T ng/mL  --   --   --   --   --   --  <0.010   PROCALCITONIN ng/mL  --   --   --   --  7.41*  --   --    LACTATE mmol/L 1.2 2.2* 1.6 1.9  --    < >  --     < > = values in this interval not displayed.       Brief Urine Lab Results  (Last result in the past 365 days)      Color   Clarity   Blood   Leuk Est   Nitrite   Protein   CREAT   Urine HCG        06/02/20 1729 Dark Yellow  Comment:  Result checked Visual confirmation Hazy  Comment:  Result checked Visual confirmation Large (3+) Small (1+) Negative 30 mg/dL (1+)               Microbiology Results Abnormal     Procedure Component Value - Date/Time    Blood Culture - Blood, Arm, Right [[id number]] Collected:  05/30/20 1018    Lab Status:  Final result Specimen:  Blood from Arm, Right Updated:  06/04/20 1030     Blood Culture No growth at 5 days    Blood Culture - Blood, Arm, Left [[id number]] Collected:  05/30/20 1009    Lab Status:  Final result Specimen:  Blood from Arm, Left Updated:  06/04/20 1030     Blood Culture No growth at 5 days    Body Fluid Culture - Body Fluid, Peritoneum [[id number]] Collected:  06/01/20 1731    Lab Status:  Preliminary result Specimen:  Body Fluid from Peritoneum Updated:  06/04/20 0845     Body Fluid Culture No growth at 3 days     Gram Stain No organisms seen      Few (2+) WBCs seen    Urine Culture - Urine, Urine, Clean Catch [[id number]]  (Normal) Collected:  06/02/20 1729    Lab Status:  Final result Specimen:  Urine, Clean Catch Updated:  06/03/20 1709     Urine Culture No growth    Respiratory Panel, PCR - Swab, Nasopharynx [[id number]]  (Normal) Collected:  05/30/20 1836    Lab Status:  Final result Specimen:  Swab from Nasopharynx Updated:  05/30/20 2051     ADENOVIRUS, PCR Not Detected     Coronavirus 229E Not Detected     Coronavirus  HKU1 Not Detected     Coronavirus NL63 Not Detected     Coronavirus OC43 Not Detected     Human Metapneumovirus Not Detected     Human Rhinovirus/Enterovirus Not Detected     Influenza B PCR Not Detected     Parainfluenza Virus 1 Not Detected     Parainfluenza Virus 2 Not Detected     Parainfluenza Virus 3 Not Detected     Parainfluenza Virus 4 Not Detected     Bordetella pertussis pcr Not Detected     Influenza A H1 2009 PCR Not Detected     Chlamydophila pneumoniae PCR Not Detected     Mycoplasma pneumo by PCR Not Detected     Influenza A PCR Not Detected     Influenza A H3 Not Detected     Influenza A H1 Not Detected     RSV, PCR Not Detected    Narrative:       The coronavirus on the RVP is NOT COVID-19 and is NOT indicative of infection with COVID-19.     MRSA Screen, PCR (Inpatient) - Swab, Nares [[id number]]  (Normal) Collected:  05/30/20 1835    Lab Status:  Final result Specimen:  Swab from Nares Updated:  05/30/20 2040     MRSA PCR No MRSA Detected    COVID PRE-OP / PRE-PROCEDURE SCREENING ORDER (NO ISOLATION) - Swab, Nasopharynx [[id number]] Collected:  05/30/20 1232    Lab Status:  Final result Specimen:  Swab from Nasopharynx Updated:  05/30/20 1328    Narrative:       The following orders were created for panel order COVID PRE-OP / PRE-PROCEDURE SCREENING ORDER (NO ISOLATION) - Swab, Nasopharynx.  Procedure                               Abnormality         Status                     ---------                               -----------         ------                     COVID-19,CEPHEID,COR/VALERIA...[[id number]]  Normal              Final result                 Please view results for these tests on the individual orders.    COVID-19,CEPHEID,COR/VALERIA/PAD IN-HOUSE(OR EMERGENT/ADD-ON),NP SWAB IN TRANSPORT MEDIA 3-4 HR TAT - Swab, Nasopharynx [[id number]]  (Normal) Collected:  05/30/20 1232    Lab Status:  Final result Specimen:  Swab from Nasopharynx Updated:  05/30/20 1328     COVID19 Not Detected          Ct  Abdomen Pelvis With Contrast    Result Date: 5/30/2020  Impression:  1. There are findings consistent with ruptured acute appendicitis. 2. There are gas bubbles within the intra-abdominal fat. There are several poorly organized partially rim-enhancing fluid collections within the lower pelvis. There is no discrete organized abscess. There is free fluid within the abdomen and pelvis as described above. 3. Slight thickening of the wall of small bowel probably representing reactive enteritis. 4. Bilateral basilar subsegmental atelectasis. 5. The abnormal results were discussed with GERTRUDIS Reese by telephone. I would recommend surgical consultation.  Electronically Signed By-Nitin Garcia On:5/30/2020 12:10 PM This report was finalized on [id number] by  Nitin Garcia, .    Xr Chest 1 View    Result Date: 5/31/2020  Impression:  1. Low lung volumes with bilateral basilar subsegmental atelectasis. 2. The patient may have new pulmonary vascular congestion.  Electronically Signed By-Nitin Garcia On:5/31/2020 1:58 PM This report was finalized on [id number] by  Nitin Garcia, .    Xr Chest 1 View    Result Date: 5/30/2020  Impression: Low lung volumes with bilateral basilar subsegmental atelectasis.  Electronically Signed By-Nitin Garcia On:5/30/2020 10:41 AM This report was finalized on [id number] by  Nitin Garcia, .    Xr Abdomen Kub    Result Date: 6/2/2020  Impression:  1. Developing dilated small bowel loops, which may reflect postoperative ileus. Evolving partial small bowel obstruction not excluded. Continued clinical and radiographic follow-up is recommended.  Electronically Signed By-Dr. Denisse Marino MD On:6/2/2020 10:44 AM This report was finalized on [id number] by Dr. Denisse Marino MD.                             Test Results Pending at Discharge   Order Current Status    Body Fluid Culture - Body Fluid, Peritoneum Preliminary result            Procedures Performed  Procedure(s):  LAPAROTOMY  EXPLORATORY         Consults:   Consults     Date and Time Order Name Status Description    6/2/2020 1035 Inpatient Hospitalist Consult Completed     6/1/2020 1459 Inpatient Infectious Diseases Consult Completed             Discharge Details        Discharge Medications      New Medications      Instructions Start Date   sodium chloride 0.9 % solution 100 mL with piperacillin-tazobactam 4.5 (4-0.5) g reconstituted solution 4.5 g IVPB   4.5 g, Intravenous, Every 8 Hours         Stop These Medications    ciprofloxacin 500 MG tablet  Commonly known as:  CIPRO     metroNIDAZOLE 500 MG tablet  Commonly known as:  FLAGYL            No Known Allergies      Discharge Disposition:  Home or Self Care    Diet:  Hospital:  Diet Order   Procedures   • Diet Gastrointestinal; Low Residue         Discharge Activity:         CODE STATUS:    Code Status and Medical Interventions:   Ordered at: 05/30/20 2045     Code Status:    CPR     Medical Interventions (Level of Support Prior to Arrest):    Full         Follow-up Appointments  Future Appointments   Date Time Provider Department Center   6/12/2020  9:50 AM Scarlet Ruvalcaba MD [facility] None   6/15/2020  1:00 PM Dalia Schroeder MD [facility] None       Additional Instructions for the Follow-ups that You Need to Schedule     Ambulatory Referral to Home Health   As directed      [facility] H/H AND INFUSION TO EVAL AND TREAT    Order Comments:  [facility] H/H AND INFUSION TO EVAL AND TREAT     Face to Face Visit Date:  6/2/2020    Follow-up provider for Plan of Care?:  I treated the patient in an acute care facility and will not continue treatment after discharge.    Follow-up provider:  DALIA SCHROEDER [[id number]]    Reason/Clinical Findings:  sepsis , PERITONITIS    Describe mobility limitations that make leaving home difficult:  recent surgery    Nursing/Therapeutic Services Requested:  Other    Frequency:  1 Week 1         Discharge Follow-up with PCP   As directed        Currently Documented PCP:    Kaylene Schroeder MD    PCP Phone Number:    [phone]     Follow Up Details:  one week time.                 Condition on Discharge:      Stable        Electronically signed by Mary Rawls MD, 06/04/20, 2:34 PM.      Time: I spent  33 minutes on this discharge activity which included face-to-face encounter with the patient/reviewing the data in the system/coordination of the care with the nursing staff as well as consultants/documentation/entering orders.

## 2020-06-05 ENCOUNTER — TRANSITIONAL CARE MANAGEMENT TELEPHONE ENCOUNTER (OUTPATIENT)
Dept: CALL CENTER | Facility: HOSPITAL | Age: 40
End: 2020-06-05

## 2020-06-05 NOTE — PROGRESS NOTES
Case Management Discharge Note      Final Note: Home with [facility] and [facility] (cost of total infusion is $670.00). Patient is agreeable    Provided Post Acute Provider List?: Yes  Post Acute Provider List: Home Health  Delivered To: Support Person  Support Person: spouse Jimmy  Method of Delivery: Telephone          Dialysis/Infusion      Service Provider Request Status Selected Services Address Phone Number Fax Number    [facility] HOME INFUSION Selected Infusion and IV Therapy [address] [place] [id number] [phone]      Home Medical Care      Service Provider Request Status Selected Services Address Phone Number Fax Number    [facility] CARE [facility] Selected Home Health Services [address] [facility] [address] [phone]                Final Discharge Disposition Code: 06 - home with home health care

## 2020-06-05 NOTE — OUTREACH NOTE
Call Center TCM Note      Responses   [facility] patient discharged from?  [facility]   COVID-19 Test Status  Negative   Does the patient have one of the following disease processes/diagnoses(primary or secondary)?  Sepsis   TCM attempt successful?  Yes   Call start time  1115   Call end time  1126   Discharge diagnosis  Ruptured appendicitis , sepsis   Medication alerts for this patient  IV antibiotics   Meds reviewed with patient/caregiver?  Yes   Is the patient having any side effects they believe may be caused by any medication additions or changes?  No   Does the patient have all medications related to this admission filled (includes all antibiotics, inhalers, nebulizers,steroids,etc.)  Yes   Is the patient taking all medications as directed (includes completed medication regime)?  Yes   Does the patient have a primary care provider?   Yes   Comments regarding PCP  Kaylene Schroeder   Does the patient have an appointment with their PCP within 7 days of discharge?  Greater than 7 days [Pt has N/p appt on 06/15/2020]   Has the patient kept scheduled appointments due by today?  N/A   What is the Home health agency?   [facility]   Has home health visited the patient within 72 hours of discharge?  Yes   Home health comments  Pt was seen by [facility] today and IV antibiotic administered   Did the patient receive a copy of their discharge instructions?  Yes   Nursing interventions  Reviewed instructions with patient   What is the patient's perception of their health status since discharge?  Improving   Is the patient/caregiver able to teach back Sepsis?  S - Shivering,fever or very cold, E - Extreme pain or generalized discomfort (worst ever,especially abdomen), P - Pale or discolored skin, S - Sleepy, difficult to arouse,confused, I -   I feel like I might die-a feeling of hopelessness, S - Short of breath   Nursing interventions  Nurse provided reassurance to patient   Is patient/caregiver able to teach back steps to recovery  at home?  Set small, achievable goals for return to baseline health, Learn about sepsis(sepsis.org), Eat a balanced diet, Rest and regain strength, Exercise as tolerated, Make a list of questions for PCP appoinment, Record milestones and struggles in a journal, Talk about feelings with family/friends   Is the patient/caregiver able to teach back signs and symptoms of worsening condition:  Fever, Edema, High blood glucose without diabetes, Hyperthermia, Rapid heart rate (>90), Shortness of breath/rapid respiratory rate, Altered mental status(confusion/coma)   Is the patient/caregiver able to teach back the hierarchy of who to call/visit for symptoms/problems? PCP, Specialist, Home health nurse, Urgent Care, ED, 911  Yes   TCM call completed?  Yes   Wrap up additional comments  Pt is very sore but doing well. IV antibiotics started today in home by BARNEY Amos. Pt did not get home with Percocet rx. I spoke with HH RN Raegan has already spoken with  and this issue is being resolved.  also called the Nurse desk on [address] [facility] where pt was during hosital stay.          Yi Mcdermott MA    6/5/2020, 11:30

## 2020-06-06 LAB
BACTERIA FLD CULT: NORMAL
GRAM STN SPEC: NORMAL
GRAM STN SPEC: NORMAL

## 2020-06-08 ENCOUNTER — LAB REQUISITION (OUTPATIENT)
Dept: LAB | Facility: HOSPITAL | Age: 40
End: 2020-06-08

## 2020-06-08 DIAGNOSIS — K35.33 ACUTE APPENDICITIS WITH PERFORATION AND LOCALIZED PERITONITIS, WITH ABSCESS: ICD-10-CM

## 2020-06-08 LAB
BASOPHILS # BLD MANUAL: 0.13 10*3/MM3 (ref 0–0.2)
BASOPHILS NFR BLD AUTO: 1 % (ref 0–1.5)
CREAT BLD-MCNC: 0.58 MG/DL (ref 0.57–1)
DEPRECATED RDW RBC AUTO: 43.3 FL (ref 37–54)
EOSINOPHIL # BLD MANUAL: 0.25 10*3/MM3 (ref 0–0.4)
EOSINOPHIL NFR BLD MANUAL: 2 % (ref 0.3–6.2)
ERYTHROCYTE [DISTWIDTH] IN BLOOD BY AUTOMATED COUNT: 13.9 % (ref 12.3–15.4)
GFR SERPL CREATININE-BSD FRML MDRD: 116 ML/MIN/1.73
HCT VFR BLD AUTO: 29.4 % (ref 34–46.6)
HGB BLD-MCNC: 10 G/DL (ref 12–15.9)
LYMPHOCYTES # BLD MANUAL: 2.5 10*3/MM3 (ref 0.7–3.1)
LYMPHOCYTES NFR BLD MANUAL: 20 % (ref 19.6–45.3)
LYMPHOCYTES NFR BLD MANUAL: 4 % (ref 5–12)
MCH RBC QN AUTO: 30 PG (ref 26.6–33)
MCHC RBC AUTO-ENTMCNC: 34.1 G/DL (ref 31.5–35.7)
MCV RBC AUTO: 87.8 FL (ref 79–97)
METAMYELOCYTES NFR BLD MANUAL: 1 % (ref 0–0)
MONOCYTES # BLD AUTO: 0.5 10*3/MM3 (ref 0.1–0.9)
NEUTROPHILS # BLD AUTO: 9 10*3/MM3 (ref 1.7–7)
NEUTROPHILS NFR BLD MANUAL: 61 % (ref 42.7–76)
NEUTS BAND NFR BLD MANUAL: 11 % (ref 0–5)
PLATELET # BLD AUTO: 805 10*3/MM3 (ref 140–450)
PMV BLD AUTO: 7.5 FL (ref 6–12)
RBC # BLD AUTO: 3.34 10*6/MM3 (ref 3.77–5.28)
RBC MORPH BLD: NORMAL
SCAN SLIDE: NORMAL
SMALL PLATELETS BLD QL SMEAR: ABNORMAL
WBC MORPH BLD: NORMAL
WBC NRBC COR # BLD: 12.5 10*3/MM3 (ref 3.4–10.8)

## 2020-06-08 PROCEDURE — 85025 COMPLETE CBC W/AUTO DIFF WBC: CPT | Performed by: INTERNAL MEDICINE

## 2020-06-08 PROCEDURE — 82565 ASSAY OF CREATININE: CPT | Performed by: INTERNAL MEDICINE

## 2020-06-12 ENCOUNTER — READMISSION MANAGEMENT (OUTPATIENT)
Dept: CALL CENTER | Facility: HOSPITAL | Age: 40
End: 2020-06-12

## 2020-06-12 ENCOUNTER — OFFICE VISIT (OUTPATIENT)
Dept: SURGERY | Facility: CLINIC | Age: 40
End: 2020-06-12

## 2020-06-12 DIAGNOSIS — Z09 FOLLOW UP: Primary | ICD-10-CM

## 2020-06-12 PROCEDURE — 99024 POSTOP FOLLOW-UP VISIT: CPT | Performed by: SURGERY

## 2020-06-12 NOTE — OUTREACH NOTE
Sepsis Week 2 Survey      Responses   [facility] patient discharged from?  [facility]   COVID-19 Test Status  Negative   Does the patient have one of the following disease processes/diagnoses(primary or secondary)?  Sepsis   Week 2 attempt successful?  Yes   Call start time  1255   Call end time  1258   Is patient permission given to speak with other caregiver?  No   Medication alerts for this patient  IV antibiotics, PICC line   Meds reviewed with patient/caregiver?  Yes   Is the patient having any side effects they believe may be caused by any medication additions or changes?  No   Does the patient have all medications related to this admission filled (includes all antibiotics, inhalers, nebulizers,steroids,etc.)  Yes   Is the patient taking all medications as directed (includes completed medication regime)?  Yes   Does the patient have a primary care provider?   Yes   Comments regarding PCP  Kaylene Schroeder PCP, appt 6/15/20   Does the patient have an appointment with their PCP within 7 days of discharge?  Greater than 7 days   Nursing Interventions  Verified appointment date/time/provider   Has the patient kept scheduled appointments due by today?  Yes [patient reports that she got her staples removed today. ]   What is the Home health agency?   [facility]   Has home health visited the patient within 72 hours of discharge?  Yes   Pulse Ox monitoring  None   Psychosocial issues?  No   Did the patient receive a copy of their discharge instructions?  Yes   Nursing interventions  Reviewed instructions with patient   What is the patient's perception of their health status since discharge?  Improving   Nursing interventions  Nurse provided patient education   Is patient/caregiver able to teach back steps to recovery at home?  Set small, achievable goals for return to baseline health, Rest and regain strength   Is the patient/caregiver able to teach back signs and symptoms of worsening condition:  Fever   Is the  patient/caregiver able to teach back the hierarchy of who to call/visit for symptoms/problems? PCP, Specialist, Home health nurse, Urgent Care, ED, 911  Yes   Week 2 call completed?  Yes          Yi So RN

## 2020-06-12 NOTE — PROGRESS NOTES
Subjective   Suzie Duvall is a 39 y.o. female.   Patient is here for follow-up status post open appendectomy for perforated necrotic appendicitis yet    Objective   LMP 05/28/2020 (Exact Date)   Physical Exam   Well-healing incision    Assessment/Plan   Suzie was seen today for post-op.    Diagnoses and all orders for this visit:    Follow up      Status post open appendectomy for perforated acute appendicitis.  Patient still has another week of IV antibiotics and PICC line is in place.  Follow-up PRN.  Scarlet Ruvalcaba MD  6/12/2020  10:24 AM

## 2020-06-18 ENCOUNTER — READMISSION MANAGEMENT (OUTPATIENT)
Dept: CALL CENTER | Facility: HOSPITAL | Age: 40
End: 2020-06-18

## 2020-06-18 NOTE — OUTREACH NOTE
Sepsis Week 3 Survey      Responses   [facility] [place] [facility]  [facility]   COVID-19 Test Status  Negative   Does the patient have one of the following disease processes/diagnoses(primary or secondary)?  Sepsis   Week 3 attempt successful?  Yes   Call start time  1356   Call end time  1357   Discharge diagnosis  Ruptured appendicitis , sepsis   Is patient permission given to speak with other caregiver?  No   Meds reviewed with patient/caregiver?  Yes   Is the patient having any side effects they believe may be caused by any medication additions or changes?  No   Does the patient have all medications related to this admission filled (includes all antibiotics, inhalers, nebulizers,steroids,etc.)  Yes   Is the patient taking all medications as directed (includes completed medication regime)?  Yes   Does the patient have a primary care provider?   Yes   Has the patient kept scheduled appointments due by today?  Yes   Pulse Ox monitoring  None   Psychosocial issues?  No   Did the patient receive a copy of their discharge instructions?  Yes   Nursing interventions  Reviewed instructions with patient, Educated on MyChart   What is the patient's perception of their health status since discharge?  Improving   Nursing interventions  Nurse provided patient education   Is the patient/caregiver able to teach back Sepsis?  S - Shivering,fever or very cold, E - Extreme pain or generalized discomfort (worst ever,especially abdomen), P - Pale or discolored skin, S - Sleepy, difficult to arouse,confused, I -   I feel like I might die-a feeling of hopelessness, S - Short of breath   Nursing interventions  Nurse provided reassurance to patient, Nurse provided patient education   Is patient/caregiver able to teach back steps to recovery at home?  Set small, achievable goals for return to baseline health, Rest and regain strength   Is the patient/caregiver able to teach back signs and symptoms of worsening condition:  Fever,  Hyperthermia, Rapid heart rate (>90), Shortness of breath/rapid respiratory rate   Is the patient/caregiver able to teach back the hierarchy of who to call/visit for symptoms/problems? PCP, Specialist, Home health nurse, Urgent Care, ED, 911  Yes   Week 3 call completed?  Yes   Revoked  No further contact(revokes)-requires comment   Graduated/Revoked comments  She has returned to work.           Meredith Pickett RN

## 2023-04-17 ENCOUNTER — OFFICE VISIT (OUTPATIENT)
Dept: FAMILY MEDICINE CLINIC | Facility: CLINIC | Age: 43
End: 2023-04-17
Payer: MEDICARE

## 2023-04-17 VITALS
HEIGHT: 64 IN | OXYGEN SATURATION: 98 % | DIASTOLIC BLOOD PRESSURE: 89 MMHG | SYSTOLIC BLOOD PRESSURE: 129 MMHG | BODY MASS INDEX: 32.17 KG/M2 | HEART RATE: 87 BPM | WEIGHT: 188.4 LBS | TEMPERATURE: 98.1 F

## 2023-04-17 DIAGNOSIS — K21.9 GASTROESOPHAGEAL REFLUX DISEASE, UNSPECIFIED WHETHER ESOPHAGITIS PRESENT: ICD-10-CM

## 2023-04-17 DIAGNOSIS — Z13.1 SCREENING FOR DIABETES MELLITUS: ICD-10-CM

## 2023-04-17 DIAGNOSIS — Z11.59 NEED FOR HEPATITIS C SCREENING TEST: ICD-10-CM

## 2023-04-17 DIAGNOSIS — Z13.220 SCREENING FOR CHOLESTEROL LEVEL: ICD-10-CM

## 2023-04-17 DIAGNOSIS — F41.8 ANXIETY ASSOCIATED WITH DEPRESSION: ICD-10-CM

## 2023-04-17 DIAGNOSIS — R73.9 ACUTE HYPERGLYCEMIA: ICD-10-CM

## 2023-04-17 DIAGNOSIS — M54.50 CHRONIC MIDLINE LOW BACK PAIN WITHOUT SCIATICA: Primary | ICD-10-CM

## 2023-04-17 DIAGNOSIS — Z13.29 SCREENING FOR THYROID DISORDER: ICD-10-CM

## 2023-04-17 DIAGNOSIS — G89.29 CHRONIC MIDLINE LOW BACK PAIN WITHOUT SCIATICA: Primary | ICD-10-CM

## 2023-04-17 DIAGNOSIS — D64.9 NORMOCYTIC ANEMIA: ICD-10-CM

## 2023-04-17 DIAGNOSIS — E55.9 VITAMIN D DEFICIENCY: ICD-10-CM

## 2023-04-17 DIAGNOSIS — K58.2 IRRITABLE BOWEL SYNDROME WITH BOTH CONSTIPATION AND DIARRHEA: ICD-10-CM

## 2023-04-17 PROBLEM — Z90.49 HISTORY OF APPENDECTOMY: Status: ACTIVE | Noted: 2023-04-17

## 2023-04-17 PROBLEM — D72.829 LEUKOCYTOSIS: Status: RESOLVED | Noted: 2020-06-02 | Resolved: 2023-04-17

## 2023-04-17 PROBLEM — N30.00 ACUTE CYSTITIS WITHOUT HEMATURIA: Status: RESOLVED | Noted: 2020-05-30 | Resolved: 2023-04-17

## 2023-04-17 PROBLEM — K65.0 ACUTE PERITONITIS: Status: RESOLVED | Noted: 2020-05-30 | Resolved: 2023-04-17

## 2023-04-17 PROBLEM — Z09 ENCOUNTER FOR FOLLOW-UP: Status: RESOLVED | Noted: 2020-06-12 | Resolved: 2023-04-17

## 2023-04-17 PROBLEM — R11.2 NAUSEA AND VOMITING: Status: RESOLVED | Noted: 2020-06-02 | Resolved: 2023-04-17

## 2023-04-17 RX ORDER — SERTRALINE HYDROCHLORIDE 25 MG/1
TABLET, FILM COATED ORAL
Qty: 30 TABLET | Refills: 3 | Status: SHIPPED | OUTPATIENT
Start: 2023-04-17

## 2023-04-17 RX ORDER — PANTOPRAZOLE SODIUM 20 MG/1
20 TABLET, DELAYED RELEASE ORAL DAILY
Qty: 30 TABLET | Refills: 5 | Status: SHIPPED | OUTPATIENT
Start: 2023-04-17

## 2023-04-17 NOTE — PROGRESS NOTES
"Subjective   Szuie Duvall is a 42 y.o. female.   Chief Complaint   Patient presents with   • Heartburn   • Abdominal Pain       History of Present Illness   42-year-old female presents to the office today as a new patient.  Accompanied by her .  Tells me she has not had a doctor in years.  She had to have her appendix out in May 2020.  Nothing since then.  She has multiple complaints.    Low back pain- chronic.  There most days.  Associated with stiffness in her knees when she stands up and walks.  She does not do any exercise.    Belly pain- she tells me sometimes after she eats she will get cramping in her abdomen the last 10 to 15 minutes.  Usually goes away with several Tums.  Oftentimes associated with loose stool.  Other times she has constipation.    She tells me that she oftentimes burps \"hot water\" and it burns to the back of her throat.  Tums helps that a little bit too.    Anxiety and depression-she has never been diagnosed with either of these.  3 years ago she was having chest pain-went to [facility].  She was told it was anxiety.  She is done nothing about this since that time.  She tells me she will have spells when she just suddenly feels like she cannot catch her breath and something bad is going to happen.  It goes away on its own.  She tells me she thinks she is depressed because she feels bad all the time.  Sometimes she cries and cannot pinpoint why.  She tells me she does not think things will ever get any better.  She has no plan for the future.  She does not work.  She subsists on survivor benefits after her father .  She is on Medicare at 42-she must have some disabling condition.    She has been  for 7 years.  Her  himself is also young but apparently has end-stage heart failure.    She does not smoke.      Patient Active Problem List    Diagnosis Date Noted   • History of appendectomy 2023     Note Last Updated: 2023     May 2020     • " "Obesity (BMI 30-39.9) 06/02/2020   • Normocytic anemia 05/30/2020   • Acute hyperglycemia 05/30/2020           Past Surgical History:   Procedure Laterality Date   • EXPLORATORY LAPAROTOMY N/A 5/30/2020    Procedure: LAPAROTOMY EXPLORATORY;  Surgeon: Scarlet Ruvalcaba MD;  Location: [facility] MAIN OR;  Service: General;  Laterality: N/A;     Current Outpatient Medications on File Prior to Visit   Medication Sig   • [DISCONTINUED] oxyCODONE-acetaminophen (Percocet) 5-325 MG per tablet Take 1 tablet by mouth Every 6 (Six) Hours As Needed (pain).     No current facility-administered medications on file prior to visit.     No Known Allergies  Social History     Socioeconomic History   • Marital status:    Tobacco Use   • Smoking status: Never   • Smokeless tobacco: Never   Substance and Sexual Activity   • Alcohol use: Never   • Drug use: Defer   • Sexual activity: Defer     Family History   Problem Relation Age of Onset   • Heart failure Mother    • Diabetes Father        Review of Systems    Objective   /89 (BP Location: Right arm, Patient Position: Sitting, Cuff Size: Adult)   Pulse 87   Temp 98.1 °F (36.7 °C) (Infrared)   Ht 162.6 cm (64\")   Wt 85.5 kg (188 lb 6.4 oz)   SpO2 98%   BMI 32.34 kg/m²   Physical Exam  Constitutional:       Appearance: She is well-developed. She is not toxic-appearing.      Comments: A little disheveled     HENT:      Head: Normocephalic and atraumatic.   Eyes:      Conjunctiva/sclera: Conjunctivae normal.   Cardiovascular:      Rate and Rhythm: Normal rate and regular rhythm.      Heart sounds: No murmur heard.  Pulmonary:      Effort: Pulmonary effort is normal. No respiratory distress.      Breath sounds: Normal breath sounds. No wheezing or rales.   Musculoskeletal:         General: Normal range of motion.      Cervical back: Normal range of motion.      Right lower leg: No edema.      Left lower leg: No edema.   Skin:     General: Skin is warm and dry.      Findings: " "No rash.   Neurological:      Mental Status: She is alert and oriented to person, place, and time.      Gait: Gait normal.   Psychiatric:         Mood and Affect: Mood normal.         Behavior: Behavior normal.         Cognition and Memory: Cognition is impaired.         Judgment: Judgment is impulsive.      Comments: Smiles a lot during our visit.  Laughs a lot, sometimes seemingly unprovoked.                 Assessment & Plan   Diagnoses and all orders for this visit:    1. Chronic midline low back pain without sciatica (Primary)  -     XR Spine Lumbar 2 or 3 View (In Office)    2. Normocytic anemia  -     CBC & Differential    3. Acute hyperglycemia  -     Hemoglobin A1c  -     Comprehensive Metabolic Panel    4. Anxiety associated with depression  -     sertraline (Zoloft) 25 MG tablet; Take 1/2 tablet/day for 6 days then increase to 1 full tablet daily  Dispense: 30 tablet; Refill: 3    5. Gastroesophageal reflux disease, unspecified whether esophagitis present  -     pantoprazole (Protonix) 20 MG EC tablet; Take 1 tablet by mouth Daily.  Dispense: 30 tablet; Refill: 5    6. Irritable bowel syndrome with both constipation and diarrhea    7. Need for hepatitis C screening test  -     Hepatitis C Antibody    8. Screening for cholesterol level    9. Screening for diabetes mellitus  -     Comprehensive Metabolic Panel    10. Screening for thyroid disorder  -     TSH    11. Vitamin D deficiency  -     Vitamin D,25-Hydroxy    I reviewed the information that was available in epic regarding her episode and 2020 when she was hospitalized for an appendectomy.  She really has no other significant medical history.  She has had a Pap smear \"sometime\".  We will do an x-ray of her low back just to see if she has knee arthritis.  Combining the description of her low back pain with general joint stiffness-I suspect she has arthritis.  I advised her to start going for a walk every day.  I explained that if she does not try to " keep herself physically fit, she will have joint and back pains.  Okay to take Tylenol as needed.  I will get some labs as above.  We will screen her for diabetes, anemia, kidney or liver disease, thyroid disorder, vitamin deficiency.  I will follow-up with her when those tests are back.    She has some symptoms suggestive of panic attacks, some anxiety and may be a little depression.  Her symptom reporting may be colored by what appears to be some mild cognitive deficits.  We will start her on Zoloft and titrate to 25 mg/day.  I also talked to her about looking to the future and making some plans and having some goals of what she wants to accomplish.  That can help give days some purpose.    It sounds like she is describing reflux.  I am going to start her on pantoprazole.  We will see her back in a few weeks.  We will have results of the x-rays and labs and be able to see how she has responded to the Zoloft and pantoprazole and then proceed accordingly.        Call with any problems or concerns before next visit       Return in about 4 weeks (around 5/15/2023).      Much of this report is an electronic transcription of spoken language to printed text using Dragon dictation software.  As such, the subtleties and finesse of spoken language may permit erroneous, or at times, nonsensical words or phrases to be inadvertently transcribed; thus changes may be made at a later date to rectify these errors.     Kaylene Schroeder MD4/17/202310:23 EDT  This note has been electronically signed

## 2023-04-18 ENCOUNTER — TELEPHONE (OUTPATIENT)
Dept: FAMILY MEDICINE CLINIC | Facility: CLINIC | Age: 43
End: 2023-04-18
Payer: MEDICARE

## 2023-04-18 DIAGNOSIS — G89.29 CHRONIC MIDLINE LOW BACK PAIN WITHOUT SCIATICA: ICD-10-CM

## 2023-04-18 DIAGNOSIS — F41.8 ANXIETY ASSOCIATED WITH DEPRESSION: Primary | ICD-10-CM

## 2023-04-18 DIAGNOSIS — M54.50 CHRONIC MIDLINE LOW BACK PAIN WITHOUT SCIATICA: ICD-10-CM

## 2023-04-18 LAB
25(OH)D3+25(OH)D2 SERPL-MCNC: 17.7 NG/ML (ref 30–100)
ALBUMIN SERPL-MCNC: 4.6 G/DL (ref 3.8–4.8)
ALBUMIN/GLOB SERPL: 1.6 {RATIO} (ref 1.2–2.2)
ALP SERPL-CCNC: 77 IU/L (ref 44–121)
ALT SERPL-CCNC: 20 IU/L (ref 0–32)
AST SERPL-CCNC: 18 IU/L (ref 0–40)
BASOPHILS # BLD AUTO: 0.1 X10E3/UL (ref 0–0.2)
BASOPHILS NFR BLD AUTO: 1 %
BILIRUB SERPL-MCNC: 0.4 MG/DL (ref 0–1.2)
BUN SERPL-MCNC: 12 MG/DL (ref 6–24)
BUN/CREAT SERPL: 19 (ref 9–23)
CALCIUM SERPL-MCNC: 9.4 MG/DL (ref 8.7–10.2)
CHLORIDE SERPL-SCNC: 107 MMOL/L (ref 96–106)
CO2 SERPL-SCNC: 21 MMOL/L (ref 20–29)
CREAT SERPL-MCNC: 0.62 MG/DL (ref 0.57–1)
EGFRCR SERPLBLD CKD-EPI 2021: 114 ML/MIN/1.73
EOSINOPHIL # BLD AUTO: 0.1 X10E3/UL (ref 0–0.4)
EOSINOPHIL NFR BLD AUTO: 2 %
ERYTHROCYTE [DISTWIDTH] IN BLOOD BY AUTOMATED COUNT: 12.7 % (ref 11.7–15.4)
GLOBULIN SER CALC-MCNC: 2.9 G/DL (ref 1.5–4.5)
GLUCOSE SERPL-MCNC: 83 MG/DL (ref 70–99)
HBA1C MFR BLD: 5.9 % (ref 4.8–5.6)
HCT VFR BLD AUTO: 43.1 % (ref 34–46.6)
HCV IGG SERPL QL IA: NON REACTIVE
HGB BLD-MCNC: 14.4 G/DL (ref 11.1–15.9)
IMM GRANULOCYTES # BLD AUTO: 0 X10E3/UL (ref 0–0.1)
IMM GRANULOCYTES NFR BLD AUTO: 0 %
LYMPHOCYTES # BLD AUTO: 2.8 X10E3/UL (ref 0.7–3.1)
LYMPHOCYTES NFR BLD AUTO: 37 %
MCH RBC QN AUTO: 29 PG (ref 26.6–33)
MCHC RBC AUTO-ENTMCNC: 33.4 G/DL (ref 31.5–35.7)
MCV RBC AUTO: 87 FL (ref 79–97)
MONOCYTES # BLD AUTO: 0.6 X10E3/UL (ref 0.1–0.9)
MONOCYTES NFR BLD AUTO: 8 %
NEUTROPHILS # BLD AUTO: 3.9 X10E3/UL (ref 1.4–7)
NEUTROPHILS NFR BLD AUTO: 52 %
PLATELET # BLD AUTO: 446 X10E3/UL (ref 150–450)
POTASSIUM SERPL-SCNC: 4.7 MMOL/L (ref 3.5–5.2)
PROT SERPL-MCNC: 7.5 G/DL (ref 6–8.5)
RBC # BLD AUTO: 4.96 X10E6/UL (ref 3.77–5.28)
SODIUM SERPL-SCNC: 140 MMOL/L (ref 134–144)
TSH SERPL DL<=0.005 MIU/L-ACNC: 1.08 UIU/ML (ref 0.45–4.5)
WBC # BLD AUTO: 7.4 X10E3/UL (ref 3.4–10.8)

## 2023-04-18 NOTE — TELEPHONE ENCOUNTER
HUB TO READ     LEFT MESSAGE TO RETURN CALL     Please tell Suzie tomorrow that the x-rays of her back showed something we call spondylolisthesis.  It means one of the bones is not stacked right on top of the bone below it, but that it is shifted forward just a little bit.   Usually we can get back pain related to this under control with some physical therapy.  If she is willing to do this, please let me know and I will place a consult to [facility] physical therapy department.  If she has any questions, please let me know.  Thanks!

## 2023-04-19 ENCOUNTER — TELEPHONE (OUTPATIENT)
Dept: FAMILY MEDICINE CLINIC | Facility: CLINIC | Age: 43
End: 2023-04-19
Payer: MEDICARE

## 2023-04-19 DIAGNOSIS — E55.9 VITAMIN D DEFICIENCY: Primary | ICD-10-CM

## 2023-04-19 PROBLEM — R73.03 PREDIABETES: Status: ACTIVE | Noted: 2023-04-19

## 2023-04-19 PROBLEM — R73.9 ACUTE HYPERGLYCEMIA: Status: RESOLVED | Noted: 2020-05-30 | Resolved: 2023-04-19

## 2023-04-19 RX ORDER — MELATONIN
2000 DAILY
Qty: 180 TABLET | Refills: 3 | Status: SHIPPED | OUTPATIENT
Start: 2023-04-19

## 2023-04-19 NOTE — TELEPHONE ENCOUNTER
HUB TO READ     LEFT MESSAGE TO RETURN CALL     tell her that her blood work from Monday all looked pretty good.  Her vitamin D is low at 17.  I would like to start her on a vitamin D supplement.  Its 2 pills/day.  Have sent a prescription for this to the pharmacy.   Her thyroid function was normal.  Kidney function is normal, liver function is normal.  She is not anemic.  Her blood sugar was normal but she does have prediabetes.  That just means she is at risk of developing diabetes in the future and we want to keep a close eye on her blood sugars going forward.  If she has any questions, please let me know.

## 2023-08-03 ENCOUNTER — OFFICE VISIT (OUTPATIENT)
Dept: FAMILY MEDICINE CLINIC | Facility: CLINIC | Age: 43
End: 2023-08-03
Payer: MEDICARE

## 2023-08-03 VITALS
DIASTOLIC BLOOD PRESSURE: 84 MMHG | OXYGEN SATURATION: 98 % | BODY MASS INDEX: 32.32 KG/M2 | TEMPERATURE: 94.8 F | HEIGHT: 64 IN | SYSTOLIC BLOOD PRESSURE: 121 MMHG | HEART RATE: 76 BPM

## 2023-08-03 DIAGNOSIS — E55.9 VITAMIN D DEFICIENCY: ICD-10-CM

## 2023-08-03 DIAGNOSIS — K21.9 GASTROESOPHAGEAL REFLUX DISEASE, UNSPECIFIED WHETHER ESOPHAGITIS PRESENT: ICD-10-CM

## 2023-08-03 DIAGNOSIS — F41.8 ANXIETY ASSOCIATED WITH DEPRESSION: ICD-10-CM

## 2023-08-03 PROCEDURE — 1159F MED LIST DOCD IN RCRD: CPT | Performed by: NURSE PRACTITIONER

## 2023-08-03 PROCEDURE — 99214 OFFICE O/P EST MOD 30 MIN: CPT | Performed by: NURSE PRACTITIONER

## 2023-08-03 PROCEDURE — 1160F RVW MEDS BY RX/DR IN RCRD: CPT | Performed by: NURSE PRACTITIONER

## 2023-08-03 RX ORDER — SERTRALINE HYDROCHLORIDE 25 MG/1
TABLET, FILM COATED ORAL
Qty: 30 TABLET | Refills: 3 | Status: SHIPPED | OUTPATIENT
Start: 2023-08-03

## 2023-08-03 RX ORDER — ERGOCALCIFEROL 1.25 MG/1
50000 CAPSULE ORAL WEEKLY
Qty: 5 CAPSULE | Refills: 3 | Status: SHIPPED | OUTPATIENT
Start: 2023-08-03

## 2023-08-03 RX ORDER — PANTOPRAZOLE SODIUM 20 MG/1
20 TABLET, DELAYED RELEASE ORAL DAILY
Qty: 30 TABLET | Refills: 5 | Status: SHIPPED | OUTPATIENT
Start: 2023-08-03

## 2023-09-18 ENCOUNTER — OFFICE VISIT (OUTPATIENT)
Dept: FAMILY MEDICINE CLINIC | Facility: CLINIC | Age: 43
End: 2023-09-18
Payer: MEDICARE

## 2023-09-18 VITALS
SYSTOLIC BLOOD PRESSURE: 138 MMHG | DIASTOLIC BLOOD PRESSURE: 89 MMHG | HEIGHT: 64 IN | WEIGHT: 193 LBS | OXYGEN SATURATION: 96 % | RESPIRATION RATE: 18 BRPM | BODY MASS INDEX: 32.95 KG/M2 | HEART RATE: 100 BPM

## 2023-09-18 DIAGNOSIS — F41.8 ANXIETY ASSOCIATED WITH DEPRESSION: ICD-10-CM

## 2023-09-18 DIAGNOSIS — F43.21 GRIEF REACTION: Primary | ICD-10-CM

## 2023-09-18 PROCEDURE — 99214 OFFICE O/P EST MOD 30 MIN: CPT | Performed by: FAMILY MEDICINE

## 2023-09-18 PROCEDURE — 1159F MED LIST DOCD IN RCRD: CPT | Performed by: FAMILY MEDICINE

## 2023-09-18 PROCEDURE — 1160F RVW MEDS BY RX/DR IN RCRD: CPT | Performed by: FAMILY MEDICINE

## 2023-09-18 RX ORDER — SERTRALINE HYDROCHLORIDE 25 MG/1
25 TABLET, FILM COATED ORAL DAILY
Qty: 30 TABLET | Refills: 3 | Status: SHIPPED | OUTPATIENT
Start: 2023-09-18 | End: 2023-09-18 | Stop reason: DRUGHIGH

## 2023-09-18 NOTE — PROGRESS NOTES
Subjective   Suzie Duvall is a 43 y.o. female.   Chief Complaint   Patient presents with    Anxiety    Depression       History of Present Illness   Presents to the office today for the first time since April.  Appointment was made about a month and a half ago.  She tells me she is here for anxiety and depression.  She also tells me that her   yesterday.  He was in his early 50s.  He had heart failure.  She tells me she thought he was getting better but then he passed away at home.  She has been on Zoloft 25 mg/day.  Tolerating that pretty well.  Not sleeping well at all.          Patient Active Problem List    Diagnosis Date Noted    Anxiety associated with depression 2023    Vitamin D deficiency 2023    Prediabetes 2023     Note Last Updated: 2023     a1c - 5.9% -       History of appendectomy 2023     Note Last Updated: 2023     May 2020      Obesity (BMI 30-39.9) 2020    Normocytic anemia 2020           Past Surgical History:   Procedure Laterality Date    EXPLORATORY LAPAROTOMY N/A 2020    Procedure: LAPAROTOMY EXPLORATORY;  Surgeon: Scarlet Ruvalcaba MD;  Location: [facility] MAIN OR;  Service: General;  Laterality: N/A;     Current Outpatient Medications on File Prior to Visit   Medication Sig    pantoprazole (Protonix) 20 MG EC tablet Take 1 tablet by mouth Daily.    vitamin D (ERGOCALCIFEROL) 1.25 MG (62072 UT) capsule capsule Take 1 capsule by mouth 1 (One) Time Per Week.    [DISCONTINUED] sertraline (Zoloft) 25 MG tablet Take 1/2 tablet/day for 6 days then increase to 1 full tablet daily     No current facility-administered medications on file prior to visit.     No Known Allergies  Social History     Socioeconomic History    Marital status:    Tobacco Use    Smoking status: Never     Passive exposure: Never    Smokeless tobacco: Never   Vaping Use    Vaping Use: Never used   Substance and Sexual Activity    Alcohol use: Never  "   Drug use: Defer    Sexual activity: Yes     Partners: Male     Family History   Problem Relation Age of Onset    Heart failure Mother     Diabetes Father        Review of Systems    Objective   /89 (BP Location: Left arm, Patient Position: Sitting, Cuff Size: Adult)   Pulse 100   Resp 18   Ht 162.6 cm (64\")   Wt 87.5 kg (193 lb)   LMP 09/04/2023 (Approximate)   SpO2 96%   Breastfeeding No   BMI 33.13 kg/m²   Physical Exam  Constitutional:       Appearance: She is well-developed.      Comments:      HENT:      Head: Normocephalic and atraumatic.   Eyes:      Conjunctiva/sclera: Conjunctivae normal.   Cardiovascular:      Rate and Rhythm: Normal rate.   Pulmonary:      Effort: Pulmonary effort is normal.   Musculoskeletal:         General: Normal range of motion.      Cervical back: Normal range of motion.   Skin:     General: Skin is warm and dry.      Findings: No rash.   Neurological:      Mental Status: She is alert and oriented to person, place, and time.   Psychiatric:         Mood and Affect: Mood is depressed. Affect is tearful.         Behavior: Behavior normal.         No visits with results within 4 Month(s) from this visit.   Latest known visit with results is:   Office Visit on 04/17/2023   Component Date Value Ref Range Status    Hemoglobin A1C 04/17/2023 5.9 (H)  4.8 - 5.6 % Final    Comment:          Prediabetes: 5.7 - 6.4           Diabetes: >6.4           Glycemic control for adults with diabetes: <7.0      WBC 04/17/2023 7.4  3.4 - 10.8 x10E3/uL Final    RBC 04/17/2023 4.96  3.77 - 5.28 x10E6/uL Final    Hemoglobin 04/17/2023 14.4  11.1 - 15.9 g/dL Final    Hematocrit 04/17/2023 43.1  34.0 - 46.6 % Final    MCV 04/17/2023 87  79 - 97 fL Final    MCH 04/17/2023 29.0  26.6 - 33.0 pg Final    MCHC 04/17/2023 33.4  31.5 - 35.7 g/dL Final    RDW 04/17/2023 12.7  11.7 - 15.4 % Final    Platelets 04/17/2023 446  150 - 450 x10E3/uL Final    Neutrophil Rel % 04/17/2023 52  Not Estab. % Final "    Lymphocyte Rel % 04/17/2023 37  Not Estab. % Final    Monocyte Rel % 04/17/2023 8  Not Estab. % Final    Eosinophil Rel % 04/17/2023 2  Not Estab. % Final    Basophil Rel % 04/17/2023 1  Not Estab. % Final    Neutrophils Absolute 04/17/2023 3.9  1.4 - 7.0 x10E3/uL Final    Lymphocytes Absolute 04/17/2023 2.8  0.7 - 3.1 x10E3/uL Final    Monocytes Absolute 04/17/2023 0.6  0.1 - 0.9 x10E3/uL Final    Eosinophils Absolute 04/17/2023 0.1  0.0 - 0.4 x10E3/uL Final    Basophils Absolute 04/17/2023 0.1  0.0 - 0.2 x10E3/uL Final    Immature Granulocyte Rel % 04/17/2023 0  Not Estab. % Final    Immature Grans Absolute 04/17/2023 0.0  0.0 - 0.1 x10E3/uL Final    Glucose 04/17/2023 83  70 - 99 mg/dL Final    BUN 04/17/2023 12  6 - 24 mg/dL Final    Creatinine 04/17/2023 0.62  0.57 - 1.00 mg/dL Final    EGFR Result 04/17/2023 114  >59 mL/min/1.73 Final    BUN/Creatinine Ratio 04/17/2023 19  9 - 23 Final    Sodium 04/17/2023 140  134 - 144 mmol/L Final    Potassium 04/17/2023 4.7  3.5 - 5.2 mmol/L Final    Chloride 04/17/2023 107 (H)  96 - 106 mmol/L Final    Total CO2 04/17/2023 21  20 - 29 mmol/L Final    Calcium 04/17/2023 9.4  8.7 - 10.2 mg/dL Final    Total Protein 04/17/2023 7.5  6.0 - 8.5 g/dL Final    Albumin 04/17/2023 4.6  3.8 - 4.8 g/dL Final    Globulin 04/17/2023 2.9  1.5 - 4.5 g/dL Final    A/G Ratio 04/17/2023 1.6  1.2 - 2.2 Final    Total Bilirubin 04/17/2023 0.4  0.0 - 1.2 mg/dL Final    Alkaline Phosphatase 04/17/2023 77  44 - 121 IU/L Final    AST (SGOT) 04/17/2023 18  0 - 40 IU/L Final    ALT (SGPT) 04/17/2023 20  0 - 32 IU/L Final    TSH 04/17/2023 1.080  0.450 - 4.500 uIU/mL Final    Hep C Virus Ab 04/17/2023 Non Reactive  Non Reactive Final    Comment: HCV antibody alone does not differentiate between previously  resolved infection and active infection. Equivocal and Reactive  HCV antibody results should be followed up with an HCV RNA test  to support the diagnosis of active HCV infection.      25  Hydroxy, Vitamin D 04/17/2023 17.7 (L)  30.0 - 100.0 ng/mL Final    Comment: Vitamin D deficiency has been defined by the Muse of  Medicine and an Endocrine Society practice guideline as a  level of serum 25-OH vitamin D less than 20 ng/mL (1,2).  The Endocrine Society went on to further define vitamin D  insufficiency as a level between 21 and 29 ng/mL (2).  1. IOM (Muse of Medicine). 2010. Dietary reference     intakes for calcium and D. [place]: The     National [employer] Press.  2. Roberta MF, Gautam RUSSELL, Kamran GARDINER, et al.     Evaluation, treatment, and prevention of vitamin D     deficiency: an Endocrine Society clinical practice     guideline. JCEM. 2011 Jul; 96(7):1911-30.           Assessment & Plan   Diagnoses and all orders for this visit:    1. Grief reaction (Primary)    2. Anxiety associated with depression  -     Discontinue: sertraline (Zoloft) 25 MG tablet; Take 1 tablet by mouth Daily.  Dispense: 30 tablet; Refill: 3  -     sertraline (Zoloft) 50 MG tablet; Take 1 tablet by mouth Daily.  Dispense: 30 tablet; Refill: 2    She has had chronic anxiety and depression compounded now by the death of her  which was not unexpected, but not expected when it happened, either.  We are going to increase her Zoloft to 50 mg/day.  Overall, I spent 30 minutes on the day of service with Suzie discussing grief, how it differs from depression and anxiety.  I advised her to keep her contacts with friends, neighbors.  She will grieve at her own pace.  I will see her back here in about 3 weeks to reevaluate her.        Call with any problems or concerns before next visit       Return in about 3 weeks (around 10/9/2023).      Much of this report is an electronic transcription of spoken language to printed text using Dragon dictation software.  As such, the subtleties and finesse of spoken language may permit erroneous, or at times, nonsensical words or phrases to be inadvertently  transcribed; thus changes may be made at a later date to rectify these errors.     Kaylene Schroeder MD9/18/202317:42 EDT  This note has been electronically signed

## 2023-10-16 ENCOUNTER — OFFICE VISIT (OUTPATIENT)
Dept: FAMILY MEDICINE CLINIC | Facility: CLINIC | Age: 43
End: 2023-10-16
Payer: MEDICARE

## 2023-10-16 VITALS
HEART RATE: 94 BPM | TEMPERATURE: 96.9 F | WEIGHT: 188.6 LBS | OXYGEN SATURATION: 97 % | RESPIRATION RATE: 18 BRPM | BODY MASS INDEX: 32.2 KG/M2 | SYSTOLIC BLOOD PRESSURE: 152 MMHG | DIASTOLIC BLOOD PRESSURE: 93 MMHG | HEIGHT: 64 IN

## 2023-10-16 DIAGNOSIS — F41.8 ANXIETY ASSOCIATED WITH DEPRESSION: ICD-10-CM

## 2023-10-16 DIAGNOSIS — Z23 NEED FOR INFLUENZA VACCINATION: ICD-10-CM

## 2023-10-16 DIAGNOSIS — D64.9 NORMOCYTIC ANEMIA: ICD-10-CM

## 2023-10-16 DIAGNOSIS — E55.9 VITAMIN D DEFICIENCY: Primary | ICD-10-CM

## 2023-10-16 DIAGNOSIS — R73.9 ACUTE HYPERGLYCEMIA: ICD-10-CM

## 2023-10-16 DIAGNOSIS — F43.21 GRIEF REACTION: ICD-10-CM

## 2023-10-16 DIAGNOSIS — Z13.1 SCREENING FOR DIABETES MELLITUS: ICD-10-CM

## 2023-10-16 DIAGNOSIS — R03.0 ELEVATED BLOOD PRESSURE READING WITHOUT DIAGNOSIS OF HYPERTENSION: ICD-10-CM

## 2023-10-16 PROCEDURE — G0008 ADMIN INFLUENZA VIRUS VAC: HCPCS | Performed by: FAMILY MEDICINE

## 2023-10-16 PROCEDURE — 1160F RVW MEDS BY RX/DR IN RCRD: CPT | Performed by: FAMILY MEDICINE

## 2023-10-16 PROCEDURE — 99214 OFFICE O/P EST MOD 30 MIN: CPT | Performed by: FAMILY MEDICINE

## 2023-10-16 PROCEDURE — 1159F MED LIST DOCD IN RCRD: CPT | Performed by: FAMILY MEDICINE

## 2023-10-16 PROCEDURE — 90686 IIV4 VACC NO PRSV 0.5 ML IM: CPT | Performed by: FAMILY MEDICINE

## 2023-10-16 NOTE — PROGRESS NOTES
Subjective   Suzie Duvall is a 43 y.o. female.   Chief Complaint   Patient presents with    Anxiety    Depression    grief       History of Present Illness   Presents to the office today for follow-up primarily on anxiety, depression and grief reaction.  Her  passed away after a fight with heart failure.  I started her on Zoloft.  That has been helpful.  She is able to sleep okay at night.  She does not work.  She does not have much of a schedule so she will nap sometimes during the day.  Feels she gets enough sleep.  Has not had a significant appetite change.  She is accompanied by a friend today and is able to be alone when she needs to, but she is also able to have friends around.  She had blood work done back in April.  Was borderline for diabetes.  Vitamin D level was low.  She has no complaints today otherwise.      Patient Active Problem List    Diagnosis Date Noted    Anxiety associated with depression 08/03/2023    Vitamin D deficiency 04/19/2023    Prediabetes 04/19/2023     Note Last Updated: 4/19/2023     a1c - 5.9% - April, 2023      History of appendectomy 04/17/2023     Note Last Updated: 4/17/2023     May 2020      Obesity (BMI 30-39.9) 06/02/2020    Normocytic anemia 05/30/2020           Past Surgical History:   Procedure Laterality Date    EXPLORATORY LAPAROTOMY N/A 5/30/2020    Procedure: LAPAROTOMY EXPLORATORY;  Surgeon: Scarlet Ruvalcaba MD;  Location: [facility] MAIN OR;  Service: General;  Laterality: N/A;     Current Outpatient Medications on File Prior to Visit   Medication Sig    pantoprazole (Protonix) 20 MG EC tablet Take 1 tablet by mouth Daily.    sertraline (Zoloft) 50 MG tablet Take 1 tablet by mouth Daily.    vitamin D (ERGOCALCIFEROL) 1.25 MG (83525 UT) capsule capsule Take 1 capsule by mouth 1 (One) Time Per Week.     No current facility-administered medications on file prior to visit.     No Known Allergies  Social History     Socioeconomic History    Marital status:   "  Tobacco Use    Smoking status: Never     Passive exposure: Never    Smokeless tobacco: Never   Vaping Use    Vaping Use: Never used   Substance and Sexual Activity    Alcohol use: Never    Drug use: Defer    Sexual activity: Yes     Partners: Male     Family History   Problem Relation Age of Onset    Heart failure Mother     Diabetes Father        Review of Systems    Objective   /93 (BP Location: Right arm, Patient Position: Sitting, Cuff Size: Adult)   Pulse 94   Temp 96.9 °F (36.1 °C) (Infrared)   Resp 18   Ht 162.6 cm (64\")   Wt 85.5 kg (188 lb 9.6 oz)   LMP 10/04/2023 (Approximate)   SpO2 97%   Breastfeeding No   BMI 32.37 kg/m²   Physical Exam  Constitutional:       Appearance: She is well-developed.      Comments:      HENT:      Head: Normocephalic and atraumatic.   Eyes:      Conjunctiva/sclera: Conjunctivae normal.   Cardiovascular:      Rate and Rhythm: Normal rate and regular rhythm.      Heart sounds: No murmur heard.  Pulmonary:      Effort: Pulmonary effort is normal. No respiratory distress.      Breath sounds: Normal breath sounds.   Musculoskeletal:         General: Normal range of motion.      Cervical back: Normal range of motion.      Right lower leg: No edema.      Left lower leg: No edema.   Skin:     General: Skin is warm and dry.      Findings: No rash.   Neurological:      Mental Status: She is alert and oriented to person, place, and time.   Psychiatric:         Mood and Affect: Mood normal.         Behavior: Behavior normal.           No visits with results within 4 Month(s) from this visit.   Latest known visit with results is:   Office Visit on 04/17/2023   Component Date Value Ref Range Status    Hemoglobin A1C 04/17/2023 5.9 (H)  4.8 - 5.6 % Final    Comment:          Prediabetes: 5.7 - 6.4           Diabetes: >6.4           Glycemic control for adults with diabetes: <7.0      WBC 04/17/2023 7.4  3.4 - 10.8 x10E3/uL Final    RBC 04/17/2023 4.96  3.77 - 5.28 x10E6/uL " Final    Hemoglobin 04/17/2023 14.4  11.1 - 15.9 g/dL Final    Hematocrit 04/17/2023 43.1  34.0 - 46.6 % Final    MCV 04/17/2023 87  79 - 97 fL Final    MCH 04/17/2023 29.0  26.6 - 33.0 pg Final    MCHC 04/17/2023 33.4  31.5 - 35.7 g/dL Final    RDW 04/17/2023 12.7  11.7 - 15.4 % Final    Platelets 04/17/2023 446  150 - 450 x10E3/uL Final    Neutrophil Rel % 04/17/2023 52  Not Estab. % Final    Lymphocyte Rel % 04/17/2023 37  Not Estab. % Final    Monocyte Rel % 04/17/2023 8  Not Estab. % Final    Eosinophil Rel % 04/17/2023 2  Not Estab. % Final    Basophil Rel % 04/17/2023 1  Not Estab. % Final    Neutrophils Absolute 04/17/2023 3.9  1.4 - 7.0 x10E3/uL Final    Lymphocytes Absolute 04/17/2023 2.8  0.7 - 3.1 x10E3/uL Final    Monocytes Absolute 04/17/2023 0.6  0.1 - 0.9 x10E3/uL Final    Eosinophils Absolute 04/17/2023 0.1  0.0 - 0.4 x10E3/uL Final    Basophils Absolute 04/17/2023 0.1  0.0 - 0.2 x10E3/uL Final    Immature Granulocyte Rel % 04/17/2023 0  Not Estab. % Final    Immature Grans Absolute 04/17/2023 0.0  0.0 - 0.1 x10E3/uL Final    Glucose 04/17/2023 83  70 - 99 mg/dL Final    BUN 04/17/2023 12  6 - 24 mg/dL Final    Creatinine 04/17/2023 0.62  0.57 - 1.00 mg/dL Final    EGFR Result 04/17/2023 114  >59 mL/min/1.73 Final    BUN/Creatinine Ratio 04/17/2023 19  9 - 23 Final    Sodium 04/17/2023 140  134 - 144 mmol/L Final    Potassium 04/17/2023 4.7  3.5 - 5.2 mmol/L Final    Chloride 04/17/2023 107 (H)  96 - 106 mmol/L Final    Total CO2 04/17/2023 21  20 - 29 mmol/L Final    Calcium 04/17/2023 9.4  8.7 - 10.2 mg/dL Final    Total Protein 04/17/2023 7.5  6.0 - 8.5 g/dL Final    Albumin 04/17/2023 4.6  3.8 - 4.8 g/dL Final    Globulin 04/17/2023 2.9  1.5 - 4.5 g/dL Final    A/G Ratio 04/17/2023 1.6  1.2 - 2.2 Final    Total Bilirubin 04/17/2023 0.4  0.0 - 1.2 mg/dL Final    Alkaline Phosphatase 04/17/2023 77  44 - 121 IU/L Final    AST (SGOT) 04/17/2023 18  0 - 40 IU/L Final    ALT (SGPT) 04/17/2023 20  0 -  32 IU/L Final    TSH 04/17/2023 1.080  0.450 - 4.500 uIU/mL Final    Hep C Virus Ab 04/17/2023 Non Reactive  Non Reactive Final    Comment: HCV antibody alone does not differentiate between previously  resolved infection and active infection. Equivocal and Reactive  HCV antibody results should be followed up with an HCV RNA test  to support the diagnosis of active HCV infection.      25 Hydroxy, Vitamin D 04/17/2023 17.7 (L)  30.0 - 100.0 ng/mL Final    Comment: Vitamin D deficiency has been defined by the Onset of  Medicine and an Endocrine Society practice guideline as a  level of serum 25-OH vitamin D less than 20 ng/mL (1,2).  The Endocrine Society went on to further define vitamin D  insufficiency as a level between 21 and 29 ng/mL (2).  1. IOM (Onset of Medicine). 2010. Dietary reference     intakes for calcium and D. [place]: The     National [employer] Press.  2. Roberta MF, Gautam RUSSELL, Kamran GARDINER, et al.     Evaluation, treatment, and prevention of vitamin D     deficiency: an Endocrine Society clinical practice     guideline. JCEM. 2011 Jul; 96(7):1911-30.           Assessment & Plan   Diagnoses and all orders for this visit:    1. Vitamin D deficiency (Primary)  -     Vitamin D,25-Hydroxy    2. Grief reaction    3. Anxiety associated with depression    4. Normocytic anemia  -     CBC & Differential    5. Screening for diabetes mellitus    6. Acute hyperglycemia  -     Hemoglobin A1c  -     Comprehensive Metabolic Panel    7. Need for influenza vaccination  -     Fluzone (or Fluarix & Flulaval for VFC) >6mos    Status of multiple problems reviewed today.  Mood is actually greatly improved.  Continue Zoloft At 50 mg/day.    We will do labs as above to follow-up on her vitamin D deficiency, hyperglycemia, anemia.  I will follow-up with her when her blood tests are back.  Flu shot today.  Blood pressure was noted to be a little elevated.  This is not typical for her at all.  Check blood  pressure at home or local pharmacy every so often.  If systolic blood pressures are running above 140, we may need to make some adjustments.  Follow-up here again in February or sooner if needed.          Call with any problems or concerns before next visit       Return in about 4 months (around 2/16/2024).      Much of this report is an electronic transcription of spoken language to printed text using Dragon dictation software.  As such, the subtleties and finesse of spoken language may permit erroneous, or at times, nonsensical words or phrases to be inadvertently transcribed; thus changes may be made at a later date to rectify these errors.     Kaylene Schroeder [id number] EDT  This note has been electronically signed

## 2023-10-17 LAB
25(OH)D3+25(OH)D2 SERPL-MCNC: 21 NG/ML (ref 30–100)
ALBUMIN SERPL-MCNC: 4.6 G/DL (ref 3.9–4.9)
ALBUMIN/GLOB SERPL: 1.8 {RATIO} (ref 1.2–2.2)
ALP SERPL-CCNC: 72 IU/L (ref 44–121)
ALT SERPL-CCNC: 20 IU/L (ref 0–32)
AST SERPL-CCNC: 18 IU/L (ref 0–40)
BASOPHILS # BLD AUTO: 0.1 X10E3/UL (ref 0–0.2)
BASOPHILS NFR BLD AUTO: 1 %
BILIRUB SERPL-MCNC: 0.8 MG/DL (ref 0–1.2)
BUN SERPL-MCNC: 14 MG/DL (ref 6–24)
BUN/CREAT SERPL: 20 (ref 9–23)
CALCIUM SERPL-MCNC: 9.9 MG/DL (ref 8.7–10.2)
CHLORIDE SERPL-SCNC: 103 MMOL/L (ref 96–106)
CO2 SERPL-SCNC: 23 MMOL/L (ref 20–29)
CREAT SERPL-MCNC: 0.71 MG/DL (ref 0.57–1)
EGFRCR SERPLBLD CKD-EPI 2021: 108 ML/MIN/1.73
EOSINOPHIL # BLD AUTO: 0.1 X10E3/UL (ref 0–0.4)
EOSINOPHIL NFR BLD AUTO: 1 %
ERYTHROCYTE [DISTWIDTH] IN BLOOD BY AUTOMATED COUNT: 12.7 % (ref 11.7–15.4)
GLOBULIN SER CALC-MCNC: 2.5 G/DL (ref 1.5–4.5)
GLUCOSE SERPL-MCNC: 103 MG/DL (ref 70–99)
HBA1C MFR BLD: 5.9 % (ref 4.8–5.6)
HCT VFR BLD AUTO: 45.7 % (ref 34–46.6)
HGB BLD-MCNC: 14.3 G/DL (ref 11.1–15.9)
IMM GRANULOCYTES # BLD AUTO: 0 X10E3/UL (ref 0–0.1)
IMM GRANULOCYTES NFR BLD AUTO: 0 %
LYMPHOCYTES # BLD AUTO: 2.6 X10E3/UL (ref 0.7–3.1)
LYMPHOCYTES NFR BLD AUTO: 35 %
MCH RBC QN AUTO: 28.3 PG (ref 26.6–33)
MCHC RBC AUTO-ENTMCNC: 31.3 G/DL (ref 31.5–35.7)
MCV RBC AUTO: 91 FL (ref 79–97)
MONOCYTES # BLD AUTO: 0.5 X10E3/UL (ref 0.1–0.9)
MONOCYTES NFR BLD AUTO: 7 %
NEUTROPHILS # BLD AUTO: 4.3 X10E3/UL (ref 1.4–7)
NEUTROPHILS NFR BLD AUTO: 56 %
PLATELET # BLD AUTO: 462 X10E3/UL (ref 150–450)
POTASSIUM SERPL-SCNC: 5.1 MMOL/L (ref 3.5–5.2)
PROT SERPL-MCNC: 7.1 G/DL (ref 6–8.5)
RBC # BLD AUTO: 5.05 X10E6/UL (ref 3.77–5.28)
SODIUM SERPL-SCNC: 139 MMOL/L (ref 134–144)
WBC # BLD AUTO: 7.6 X10E3/UL (ref 3.4–10.8)

## 2024-01-14 DIAGNOSIS — F41.8 ANXIETY ASSOCIATED WITH DEPRESSION: ICD-10-CM

## 2024-02-16 ENCOUNTER — OFFICE VISIT (OUTPATIENT)
Dept: FAMILY MEDICINE CLINIC | Facility: CLINIC | Age: 44
End: 2024-02-16
Payer: MEDICARE

## 2024-02-16 ENCOUNTER — TELEPHONE (OUTPATIENT)
Dept: FAMILY MEDICINE CLINIC | Facility: CLINIC | Age: 44
End: 2024-02-16

## 2024-02-16 VITALS
TEMPERATURE: 96.2 F | DIASTOLIC BLOOD PRESSURE: 87 MMHG | HEART RATE: 80 BPM | RESPIRATION RATE: 18 BRPM | HEIGHT: 64 IN | OXYGEN SATURATION: 96 % | WEIGHT: 191.8 LBS | BODY MASS INDEX: 32.74 KG/M2 | SYSTOLIC BLOOD PRESSURE: 125 MMHG

## 2024-02-16 DIAGNOSIS — D64.9 NORMOCYTIC ANEMIA: ICD-10-CM

## 2024-02-16 DIAGNOSIS — R73.03 PREDIABETES: ICD-10-CM

## 2024-02-16 DIAGNOSIS — M79.604 PAIN IN BOTH LOWER EXTREMITIES: ICD-10-CM

## 2024-02-16 DIAGNOSIS — E55.9 VITAMIN D DEFICIENCY: ICD-10-CM

## 2024-02-16 DIAGNOSIS — M79.605 PAIN IN BOTH LOWER EXTREMITIES: ICD-10-CM

## 2024-02-16 DIAGNOSIS — K21.9 GASTROESOPHAGEAL REFLUX DISEASE, UNSPECIFIED WHETHER ESOPHAGITIS PRESENT: ICD-10-CM

## 2024-02-16 DIAGNOSIS — F41.8 ANXIETY ASSOCIATED WITH DEPRESSION: Primary | ICD-10-CM

## 2024-02-16 DIAGNOSIS — F41.8 ANXIETY ASSOCIATED WITH DEPRESSION: ICD-10-CM

## 2024-02-16 PROCEDURE — 1159F MED LIST DOCD IN RCRD: CPT | Performed by: FAMILY MEDICINE

## 2024-02-16 PROCEDURE — 1160F RVW MEDS BY RX/DR IN RCRD: CPT | Performed by: FAMILY MEDICINE

## 2024-02-16 PROCEDURE — 99214 OFFICE O/P EST MOD 30 MIN: CPT | Performed by: FAMILY MEDICINE

## 2024-02-16 RX ORDER — MELOXICAM 15 MG/1
15 TABLET ORAL DAILY
Qty: 90 TABLET | Refills: 1 | Status: SHIPPED | OUTPATIENT
Start: 2024-02-16 | End: 2024-02-16 | Stop reason: SDUPTHER

## 2024-02-16 RX ORDER — MELATONIN
2000 DAILY
Qty: 180 TABLET | Refills: 3 | Status: SHIPPED | OUTPATIENT
Start: 2024-02-16 | End: 2024-02-16 | Stop reason: SDUPTHER

## 2024-02-16 RX ORDER — PANTOPRAZOLE SODIUM 20 MG/1
20 TABLET, DELAYED RELEASE ORAL DAILY
Qty: 90 TABLET | Refills: 3 | Status: SHIPPED | OUTPATIENT
Start: 2024-02-16

## 2024-02-16 RX ORDER — MELATONIN
2000 DAILY
Qty: 180 TABLET | Refills: 3 | Status: SHIPPED | OUTPATIENT
Start: 2024-02-16

## 2024-02-16 RX ORDER — PANTOPRAZOLE SODIUM 20 MG/1
20 TABLET, DELAYED RELEASE ORAL DAILY
Qty: 90 TABLET | Refills: 3 | Status: SHIPPED | OUTPATIENT
Start: 2024-02-16 | End: 2024-02-16 | Stop reason: SDUPTHER

## 2024-02-16 RX ORDER — MELOXICAM 15 MG/1
15 TABLET ORAL DAILY
Qty: 90 TABLET | Refills: 1 | Status: SHIPPED | OUTPATIENT
Start: 2024-02-16

## 2024-02-17 LAB
25(OH)D3+25(OH)D2 SERPL-MCNC: 20.6 NG/ML (ref 30–100)
ALBUMIN SERPL-MCNC: 4.5 G/DL (ref 3.9–4.9)
ALBUMIN/GLOB SERPL: 1.7 {RATIO} (ref 1.2–2.2)
ALP SERPL-CCNC: 73 IU/L (ref 44–121)
ALT SERPL-CCNC: 10 IU/L (ref 0–32)
AST SERPL-CCNC: 12 IU/L (ref 0–40)
BASOPHILS # BLD AUTO: 0.1 X10E3/UL (ref 0–0.2)
BASOPHILS NFR BLD AUTO: 1 %
BILIRUB SERPL-MCNC: 0.5 MG/DL (ref 0–1.2)
BUN SERPL-MCNC: 15 MG/DL (ref 6–24)
BUN/CREAT SERPL: 22 (ref 9–23)
CALCIUM SERPL-MCNC: 9.6 MG/DL (ref 8.7–10.2)
CHLORIDE SERPL-SCNC: 103 MMOL/L (ref 96–106)
CO2 SERPL-SCNC: 22 MMOL/L (ref 20–29)
CREAT SERPL-MCNC: 0.69 MG/DL (ref 0.57–1)
EGFRCR SERPLBLD CKD-EPI 2021: 110 ML/MIN/1.73
EOSINOPHIL # BLD AUTO: 0.1 X10E3/UL (ref 0–0.4)
EOSINOPHIL NFR BLD AUTO: 1 %
ERYTHROCYTE [DISTWIDTH] IN BLOOD BY AUTOMATED COUNT: 13 % (ref 11.7–15.4)
GLOBULIN SER CALC-MCNC: 2.7 G/DL (ref 1.5–4.5)
GLUCOSE SERPL-MCNC: 123 MG/DL (ref 70–99)
HBA1C MFR BLD: 6 % (ref 4.8–5.6)
HCT VFR BLD AUTO: 43.2 % (ref 34–46.6)
HGB BLD-MCNC: 13.8 G/DL (ref 11.1–15.9)
IMM GRANULOCYTES # BLD AUTO: 0 X10E3/UL (ref 0–0.1)
IMM GRANULOCYTES NFR BLD AUTO: 0 %
LYMPHOCYTES # BLD AUTO: 2.9 X10E3/UL (ref 0.7–3.1)
LYMPHOCYTES NFR BLD AUTO: 40 %
MCH RBC QN AUTO: 28.8 PG (ref 26.6–33)
MCHC RBC AUTO-ENTMCNC: 31.9 G/DL (ref 31.5–35.7)
MCV RBC AUTO: 90 FL (ref 79–97)
MONOCYTES # BLD AUTO: 0.5 X10E3/UL (ref 0.1–0.9)
MONOCYTES NFR BLD AUTO: 7 %
NEUTROPHILS # BLD AUTO: 3.8 X10E3/UL (ref 1.4–7)
NEUTROPHILS NFR BLD AUTO: 51 %
PLATELET # BLD AUTO: 411 X10E3/UL (ref 150–450)
POTASSIUM SERPL-SCNC: 5.2 MMOL/L (ref 3.5–5.2)
PROT SERPL-MCNC: 7.2 G/DL (ref 6–8.5)
RBC # BLD AUTO: 4.8 X10E6/UL (ref 3.77–5.28)
SODIUM SERPL-SCNC: 138 MMOL/L (ref 134–144)
WBC # BLD AUTO: 7.4 X10E3/UL (ref 3.4–10.8)

## 2024-02-28 ENCOUNTER — APPOINTMENT (OUTPATIENT)
Dept: NUCLEAR MEDICINE | Facility: HOSPITAL | Age: 44
End: 2024-02-28
Payer: MEDICARE

## 2024-02-28 ENCOUNTER — HOSPITAL ENCOUNTER (OUTPATIENT)
Facility: HOSPITAL | Age: 44
Discharge: HOME | End: 2024-02-28
Attending: EMERGENCY MEDICINE | Admitting: HOSPITALIST
Payer: MEDICARE

## 2024-02-28 ENCOUNTER — APPOINTMENT (OUTPATIENT)
Dept: CT IMAGING | Facility: HOSPITAL | Age: 44
End: 2024-02-28
Payer: MEDICARE

## 2024-02-28 ENCOUNTER — APPOINTMENT (OUTPATIENT)
Dept: CARDIOLOGY | Facility: HOSPITAL | Age: 44
End: 2024-02-28
Payer: MEDICARE

## 2024-02-28 ENCOUNTER — READMISSION MANAGEMENT (OUTPATIENT)
Dept: CALL CENTER | Facility: HOSPITAL | Age: 44
End: 2024-02-28
Payer: MEDICARE

## 2024-02-28 VITALS
OXYGEN SATURATION: 94 % | HEART RATE: 94 BPM | SYSTOLIC BLOOD PRESSURE: 140 MMHG | RESPIRATION RATE: 15 BRPM | WEIGHT: 194 LBS | TEMPERATURE: 99 F | BODY MASS INDEX: 33.12 KG/M2 | HEIGHT: 64 IN | DIASTOLIC BLOOD PRESSURE: 89 MMHG

## 2024-02-28 DIAGNOSIS — R07.9 CHEST PAIN, UNSPECIFIED TYPE: Primary | ICD-10-CM

## 2024-02-28 PROBLEM — K21.9 GERD (GASTROESOPHAGEAL REFLUX DISEASE): Status: ACTIVE | Noted: 2024-02-28

## 2024-02-28 LAB
ALBUMIN SERPL-MCNC: 4.4 G/DL (ref 3.5–5.2)
ALBUMIN/GLOB SERPL: 1.6 G/DL
ALP SERPL-CCNC: 76 U/L (ref 39–117)
ALT SERPL W P-5'-P-CCNC: 17 U/L (ref 1–33)
ANION GAP SERPL CALCULATED.3IONS-SCNC: 10 MMOL/L (ref 5–15)
APTT PPP: 24.9 SECONDS (ref 61–76.5)
AST SERPL-CCNC: 18 U/L (ref 1–32)
BASOPHILS # BLD AUTO: 0.1 10*3/MM3 (ref 0–0.2)
BASOPHILS NFR BLD AUTO: 1.1 % (ref 0–1.5)
BH CV ECHO LEFT VENTRICLE GLOBAL LONGITUDINAL STRAIN: -18.6 %
BH CV ECHO MEAS - ACS: 2.1 CM
BH CV ECHO MEAS - AO ROOT DIAM: 2.9 CM
BH CV ECHO MEAS - EDV(CUBED): 59.3 ML
BH CV ECHO MEAS - EDV(MOD-SP2): 63.7 ML
BH CV ECHO MEAS - EDV(MOD-SP4): 73.4 ML
BH CV ECHO MEAS - EF(MOD-BP): 57.6 %
BH CV ECHO MEAS - EF(MOD-SP2): 57.5 %
BH CV ECHO MEAS - EF(MOD-SP4): 55.3 %
BH CV ECHO MEAS - EF_3D-VOL: 58 %
BH CV ECHO MEAS - ESV(CUBED): 13.8 ML
BH CV ECHO MEAS - ESV(MOD-SP2): 27.1 ML
BH CV ECHO MEAS - ESV(MOD-SP4): 32.8 ML
BH CV ECHO MEAS - FS: 38.5 %
BH CV ECHO MEAS - IVS/LVPW: 1.1 CM
BH CV ECHO MEAS - IVSD: 1.1 CM
BH CV ECHO MEAS - LA DIMENSION: 3 CM
BH CV ECHO MEAS - LAT PEAK E' VEL: 15.9 CM/SEC
BH CV ECHO MEAS - LV DIASTOLIC VOL/BSA (35-75): 38.2 CM2
BH CV ECHO MEAS - LV MASS(C)D: 131 GRAMS
BH CV ECHO MEAS - LV MAX PG: 3.6 MMHG
BH CV ECHO MEAS - LV MEAN PG: 2 MMHG
BH CV ECHO MEAS - LV SYSTOLIC VOL/BSA (12-30): 17.1 CM2
BH CV ECHO MEAS - LV V1 MAX: 95.4 CM/SEC
BH CV ECHO MEAS - LV V1 VTI: 17.3 CM
BH CV ECHO MEAS - LVIDD: 3.9 CM
BH CV ECHO MEAS - LVIDS: 2.4 CM
BH CV ECHO MEAS - LVOT AREA: 3.1 CM2
BH CV ECHO MEAS - LVOT DIAM: 2 CM
BH CV ECHO MEAS - LVPWD: 1 CM
BH CV ECHO MEAS - MED PEAK E' VEL: 11 CM/SEC
BH CV ECHO MEAS - MV A MAX VEL: 85.4 CM/SEC
BH CV ECHO MEAS - MV DEC SLOPE: 334 CM/SEC2
BH CV ECHO MEAS - MV DEC TIME: 0.2 SEC
BH CV ECHO MEAS - MV E MAX VEL: 85.4 CM/SEC
BH CV ECHO MEAS - MV E/A: 1
BH CV ECHO MEAS - MV MAX PG: 3.4 MMHG
BH CV ECHO MEAS - MV MEAN PG: 2 MMHG
BH CV ECHO MEAS - MV P1/2T: 79.7 MSEC
BH CV ECHO MEAS - MV V2 VTI: 22 CM
BH CV ECHO MEAS - MVA(P1/2T): 2.8 CM2
BH CV ECHO MEAS - MVA(VTI): 2.47 CM2
BH CV ECHO MEAS - PA ACC TIME: 0.08 SEC
BH CV ECHO MEAS - PA V2 MAX: 89 CM/SEC
BH CV ECHO MEAS - PI END-D VEL: 71.7 CM/SEC
BH CV ECHO MEAS - PULM A REVS DUR: 0.12 SEC
BH CV ECHO MEAS - PULM A REVS VEL: 44.2 CM/SEC
BH CV ECHO MEAS - PULM DIAS VEL: 44.2 CM/SEC
BH CV ECHO MEAS - PULM S/D: 1.29
BH CV ECHO MEAS - PULM SYS VEL: 56.8 CM/SEC
BH CV ECHO MEAS - QP/QS: 0.98
BH CV ECHO MEAS - RAP SYSTOLE: 3 MMHG
BH CV ECHO MEAS - RV MAX PG: 1.84 MMHG
BH CV ECHO MEAS - RV V1 MAX: 67.9 CM/SEC
BH CV ECHO MEAS - RV V1 VTI: 11.8 CM
BH CV ECHO MEAS - RVDD: 2.5 CM
BH CV ECHO MEAS - RVOT DIAM: 2.4 CM
BH CV ECHO MEAS - SI(MOD-SP2): 19 ML/M2
BH CV ECHO MEAS - SI(MOD-SP4): 21.1 ML/M2
BH CV ECHO MEAS - SV(LVOT): 54.3 ML
BH CV ECHO MEAS - SV(MOD-SP2): 36.6 ML
BH CV ECHO MEAS - SV(MOD-SP4): 40.6 ML
BH CV ECHO MEAS - SV(RVOT): 53.4 ML
BH CV ECHO MEAS - TAPSE (>1.6): 1.83 CM
BH CV ECHO MEASUREMENTS AVERAGE E/E' RATIO: 6.35
BH CV NUCLEAR PRIOR STUDY: 3
BH CV REST NUCLEAR ISOTOPE DOSE: 11 MCI
BH CV STRESS BP STAGE 1: NORMAL
BH CV STRESS BP STAGE 2: NORMAL
BH CV STRESS DURATION MIN STAGE 1: 3
BH CV STRESS DURATION MIN STAGE 2: 3
BH CV STRESS DURATION SEC STAGE 1: 0
BH CV STRESS DURATION SEC STAGE 2: 0
BH CV STRESS GRADE STAGE 1: 10
BH CV STRESS GRADE STAGE 2: 12
BH CV STRESS HR STAGE 1: 145
BH CV STRESS HR STAGE 2: 156
BH CV STRESS METS STAGE 1: 5
BH CV STRESS METS STAGE 2: 7.5
BH CV STRESS NUCLEAR ISOTOPE DOSE: 31 MCI
BH CV STRESS PROTOCOL 1: NORMAL
BH CV STRESS RECOVERY BP: NORMAL MMHG
BH CV STRESS RECOVERY HR: 140 BPM
BH CV STRESS SPEED STAGE 1: 1.7
BH CV STRESS SPEED STAGE 2: 2.5
BH CV STRESS STAGE 1: 1
BH CV STRESS STAGE 2: 2
BH CV XLRA - RV BASE: 3.4 CM
BH CV XLRA - RV LENGTH: 7.4 CM
BH CV XLRA - RV MID: 2.9 CM
BH CV XLRA - TDI S': 13.6 CM/SEC
BILIRUB SERPL-MCNC: 0.2 MG/DL (ref 0–1.2)
BUN SERPL-MCNC: 16 MG/DL (ref 6–20)
BUN/CREAT SERPL: 21.6 (ref 7–25)
CALCIUM SPEC-SCNC: 9.6 MG/DL (ref 8.6–10.5)
CHLORIDE SERPL-SCNC: 106 MMOL/L (ref 98–107)
CHOLEST SERPL-MCNC: 208 MG/DL (ref 0–200)
CO2 SERPL-SCNC: 23 MMOL/L (ref 22–29)
CREAT SERPL-MCNC: 0.74 MG/DL (ref 0.57–1)
DEPRECATED RDW RBC AUTO: 44.2 FL (ref 37–54)
EGFRCR SERPLBLD CKD-EPI 2021: 103.1 ML/MIN/1.73
EOSINOPHIL # BLD AUTO: 0.2 10*3/MM3 (ref 0–0.4)
EOSINOPHIL NFR BLD AUTO: 2.2 % (ref 0.3–6.2)
ERYTHROCYTE [DISTWIDTH] IN BLOOD BY AUTOMATED COUNT: 14.1 % (ref 12.3–15.4)
GLOBULIN UR ELPH-MCNC: 2.8 GM/DL
GLUCOSE SERPL-MCNC: 122 MG/DL (ref 65–99)
HBA1C MFR BLD: 5.4 % (ref 4.8–5.6)
HCT VFR BLD AUTO: 41.6 % (ref 34–46.6)
HDLC SERPL-MCNC: 51 MG/DL (ref 40–60)
HGB BLD-MCNC: 13.6 G/DL (ref 12–15.9)
INR PPP: 0.93 (ref 0.93–1.1)
LDLC SERPL CALC-MCNC: 136 MG/DL (ref 0–100)
LDLC/HDLC SERPL: 2.61 {RATIO}
LEFT ATRIUM VOLUME INDEX: 14.7 ML/M2
LYMPHOCYTES # BLD AUTO: 4 10*3/MM3 (ref 0.7–3.1)
LYMPHOCYTES NFR BLD AUTO: 46.3 % (ref 19.6–45.3)
MAXIMAL PREDICTED HEART RATE: 177 BPM
MCH RBC QN AUTO: 29.3 PG (ref 26.6–33)
MCHC RBC AUTO-ENTMCNC: 32.7 G/DL (ref 31.5–35.7)
MCV RBC AUTO: 89.7 FL (ref 79–97)
MONOCYTES # BLD AUTO: 0.7 10*3/MM3 (ref 0.1–0.9)
MONOCYTES NFR BLD AUTO: 7.9 % (ref 5–12)
NEUTROPHILS NFR BLD AUTO: 3.7 10*3/MM3 (ref 1.7–7)
NEUTROPHILS NFR BLD AUTO: 42.5 % (ref 42.7–76)
NRBC BLD AUTO-RTO: 0.1 /100 WBC (ref 0–0.2)
PERCENT MAX PREDICTED HR: 88.14 %
PLATELET # BLD AUTO: 387 10*3/MM3 (ref 140–450)
PMV BLD AUTO: 8.4 FL (ref 6–12)
POTASSIUM SERPL-SCNC: 4.2 MMOL/L (ref 3.5–5.2)
PROT SERPL-MCNC: 7.2 G/DL (ref 6–8.5)
PROTHROMBIN TIME: 10.2 SECONDS (ref 9.6–11.7)
QT INTERVAL: 336 MS
QTC INTERVAL: 425 MS
RBC # BLD AUTO: 4.63 10*6/MM3 (ref 3.77–5.28)
RBC MORPH BLD: NORMAL
SINUS: 2.9 CM
SMALL PLATELETS BLD QL SMEAR: ADEQUATE
SODIUM SERPL-SCNC: 139 MMOL/L (ref 136–145)
STJ: 2.2 CM
STRESS BASELINE BP: NORMAL MMHG
STRESS BASELINE HR: 117 BPM
STRESS PERCENT HR: 104 %
STRESS POST ESTIMATED WORKLOAD: 5.8 METS
STRESS POST EXERCISE DUR MIN: 6 MIN
STRESS POST PEAK BP: NORMAL MMHG
STRESS POST PEAK HR: 156 BPM
STRESS TARGET HR: 150 BPM
TRIGL SERPL-MCNC: 120 MG/DL (ref 0–150)
TROPONIN T SERPL HS-MCNC: <6 NG/L
TROPONIN T SERPL HS-MCNC: <6 NG/L
VLDLC SERPL-MCNC: 21 MG/DL (ref 5–40)
WBC MORPH BLD: NORMAL
WBC NRBC COR # BLD AUTO: 8.7 10*3/MM3 (ref 3.4–10.8)

## 2024-02-28 PROCEDURE — 96374 THER/PROPH/DIAG INJ IV PUSH: CPT

## 2024-02-28 PROCEDURE — G0378 HOSPITAL OBSERVATION PER HR: HCPCS

## 2024-02-28 PROCEDURE — 71275 CT ANGIOGRAPHY CHEST: CPT

## 2024-02-28 PROCEDURE — 93017 CV STRESS TEST TRACING ONLY: CPT

## 2024-02-28 PROCEDURE — A9502 TC99M TETROFOSMIN: HCPCS | Performed by: HOSPITALIST

## 2024-02-28 PROCEDURE — 80053 COMPREHEN METABOLIC PANEL: CPT | Performed by: EMERGENCY MEDICINE

## 2024-02-28 PROCEDURE — 99204 OFFICE O/P NEW MOD 45 MIN: CPT | Performed by: INTERNAL MEDICINE

## 2024-02-28 PROCEDURE — 83036 HEMOGLOBIN GLYCOSYLATED A1C: CPT | Performed by: NURSE PRACTITIONER

## 2024-02-28 PROCEDURE — 93018 CV STRESS TEST I&R ONLY: CPT | Performed by: INTERNAL MEDICINE

## 2024-02-28 PROCEDURE — 36415 COLL VENOUS BLD VENIPUNCTURE: CPT

## 2024-02-28 PROCEDURE — 96361 HYDRATE IV INFUSION ADD-ON: CPT

## 2024-02-28 PROCEDURE — 93005 ELECTROCARDIOGRAM TRACING: CPT | Performed by: EMERGENCY MEDICINE

## 2024-02-28 PROCEDURE — 93005 ELECTROCARDIOGRAM TRACING: CPT

## 2024-02-28 PROCEDURE — 0 TECHNETIUM TETROFOSMIN KIT: Performed by: HOSPITALIST

## 2024-02-28 PROCEDURE — 84484 ASSAY OF TROPONIN QUANT: CPT | Performed by: NURSE PRACTITIONER

## 2024-02-28 PROCEDURE — 85610 PROTHROMBIN TIME: CPT | Performed by: EMERGENCY MEDICINE

## 2024-02-28 PROCEDURE — 93356 MYOCRD STRAIN IMG SPCKL TRCK: CPT | Performed by: INTERNAL MEDICINE

## 2024-02-28 PROCEDURE — 93306 TTE W/DOPPLER COMPLETE: CPT

## 2024-02-28 PROCEDURE — 63710000001 NITROGLYCERIN 2 % OINTMENT: Performed by: EMERGENCY MEDICINE

## 2024-02-28 PROCEDURE — 93356 MYOCRD STRAIN IMG SPCKL TRCK: CPT

## 2024-02-28 PROCEDURE — 93306 TTE W/DOPPLER COMPLETE: CPT | Performed by: INTERNAL MEDICINE

## 2024-02-28 PROCEDURE — 25810000003 SODIUM CHLORIDE 0.9 % SOLUTION: Performed by: NURSE PRACTITIONER

## 2024-02-28 PROCEDURE — 84484 ASSAY OF TROPONIN QUANT: CPT | Performed by: EMERGENCY MEDICINE

## 2024-02-28 PROCEDURE — 80061 LIPID PANEL: CPT | Performed by: NURSE PRACTITIONER

## 2024-02-28 PROCEDURE — 85007 BL SMEAR W/DIFF WBC COUNT: CPT | Performed by: EMERGENCY MEDICINE

## 2024-02-28 PROCEDURE — 25510000001 IOPAMIDOL PER 1 ML: Performed by: EMERGENCY MEDICINE

## 2024-02-28 PROCEDURE — 85730 THROMBOPLASTIN TIME PARTIAL: CPT | Performed by: EMERGENCY MEDICINE

## 2024-02-28 PROCEDURE — A9270 NON-COVERED ITEM OR SERVICE: HCPCS | Performed by: EMERGENCY MEDICINE

## 2024-02-28 PROCEDURE — 99285 EMERGENCY DEPT VISIT HI MDM: CPT

## 2024-02-28 PROCEDURE — 78452 HT MUSCLE IMAGE SPECT MULT: CPT

## 2024-02-28 PROCEDURE — 78452 HT MUSCLE IMAGE SPECT MULT: CPT | Performed by: INTERNAL MEDICINE

## 2024-02-28 PROCEDURE — 85025 COMPLETE CBC W/AUTO DIFF WBC: CPT | Performed by: EMERGENCY MEDICINE

## 2024-02-28 RX ORDER — PANTOPRAZOLE SODIUM 40 MG/10ML
40 INJECTION, POWDER, LYOPHILIZED, FOR SOLUTION INTRAVENOUS
Status: DISCONTINUED | OUTPATIENT
Start: 2024-02-28 | End: 2024-02-28 | Stop reason: HOSPADM

## 2024-02-28 RX ORDER — SODIUM CHLORIDE 9 MG/ML
100 INJECTION, SOLUTION INTRAVENOUS CONTINUOUS
Status: DISCONTINUED | OUTPATIENT
Start: 2024-02-28 | End: 2024-02-28 | Stop reason: HOSPADM

## 2024-02-28 RX ORDER — ATORVASTATIN CALCIUM 10 MG/1
10 TABLET, FILM COATED ORAL NIGHTLY
Status: DISCONTINUED | OUTPATIENT
Start: 2024-02-28 | End: 2024-02-28 | Stop reason: HOSPADM

## 2024-02-28 RX ORDER — ASPIRIN 81 MG/1
324 TABLET, CHEWABLE ORAL ONCE
Status: COMPLETED | OUTPATIENT
Start: 2024-02-28 | End: 2024-02-28

## 2024-02-28 RX ORDER — ATORVASTATIN CALCIUM 10 MG/1
10 TABLET, FILM COATED ORAL NIGHTLY
Qty: 90 TABLET | Refills: 0 | Status: SHIPPED | OUTPATIENT
Start: 2024-02-28

## 2024-02-28 RX ORDER — MORPHINE SULFATE 2 MG/ML
2 INJECTION, SOLUTION INTRAMUSCULAR; INTRAVENOUS ONCE AS NEEDED
Status: DISCONTINUED | OUTPATIENT
Start: 2024-02-28 | End: 2024-02-28 | Stop reason: HOSPADM

## 2024-02-28 RX ORDER — SODIUM CHLORIDE 0.9 % (FLUSH) 0.9 %
10 SYRINGE (ML) INJECTION AS NEEDED
Status: DISCONTINUED | OUTPATIENT
Start: 2024-02-28 | End: 2024-02-28 | Stop reason: HOSPADM

## 2024-02-28 RX ADMIN — ASPIRIN 81 MG CHEWABLE TABLET 324 MG: 81 TABLET CHEWABLE at 04:48

## 2024-02-28 RX ADMIN — PANTOPRAZOLE SODIUM 40 MG: 40 INJECTION, POWDER, FOR SOLUTION INTRAVENOUS at 07:37

## 2024-02-28 RX ADMIN — TETROFOSMIN 1 DOSE: 1.38 INJECTION, POWDER, LYOPHILIZED, FOR SOLUTION INTRAVENOUS at 08:11

## 2024-02-28 RX ADMIN — NITROGLYCERIN 0.5 INCH: 20 OINTMENT TOPICAL at 05:52

## 2024-02-28 RX ADMIN — IOPAMIDOL 100 ML: 755 INJECTION, SOLUTION INTRAVENOUS at 04:01

## 2024-02-28 RX ADMIN — TETROFOSMIN 1 DOSE: 1.38 INJECTION, POWDER, LYOPHILIZED, FOR SOLUTION INTRAVENOUS at 07:01

## 2024-02-28 RX ADMIN — SODIUM CHLORIDE 100 ML/HR: 9 INJECTION, SOLUTION INTRAVENOUS at 10:24

## 2024-02-28 NOTE — H&P
[facility] Medicine Services  History & Physical    Patient Name: Suzie Duvall  : 1980  MRN: [id number]  Primary Care Physician:  Kaylene Schroeder MD  Date of admission: 2024  Date and Time of Service: 2024 at 0500    Subjective      Chief Complaint: chest pain     History of Present Illness: Suzie Duvall is a 43 y.o. female with a CMH of obesity BMI 33, anxiety depression, GERD, who presented to [facility] on 2024 with plaints of chest pain which developed tonight.  She reports it is it was sharp behind her scapula going down her left arm with numbness.  She reports associated shortness of breath and nausea without vomiting.  She reports she may have had this type of discomfort before but it was not as bad she can record it.  She also reports early onset heart disease in her family.  Her mother  in her 60s.    Labs today show troponin of -6, labs are unremarkable except for glucose of 122, recent A1c 6.0 CTA chest per radiology shows no evidence of pulmonary embolism mild lingular and left basilar atelectasis.EKG shows normal sinus rhythm heart rate 96 no acute ST elevation or depression.  Given risk factors for heart disease, Myoview stress test has been ordered in a.m. and 2D echo.  She will be admitted for further evaluation and treatment.  Lipid panel and A1c in a.m.  She was given aspirin and Nitropaste in ED.    Review of Systems   Constitutional:  Positive for diaphoresis.   HENT: Negative.     Eyes: Negative.    Respiratory:  Positive for shortness of breath.    Cardiovascular:  Positive for chest pain.   Gastrointestinal:  Positive for nausea.   Endocrine: Negative.    Genitourinary: Negative.    Musculoskeletal: Negative.    Skin: Negative.    Allergic/Immunologic: Negative.    Neurological: Negative.    Hematological: Negative.    Psychiatric/Behavioral:  The patient is nervous/anxious.    All other systems reviewed and are negative.      Personal  History     Past Medical History:   Diagnosis Date    Acute peritonitis 05/30/2020    Anxiety     Depression     GERD (gastroesophageal reflux disease)     Obesity (BMI 30-39.9) 06/02/2020       Past Surgical History:   Procedure Laterality Date    EXPLORATORY LAPAROTOMY N/A 5/30/2020    Procedure: LAPAROTOMY EXPLORATORY;  Surgeon: Scarlet Ruvalcaba MD;  Location: [facility] MAIN OR;  Service: General;  Laterality: N/A;       Family History: family history includes Diabetes in her father; Heart failure in her mother. Otherwise pertinent FHx was reviewed and not pertinent to current issue.    Social History:  reports that she has never smoked. She has never been exposed to tobacco smoke. She has never used smokeless tobacco. Drug use questions deferred to the physician. She reports that she does not drink alcohol.    Home Medications:  Prior to Admission Medications       Prescriptions Last Dose Informant Patient Reported? Taking?    cholecalciferol (Vitamin D, Cholecalciferol,) 25 MCG (1000 UT) tablet   No No    Take 2 tablets by mouth Daily.    meloxicam (MOBIC) 15 MG tablet   No No    Take 1 tablet by mouth Daily.    pantoprazole (Protonix) 20 MG EC tablet   No No    Take 1 tablet by mouth Daily.    sertraline (ZOLOFT) 50 MG tablet   No No    Take 1 tablet by mouth Daily.              Allergies:  No Known Allergies    Objective      Vitals:   Temp:  [97.8 °F (36.6 °C)] 97.8 °F (36.6 °C)  Heart Rate:  [] 95  Resp:  [18] 18  BP: (122-146)/(87-97) 124/87  Body mass index is 33.3 kg/m².  Physical Exam  Vitals reviewed.   Constitutional:       Appearance: Normal appearance. She is obese.   HENT:      Head: Normocephalic and atraumatic.      Right Ear: External ear normal.      Left Ear: External ear normal.      Nose: Nose normal.      Mouth/Throat:      Mouth: Mucous membranes are moist.   Eyes:      Extraocular Movements: Extraocular movements intact.   Cardiovascular:      Rate and Rhythm: Normal rate and regular  rhythm.      Pulses: Normal pulses.      Heart sounds: Normal heart sounds.   Pulmonary:      Effort: Pulmonary effort is normal.      Breath sounds: Normal breath sounds.   Abdominal:      Palpations: Abdomen is soft.   Genitourinary:     Comments: deferred  Musculoskeletal:         General: Normal range of motion.      Cervical back: Normal range of motion and neck supple.   Skin:     General: Skin is warm and dry.   Neurological:      General: No focal deficit present.      Mental Status: She is alert and oriented to person, place, and time.   Psychiatric:         Mood and Affect: Mood normal.         Behavior: Behavior normal.         Thought Content: Thought content normal.         Judgment: Judgment normal.         Diagnostic Data:  Lab Results (last 24 hours)       Procedure Component Value Units Date/Time    CBC & Differential [[id number]]  (Abnormal) Collected: 02/28/24 0250    Specimen: Blood Updated: 02/28/24 0340    Narrative:      The following orders were created for panel order CBC & Differential.  Procedure                               Abnormality         Status                     ---------                               -----------         ------                     CBC Auto Differential[[id number]]        Abnormal            Final result               Scan Slide[[id number]]                                       Final result                 Please view results for these tests on the individual orders.    CBC Auto Differential [[id number]]  (Abnormal) Collected: 02/28/24 0250    Specimen: Blood Updated: 02/28/24 0340     WBC 8.70 10*3/mm3      RBC 4.63 10*6/mm3      Hemoglobin 13.6 g/dL      Hematocrit 41.6 %      MCV 89.7 fL      MCH 29.3 pg      MCHC 32.7 g/dL      RDW 14.1 %      RDW-SD 44.2 fl      MPV 8.4 fL      Platelets 387 10*3/mm3      Neutrophil % 42.5 %      Lymphocyte % 46.3 %      Monocyte % 7.9 %      Eosinophil % 2.2 %      Basophil % 1.1 %      Neutrophils, Absolute 3.70 10*3/mm3       Lymphocytes, Absolute 4.00 10*3/mm3      Monocytes, Absolute 0.70 10*3/mm3      Eosinophils, Absolute 0.20 10*3/mm3      Basophils, Absolute 0.10 10*3/mm3      nRBC 0.1 /100 WBC     Scan Slide [[id number]] Collected: 02/28/24 0250    Specimen: Blood Updated: 02/28/24 0340     RBC Morphology Normal     WBC Morphology Normal     Platelet Estimate Adequate    Comprehensive Metabolic Panel [[id number]]  (Abnormal) Collected: 02/28/24 0250    Specimen: Blood Updated: 02/28/24 0325     Glucose 122 mg/dL      BUN 16 mg/dL      Creatinine 0.74 mg/dL      Sodium 139 mmol/L      Potassium 4.2 mmol/L      Comment: Slight hemolysis detected by analyzer. Result may be falsely elevated.        Chloride 106 mmol/L      CO2 23.0 mmol/L      Calcium 9.6 mg/dL      Total Protein 7.2 g/dL      Albumin 4.4 g/dL      ALT (SGPT) 17 U/L      AST (SGOT) 18 U/L      Alkaline Phosphatase 76 U/L      Total Bilirubin 0.2 mg/dL      Globulin 2.8 gm/dL      A/G Ratio 1.6 g/dL      BUN/Creatinine Ratio 21.6     Anion Gap 10.0 mmol/L      eGFR 103.1 mL/min/1.73     Narrative:      GFR Normal >60  Chronic Kidney Disease <60  Kidney Failure <15      High Sensitivity Troponin T [[id number]]  (Normal) Collected: 02/28/24 0250    Specimen: Blood Updated: 02/28/24 0325     HS Troponin T <6 ng/L     Narrative:      High Sensitive Troponin T Reference Range:  <14.0 ng/L- Negative Female for AMI  <22.0 ng/L- Negative Male for AMI  >=14 - Abnormal Female indicating possible myocardial injury.  >=22 - Abnormal Male indicating possible myocardial injury.   Clinicians would have to utilize clinical acumen, EKG, Troponin, and serial changes to determine if it is an Acute Myocardial Infarction or myocardial injury due to an underlying chronic condition.         Protime-INR [[id number]]  (Normal) Collected: 02/28/24 0250    Specimen: Blood Updated: 02/28/24 0313     Protime 10.2 Seconds      INR 0.93    aPTT [[id number]]  (Abnormal) Collected: 02/28/24 0250     Specimen: Blood Updated: 02/28/24 0313     PTT 24.9 seconds              Imaging Results (Last 24 Hours)       Procedure Component Value Units Date/Time    CT Angiogram Chest [[id number]] Collected: 02/28/24 0432     Updated: 02/28/24 0437    Narrative:      CT ANGIOGRAM CHEST    Date of Exam: 2/28/2024 3:40 AM EST    Indication: cp.    Comparison: None available.    Technique: CTA of the chest was performed after the uneventful intravenous administration of iodinated contrast. Reconstructed coronal and sagittal images were also obtained. In addition, a 3-D volume rendered image was created for interpretation.   Automated exposure control and iterative reconstruction methods were used.      Findings:    Pulmonary arteries: Adequate opacification of the pulmonary arteries. No evidence of acute pulmonary embolism.    Lungs and Pleura: There is mild lingular and left basilar atelectasis.    Mediastinum/Rita: No mediastinal or hilar lymphadenopathy.    Lymph nodes: No axillary or supraclavicular adenopathy.    Cardiovascular: The cardiac chambers are within normal limits. The pericardium is normal. The aorta and its arch branch vessels are unremarkable.       Upper Abdomen: There is a stable right renal cyst. Otherwise, the upper abdominal contents are unremarkable.          Bones and Soft Tissue: No suspicious osseous lesion.        Impression:      Impression:  No evidence of pulmonary embolism. Mild lingular and left basilar atelectasis.        Electronically Signed: Naveen Alejo MD    2/28/2024 4:35 AM EST    Workstation ID: [id number]              Assessment & Plan        This is a 43 y.o. female with:    Active and Resolved Problems  Active Hospital Problems    Diagnosis  POA    **Chest pain [R07.9]  Yes     Priority: High    GERD (gastroesophageal reflux disease) [K21.9]  Yes    Anxiety associated with depression [F41.8]  Yes    Prediabetes [R73.03]  Yes    Obesity (BMI 30-39.9) [E66.9]  Yes      Resolved Hospital  Problems   No resolved problems to display.     Chest pain, EKG normal sinus rhythm first troponin negative, CTA chest negative for pulmonary embolism.  Given risk factors for heart disease, repeat troponin, continuous cardiac monitoring, 2D echo in a.m. and Myoview stress test, n.p.o. and normal saline at 100 cc/h, given aspirin and Nitropaste in ED lipid panel and A1c in a.m.    GERD, on home PPI, changed to 40 mg IV Protonix x 1    Anxiety/depression, on home sertraline reorder pending verification    Prediabetes, hemoglobin recently 6.0 glucose 122, lifestyle management education    Obesity BMI 33 lifestyle management education    DVT prophylaxis:  No DVT prophylaxis order currently exists.        The patient desires to be as follows:    CODE STATUS:           Admission Status:  I believe this patient meets observation status.    Expected Length of Stay: 1 day pending results of stress test     PDMP and Medication Dispenses via Sidebar reviewed and consistent with patient reported medications.    I discussed the patient's findings and my recommendations with patient and family.      Signature:     This document has been electronically signed by MORRIS Robertson on February 28, 2024 05:18 [facility] Hospitalist Team

## 2024-02-28 NOTE — ED PROVIDER NOTES
Subjective   History of Present Illness  Chief complaint: Patient is a 43-year-old who around 10 PM tonight developed chest pain.  It is a sharp pain in her chest that she is feeling behind her scapula going down her left arm.  Next left arm numb.  She states she was lying in bed when this happened.  It has been persistent severe since.  Makes her short of breath as well.  There is early onset heart disease in the family.  She has had no recent swelling.    Context:    Duration:    Timing: Acute sudden onset    Severity: Severe    Associated Symptoms:        PCP:  LMP:      Review of Systems   Constitutional:  Negative for fever.   HENT: Negative.     Cardiovascular:  Positive for chest pain.   Gastrointestinal:  Negative for abdominal pain.   Genitourinary: Negative.    Musculoskeletal:  Positive for arthralgias.   Neurological:  Positive for numbness.       Past Medical History:   Diagnosis Date    Acute peritonitis 05/30/2020    Anxiety     Depression     GERD (gastroesophageal reflux disease)     Obesity (BMI 30-39.9) 06/02/2020       No Known Allergies    Past Surgical History:   Procedure Laterality Date    EXPLORATORY LAPAROTOMY N/A 5/30/2020    Procedure: LAPAROTOMY EXPLORATORY;  Surgeon: Scarlet Ruvalcaba MD;  Location: [facility];  Service: General;  Laterality: N/A;       Family History   Problem Relation Age of Onset    Heart failure Mother     Diabetes Father        Social History     Socioeconomic History    Marital status:    Tobacco Use    Smoking status: Never     Passive exposure: Never    Smokeless tobacco: Never   Vaping Use    Vaping Use: Never used   Substance and Sexual Activity    Alcohol use: Never    Drug use: Defer    Sexual activity: Yes     Partners: Male           Objective   Physical Exam  Vitals and nursing note reviewed.   HENT:      Head: Normocephalic and atraumatic.   Cardiovascular:      Rate and Rhythm: Normal rate and regular rhythm.      Heart sounds: Normal heart  sounds.   Pulmonary:      Effort: Pulmonary effort is normal.      Breath sounds: Normal breath sounds.   Abdominal:      Tenderness: There is no abdominal tenderness.   Musculoskeletal:         General: Normal range of motion.      Cervical back: Normal range of motion and neck supple.   Neurological:      General: No focal deficit present.      Mental Status: She is alert and oriented to person, place, and time.      Cranial Nerves: No cranial nerve deficit.      Motor: No weakness.      Coordination: Coordination normal.   Psychiatric:         Mood and Affect: Mood normal.         Thought Content: Thought content normal.         Procedures           ED Course                                   Results for orders placed or performed during the hospital encounter of 02/28/24   Comprehensive Metabolic Panel    Specimen: Blood   Result Value Ref Range    Glucose 122 (H) 65 - 99 mg/dL    BUN 16 6 - 20 mg/dL    Creatinine 0.74 0.57 - 1.00 mg/dL    Sodium 139 136 - 145 mmol/L    Potassium 4.2 3.5 - 5.2 mmol/L    Chloride 106 98 - 107 mmol/L    CO2 23.0 22.0 - 29.0 mmol/L    Calcium 9.6 8.6 - 10.5 mg/dL    Total Protein 7.2 6.0 - 8.5 g/dL    Albumin 4.4 3.5 - 5.2 g/dL    ALT (SGPT) 17 1 - 33 U/L    AST (SGOT) 18 1 - 32 U/L    Alkaline Phosphatase 76 39 - 117 U/L    Total Bilirubin 0.2 0.0 - 1.2 mg/dL    Globulin 2.8 gm/dL    A/G Ratio 1.6 g/dL    BUN/Creatinine Ratio 21.6 7.0 - 25.0    Anion Gap 10.0 5.0 - 15.0 mmol/L    eGFR 103.1 >60.0 mL/min/1.73   Protime-INR    Specimen: Blood   Result Value Ref Range    Protime 10.2 9.6 - 11.7 Seconds    INR 0.93 0.93 - 1.10   aPTT    Specimen: Blood   Result Value Ref Range    PTT 24.9 (L) 61.0 - 76.5 seconds   High Sensitivity Troponin T    Specimen: Blood   Result Value Ref Range    HS Troponin T <6 <14 ng/L   CBC Auto Differential    Specimen: Blood   Result Value Ref Range    WBC 8.70 3.40 - 10.80 10*3/mm3    RBC 4.63 3.77 - 5.28 10*6/mm3    Hemoglobin 13.6 12.0 - 15.9 g/dL     Hematocrit 41.6 34.0 - 46.6 %    MCV 89.7 79.0 - 97.0 fL    MCH 29.3 26.6 - 33.0 pg    MCHC 32.7 31.5 - 35.7 g/dL    RDW 14.1 12.3 - 15.4 %    RDW-SD 44.2 37.0 - 54.0 fl    MPV 8.4 6.0 - 12.0 fL    Platelets 387 140 - 450 10*3/mm3    Neutrophil % 42.5 (L) 42.7 - 76.0 %    Lymphocyte % 46.3 (H) 19.6 - 45.3 %    Monocyte % 7.9 5.0 - 12.0 %    Eosinophil % 2.2 0.3 - 6.2 %    Basophil % 1.1 0.0 - 1.5 %    Neutrophils, Absolute 3.70 1.70 - 7.00 10*3/mm3    Lymphocytes, Absolute 4.00 (H) 0.70 - 3.10 10*3/mm3    Monocytes, Absolute 0.70 0.10 - 0.90 10*3/mm3    Eosinophils, Absolute 0.20 0.00 - 0.40 10*3/mm3    Basophils, Absolute 0.10 0.00 - 0.20 10*3/mm3    nRBC 0.1 0.0 - 0.2 /100 WBC   Scan Slide    Specimen: Blood   Result Value Ref Range    RBC Morphology Normal Normal    WBC Morphology Normal Normal    Platelet Estimate Adequate Normal   ECG 12 Lead Chest Pain   Result Value Ref Range    QT Interval 336 ms    QTC Interval 425 ms        CT Angiogram Chest    Result Date: 2/28/2024  Impression: No evidence of pulmonary embolism. Mild lingular and left basilar atelectasis. Electronically Signed: Naveen Alejo MD  2/28/2024 4:35 AM EST  Workstation ID: [id number]          Medical Decision Making  Patient was seen evaluated for the above problem    Differential diagnosis includes but is not limited to ACS, PE, dissection, pneumothorax,    Patient is having pain in her chest and down her arm making her arm numb and tingle.  Her initial EKG does not show any acute changes.  See below.  Troponin is negative first set.  She was feeling pain straight through to her back.  CT chest shows no signs of aneurysm.  No pulmonary embolism.  No dissection noted.  Patient does have strong family history and with chest pain midsternal rating down her left arm will placed in the hospital for further cardiac monitoring and stress testing.  Patient verbalized understanding is okay this.  Spoke with Gali on-call for the hospitalist who agrees  to admit.    Problems Addressed:  Chest pain, unspecified type: complicated acute illness or injury    Amount and/or Complexity of Data Reviewed  Labs: ordered. Decision-making details documented in ED Course.     Details: Labs reviewed by myself  Radiology: ordered and independent interpretation performed.     Details: CT scan reviewed by myself as  ECG/medicine tests: ordered and independent interpretation performed.     Details: EKG interpreted by myself shows sinus rate of 96    Risk  Prescription drug management.  Decision regarding hospitalization.        Final diagnoses:   None   Chest pain      ED Disposition  ED Disposition       None            No follow-up provider specified.       Medication List      No changes were made to your prescriptions during this visit.            Harry Nielsen DO  02/28/24 [id number]

## 2024-02-28 NOTE — PLAN OF CARE
Goal Outcome Evaluation:            Pt ok to d/c and follow up with [facility] in 2 weeks

## 2024-02-28 NOTE — OUTREACH NOTE
Prep Survey      Flowsheet Row Responses   [facility] patient discharged from? [facility]   Is LACE score < 7 ? Yes   Eligibility [facility]   Date of Admission 02/28/24   Date of Discharge 02/28/24   Discharge Disposition Home or Self Care   Discharge diagnosis chest pain   Does the patient have one of the following disease processes/diagnoses(primary or secondary)? Other   Does the patient have Home health ordered? No   Is there a DME ordered? No   Prep survey completed? Yes            Kaleigh ADAMS - Registered Nurse

## 2024-02-28 NOTE — DISCHARGE SUMMARY
[facility] Medicine Services  Discharge Summary    Date of Service: 24  Patient Name: Suzie Duvall  : 1980  MRN: [id number]    Date of Admission: 2024  Discharge Diagnosis:    Diagnosis Plan   1. Chest pain, unspecified type            Date of Discharge:  24  Primary Care Physician: Kaylene Schroeder MD      Presenting Problem:   Chest pain [R07.9]  Chest pain, unspecified type [R07.9]    Active and Resolved Hospital Problems:  Active Hospital Problems    Diagnosis POA    **Chest pain [R07.9] Yes    GERD (gastroesophageal reflux disease) [K21.9] Yes    Anxiety associated with depression [F41.8] Yes    Prediabetes [R73.03] Yes    Obesity (BMI 30-39.9) [E66.9] Yes      Resolved Hospital Problems   No resolved problems to display.         Hospital Course     Hospital Course:    Suzie Duvall is a 43 y.o. female who presents with history of medical problems was admitted to the hospital with history of substernal sharp pain without any radiation of the discomfort into the neck or into the arms.  It did radiate into the scapular area.  Denies having any heaviness shortness of breath sweating nausea vomiting.  No other associated aggravating or alleviating factors.  EKG showed no acute changes.  Troponin levels are negative.  Cardiology consultation was requested..Stress Cardiolite test  Echocardiogram     Dyslipidemia-low-cholesterol low-fat diet.  Start Lipitor.     Medications were reviewed and updated.    Dyslipidemia  Total cholesterol 208 and -2024.  Low-cholesterol low-fat diet.  Start statin.  DISCHARGE Follow Up Recommendations for labs and diagnostics:       Reasons For Change In Medications and Indications for New Medications:      Day of Discharge     Vital Signs:  Temp:  [97.8 °F (36.6 °C)-98.1 °F (36.7 °C)] 98.1 °F (36.7 °C)  Heart Rate:  [] 94  Resp:  [18] 18  BP: (107-146)/(76-97) 107/76    Physical Exam:  Physical Exam   GENERAL: The  patient is well developed and nontoxic.  HEENT: Nonicteric sclerae, PERRLA, EOMI. Oropharynx clear. Moist mucous membranes. Conjunctivae appear well perfused.  CHEST: Chest wall is nontender.  HEART: Regular rate and rhythm without murmurs. No MRG  LUNGS: Clear to auscultation bilaterally. No WRR  ABDOMEN: Soft, positive bowel sounds, nontender, no organomegaly.  RECTAL: Deferred.  SKIN: No rash, no excessive bruising, petechiae, or purpura.  NEUROLOGIC: Cranial nerves II-XII intact without motor/sensory deficit       Pertinent  and/or Most Recent Results     LAB RESULTS:      Lab 02/28/24  0250   WBC 8.70   HEMOGLOBIN 13.6   HEMATOCRIT 41.6   PLATELETS 387   NEUTROS ABS 3.70   LYMPHS ABS 4.00*   MONOS ABS 0.70   EOS ABS 0.20   MCV 89.7   PROTIME 10.2   APTT 24.9*         Lab 02/28/24  0524 02/28/24  0250   SODIUM  --  139   POTASSIUM  --  4.2   CHLORIDE  --  106   CO2  --  23.0   ANION GAP  --  10.0   BUN  --  16   CREATININE  --  0.74   EGFR  --  103.1   GLUCOSE  --  122*   CALCIUM  --  9.6   HEMOGLOBIN A1C 5.40  --          Lab 02/28/24  0250   TOTAL PROTEIN 7.2   ALBUMIN 4.4   GLOBULIN 2.8   ALT (SGPT) 17   AST (SGOT) 18   BILIRUBIN 0.2   ALK PHOS 76         Lab 02/28/24  0524 02/28/24  0250   HSTROP T <6 <6   PROTIME  --  10.2   INR  --  0.93         Lab 02/28/24 0524   CHOLESTEROL 208*   LDL CHOL 136*   HDL CHOL 51   TRIGLYCERIDES 120             Brief Urine Lab Results       None          Microbiology Results (last 10 days)       ** No results found for the last 240 hours. **            CT Angiogram Chest    Result Date: 2/28/2024  Impression: Impression: No evidence of pulmonary embolism. Mild lingular and left basilar atelectasis. Electronically Signed: Naveen Alejo MD  2/28/2024 4:35 AM EST  Workstation ID: [id number]             Results for orders placed during the hospital encounter of 02/28/24    Adult Transthoracic Echo Complete W/ Cont if Necessary Per Protocol    Interpretation Summary    Left  ventricular systolic function is normal. Calculated left ventricular EF = 57.6% Left ventricular ejection fraction appears to be 56 - 60%.    Indications  Chest pain    Technically satisfactory study.  Mitral valve is structurally normal.  Tricuspid valve is structurally normal.  Aortic valve is structurally normal.  Pulmonic valve could not be well visualized.  No evidence for mitral tricuspid or aortic regurgitation is seen by Doppler study.  Left atrium is normal in size.  Right atrium is normal in size.  Left ventricle is normal in size and contractility with ejection fraction of 60%.  No evidence for diastolic dysfunction is present.  Peak systolic left ventricular longitudinal strain is normal at -19%.  Right ventricle is normal in size and contractility with TAPSE of 1.8 cm..  Atrial septum is intact.  Aorta is normal.  No pericardial effusion or intracardiac thrombus is seen.    Impression  Structurally and functionally normal cardiac valves.  Left ventricular size and contractility is normal with ejection fraction of 60%.      Labs Pending at Discharge:      Procedures Performed           Consults:   Consults       Date and Time Order Name Status Description    2/28/2024  7:34 AM Inpatient Cardiology Consult                Discharge Details        Discharge Medications        New Medications        Instructions Start Date   atorvastatin 10 MG tablet  Commonly known as: LIPITOR   10 mg, Oral, Nightly             Continue These Medications        Instructions Start Date   cholecalciferol 25 MCG (1000 UT) tablet  Commonly known as: VITAMIN D3   2,000 Units, Oral, Daily      meloxicam 15 MG tablet  Commonly known as: MOBIC   15 mg, Oral, Daily      pantoprazole 20 MG EC tablet  Commonly known as: Protonix   20 mg, Oral, Daily      sertraline 50 MG tablet  Commonly known as: ZOLOFT   50 mg, Oral, Daily               No Known Allergies      Discharge Disposition:  Home or Self Care    Diet:  Hospital:  Diet Order    Procedures    NPO Diet NPO Type: Ice Chips    Diet: Regular/House Diet; Texture: Regular Texture (IDDSI 7); Fluid Consistency: Thin (IDDSI 0)         Discharge Activity:         CODE STATUS:  There are no questions and answers to display.         Future Appointments   Date Time Provider Department Center   8/16/2024  1:15 PM Kaylene Schroeder MD [facility]           Time spent on Discharge including face to face service:  >30 minutes    Signature: Electronically signed by Tex Serra MD, 02/28/24, 12:36 EST.  [facility] Hospitalist Team

## 2024-02-28 NOTE — ED NOTES
Nursing report ED to floor  Suzie Duvall  43 y.o.  female    HPI:   Chief Complaint   Patient presents with    Shortness of Breath       Admitting doctor:   Penelope Whipple MD    Admitting diagnosis:   The encounter diagnosis was Chest pain, unspecified type.    Code status:   Current Code Status       Date Active Code Status Order ID Comments User Context       Prior            Allergies:   Patient has no known allergies.    Isolation:  No active isolations     Fall Risk:  Fall Risk Assessment was completed, and patient is at low risk for falls.   Predictive Model Details         2 (Low) Factor Value    Calculated 2/28/2024 06:13 Age 43    Risk of Fall Model Musculoskeletal Assessment WDL     Active Peripheral IV Present     Imaging order in this encounter Present     Drug Use Not Asked     Respiratory Rate 18     Skin Assessment WDL     Financial Class Other     Magnesium not on file     Number of Distinct Medication Classes administered 3     Inocencio Scale not on file     Diastolic BP 76     Peripheral Vascular Assessment WDL     Cardiac Assessment X     Gastrointestinal Assessment X     Days after Admission 0.169     Chloride 106 mmol/L     Albumin 4.4 g/dL     ALT 17 U/L     Potassium 4.2 mmol/L     Calcium 9.6 mg/dL     Total Bilirubin 0.2 mg/dL     Creatinine 0.74 mg/dL         Weight:       02/28/24  0146   Weight: 88 kg (194 lb 0.1 oz)       Intake and Output  No intake or output data in the 24 hours ending 02/28/24 0614    Diet:   Dietary Orders (From admission, onward)       Start     Ordered    02/29/24 0001  NPO Diet NPO Type: Ice Chips  Diet Effective Midnight        Question:  NPO Type  Answer:  Ice Chips    02/28/24 0454                     Most recent vitals:   Vitals:    02/28/24 0215 02/28/24 0301 02/28/24 0516 02/28/24 0601   BP: 122/87 124/87 133/90 107/76   BP Location:       Patient Position:       Pulse: 90 95 93 94   Resp:       Temp:       TempSrc:       SpO2: 98% 94% 95% 94%   Weight:        Height:           Active LDAs/IV Access:   Lines, Drains & Airways       Active LDAs       Name Placement date Placement time Site Days    Peripheral IV 02/28/24 0250 Left Antecubital 02/28/24 0250  Antecubital  less than 1                    Skin Condition:   Skin Assessments (last day)       None             Labs (abnormal labs have a star):   Labs Reviewed   COMPREHENSIVE METABOLIC PANEL - Abnormal; Notable for the following components:       Result Value    Glucose 122 (*)     All other components within normal limits    Narrative:     GFR Normal >60  Chronic Kidney Disease <60  Kidney Failure <15     APTT - Abnormal; Notable for the following components:    PTT 24.9 (*)     All other components within normal limits   CBC WITH AUTO DIFFERENTIAL - Abnormal; Notable for the following components:    Neutrophil % 42.5 (*)     Lymphocyte % 46.3 (*)     Lymphocytes, Absolute 4.00 (*)     All other components within normal limits   LIPID PANEL - Abnormal; Notable for the following components:    Total Cholesterol 208 (*)     LDL Cholesterol  136 (*)     All other components within normal limits    Narrative:     Cholesterol Reference Ranges  (U.S. Department of Health and Human Services ATP III Classifications)    Desirable          <200 mg/dL  Borderline High    200-239 mg/dL  High Risk          >240 mg/dL      Triglyceride Reference Ranges  (U.S. Department of Health and Human Services ATP III Classifications)    Normal           <150 mg/dL  Borderline High  150-199 mg/dL  High             200-499 mg/dL  Very High        >500 mg/dL    HDL Reference Ranges  (U.S. Department of Health and Human Services ATP III Classifications)    Low     <40 mg/dl (major risk factor for CHD)  High    >60 mg/dl ('negative' risk factor for CHD)        LDL Reference Ranges  (U.S. Department of Health and Human Services ATP III Classifications)    Optimal          <100 mg/dL  Near Optimal     100-129 mg/dL  Borderline High  130-159  mg/dL  High             160-189 mg/dL  Very High        >189 mg/dL   PROTIME-INR - Normal   TROPONIN - Normal    Narrative:     High Sensitive Troponin T Reference Range:  <14.0 ng/L- Negative Female for AMI  <22.0 ng/L- Negative Male for AMI  >=14 - Abnormal Female indicating possible myocardial injury.  >=22 - Abnormal Male indicating possible myocardial injury.   Clinicians would have to utilize clinical acumen, EKG, Troponin, and serial changes to determine if it is an Acute Myocardial Infarction or myocardial injury due to an underlying chronic condition.        TROPONIN - Normal    Narrative:     High Sensitive Troponin T Reference Range:  <14.0 ng/L- Negative Female for AMI  <22.0 ng/L- Negative Male for AMI  >=14 - Abnormal Female indicating possible myocardial injury.  >=22 - Abnormal Male indicating possible myocardial injury.   Clinicians would have to utilize clinical acumen, EKG, Troponin, and serial changes to determine if it is an Acute Myocardial Infarction or myocardial injury due to an underlying chronic condition.        HEMOGLOBIN A1C - Normal   SCAN SLIDE   CBC AND DIFFERENTIAL    Narrative:     The following orders were created for panel order CBC & Differential.  Procedure                               Abnormality         Status                     ---------                               -----------         ------                     CBC Auto Differential[[id number]]        Abnormal            Final result               Scan Slide[[id number]]                                       Final result                 Please view results for these tests on the individual orders.       LOC: Person, Place, Time, and Situation    Telemetry:  Med/Surg    Cardiac Monitoring Ordered: yes    EKG:   ECG 12 Lead Chest Pain   Preliminary Result   HEART RATE= 96  bpm   RR Interval= 624  ms   VT Interval= 163  ms   P Horizontal Axis= 30  deg   P Front Axis= 29  deg   QRSD Interval= 78  ms   QT Interval= 336  ms    QTcB= 425  ms   QRS Axis= -2  deg   T Wave Axis= 43  deg   - NORMAL ECG -   Sinus rhythm   When compared with ECG of 30-May-2020 10:02:19,   No significant change   Electronically Signed By:    Date and Time of Study: 2024-02-28 01:52:01          Medications Given in the ED:   Medications   sodium chloride 0.9 % flush 10 mL (has no administration in time range)   sodium chloride 0.9 % infusion (has no administration in time range)   pantoprazole (PROTONIX) injection 40 mg (has no administration in time range)   morphine injection 2 mg (has no administration in time range)   iopamidol (ISOVUE-370) 76 % injection 100 mL (100 mL Intravenous Given 2/28/24 9752)   aspirin chewable tablet 324 mg (324 mg Oral Given 2/28/24 9831)   nitroglycerin (NITROSTAT) ointment 0.5 inch (0.5 inches Topical Given 2/28/24 0579)       Imaging results:  CT Angiogram Chest    Result Date: 2/28/2024  Impression: No evidence of pulmonary embolism. Mild lingular and left basilar atelectasis. Electronically Signed: Naveen Alejo MD  2/28/2024 4:35 AM EST  Workstation ID: [id number]     Social issues:   Social History     Socioeconomic History    Marital status:    Tobacco Use    Smoking status: Never     Passive exposure: Never    Smokeless tobacco: Never   Vaping Use    Vaping Use: Never used   Substance and Sexual Activity    Alcohol use: Never    Drug use: Defer    Sexual activity: Yes     Partners: Male       NIH Stroke Scale:  Interval: (not recorded)  1a. Level of Consciousness: (not recorded)  1b. LOC Questions: (not recorded)  1c. LOC Commands: (not recorded)  2. Best Gaze: (not recorded)  3. Visual: (not recorded)  4. Facial Palsy: (not recorded)  5a. Motor Arm, Left: (not recorded)  5b. Motor Arm, Right: (not recorded)  6a. Motor Leg, Left: (not recorded)  6b. Motor Leg, Right: (not recorded)  7. Limb Ataxia: (not recorded)  8. Sensory: (not recorded)  9. Best Language: (not recorded)  10. Dysarthria: (not recorded)  11.  Extinction and Inattention (formerly Neglect): (not recorded)    Total (NIH Stroke Scale): (not recorded)     Additional notable assessment information:     Nursing report ED to floor:  [id number]    Corinne Ivan RN   02/28/24 06:14 EST

## 2024-02-28 NOTE — CONSULTS
Referring Provider: Tex Serra MD  Reason for Consultation:  Chest pain    Patient Care Team:  Kaylene Schroeder MD as PCP - General (Family Medicine)    Chief complaint  Chest pain    Subjective .     History of present illness:  Suzie Duvall is a 43 y.o. female who presents with history of medical problems was admitted to the hospital with history of substernal sharp pain without any radiation of the discomfort into the neck or into the arms.  It did radiate into the scapular area.  Denies having any heaviness shortness of breath sweating nausea vomiting.  No other associated aggravating or alleviating factors.  EKG showed no acute changes.  Troponin levels are negative.  Cardiology consultation was requested..    ROS      Patient is not having any shortness of breath, palpitations, dizziness or syncope.  Denies having any headache, abdominal pain, nausea, vomiting, diarrhea, constipation, loss of weight or loss of appetite.  Denies having any excessive bruising, hematuria or blood in the stool.    Review of all systems negative except as indicated      History  Past Medical History:   Diagnosis Date    Acute peritonitis 05/30/2020    Anxiety     Depression     GERD (gastroesophageal reflux disease)     Obesity (BMI 30-39.9) 06/02/2020       Past Surgical History:   Procedure Laterality Date    EXPLORATORY LAPAROTOMY N/A 5/30/2020    Procedure: LAPAROTOMY EXPLORATORY;  Surgeon: Scarlet Ruvalcaba MD;  Location: [facility] MAIN OR;  Service: General;  Laterality: N/A;       Family History   Problem Relation Age of Onset    Heart failure Mother     Diabetes Father        Social History     Tobacco Use    Smoking status: Never     Passive exposure: Never    Smokeless tobacco: Never   Vaping Use    Vaping Use: Never used   Substance Use Topics    Alcohol use: Never    Drug use: Defer        Medications Prior to Admission   Medication Sig Dispense Refill Last Dose    cholecalciferol (Vitamin D,  "Cholecalciferol,) 25 MCG (1000 UT) tablet Take 2 tablets by mouth Daily. 180 tablet 3 Past Month    meloxicam (MOBIC) 15 MG tablet Take 1 tablet by mouth Daily. 90 tablet 1 2/27/2024    pantoprazole (Protonix) 20 MG EC tablet Take 1 tablet by mouth Daily. 90 tablet 3 2/27/2024    sertraline (ZOLOFT) 50 MG tablet Take 1 tablet by mouth Daily. 90 tablet 3 2/27/2024         Patient has no known allergies.    Scheduled Meds:pantoprazole, 40 mg, Intravenous, Q AM      Continuous Infusions:sodium chloride, 100 mL/hr      PRN Meds:.  Morphine    [COMPLETED] Insert Peripheral IV **AND** sodium chloride    Objective     VITAL SIGNS  Vitals:    02/28/24 0301 02/28/24 0516 02/28/24 0601 02/28/24 0719   BP: 124/87 133/90 107/76    BP Location:       Patient Position:       Pulse: 95 93 94    Resp:    18   Temp:    98.1 °F (36.7 °C)   TempSrc:    Oral   SpO2: 94% 95% 94%    Weight:       Height:    162.6 cm (64\")       Flowsheet Rows      Flowsheet Row First Filed Value   Admission Height 162.6 cm (64\") Documented at 02/28/2024 0146   Admission Weight 88 kg (194 lb 0.1 oz) Documented at 02/28/2024 0146            No intake or output data in the 24 hours ending 02/28/24 0838     TELEMETRY: Sinus rhythm    Physical Exam:  The patient is alert, oriented and in no distress.  Vital signs as noted above.  Exogenous obesity (BMI 33)  Head and neck revealed no carotid bruits or jugular venous distention.  No thyromegaly or lymphadenopathy is present  Lungs clear.  No wheezing.  Breath sounds are normal bilaterally.  Heart normal first and second heart sounds. No murmur.  No precordial rub is present.  No gallop is present.  Abdomen soft and nontender.  No organomegaly is present.  Extremities with good peripheral pulses without any pedal edema.  Skin warm and dry.  Musculoskeletal system is grossly normal  CNS grossly normal    Reviewed and updated.    Results Review:   I reviewed the patient's new clinical results.  Lab Results (last " 24 hours)       Procedure Component Value Units Date/Time    Hemoglobin A1c [[id number]]  (Normal) Collected: 02/28/24 0524    Specimen: Blood Updated: 02/28/24 0608     Hemoglobin A1C 5.40 %     High Sensitivity Troponin T [[id number]]  (Normal) Collected: 02/28/24 0524    Specimen: Blood Updated: 02/28/24 0551     HS Troponin T <6 ng/L     Narrative:      High Sensitive Troponin T Reference Range:  <14.0 ng/L- Negative Female for AMI  <22.0 ng/L- Negative Male for AMI  >=14 - Abnormal Female indicating possible myocardial injury.  >=22 - Abnormal Male indicating possible myocardial injury.   Clinicians would have to utilize clinical acumen, EKG, Troponin, and serial changes to determine if it is an Acute Myocardial Infarction or myocardial injury due to an underlying chronic condition.         Lipid Panel [[id number]]  (Abnormal) Collected: 02/28/24 0524    Specimen: Blood Updated: 02/28/24 0551     Total Cholesterol 208 mg/dL      Triglycerides 120 mg/dL      HDL Cholesterol 51 mg/dL      LDL Cholesterol  136 mg/dL      VLDL Cholesterol 21 mg/dL      LDL/HDL Ratio 2.61    Narrative:      Cholesterol Reference Ranges  (U.S. Department of Health and Human Services ATP III Classifications)    Desirable          <200 mg/dL  Borderline High    200-239 mg/dL  High Risk          >240 mg/dL      Triglyceride Reference Ranges  (U.S. Department of Health and Human Services ATP III Classifications)    Normal           <150 mg/dL  Borderline High  150-199 mg/dL  High             200-499 mg/dL  Very High        >500 mg/dL    HDL Reference Ranges  (U.S. Department of Health and Human Services ATP III Classifications)    Low     <40 mg/dl (major risk factor for CHD)  High    >60 mg/dl ('negative' risk factor for CHD)        LDL Reference Ranges  (U.S. Department of Health and Human Services ATP III Classifications)    Optimal          <100 mg/dL  Near Optimal     100-129 mg/dL  Borderline High  130-159 mg/dL  High              160-189 mg/dL  Very High        >189 mg/dL    CBC & Differential [[id number]]  (Abnormal) Collected: 02/28/24 0250    Specimen: Blood Updated: 02/28/24 0340    Narrative:      The following orders were created for panel order CBC & Differential.  Procedure                               Abnormality         Status                     ---------                               -----------         ------                     CBC Auto Differential[[id number]]        Abnormal            Final result               Scan Slide[[id number]]                                       Final result                 Please view results for these tests on the individual orders.    CBC Auto Differential [[id number]]  (Abnormal) Collected: 02/28/24 0250    Specimen: Blood Updated: 02/28/24 0340     WBC 8.70 10*3/mm3      RBC 4.63 10*6/mm3      Hemoglobin 13.6 g/dL      Hematocrit 41.6 %      MCV 89.7 fL      MCH 29.3 pg      MCHC 32.7 g/dL      RDW 14.1 %      RDW-SD 44.2 fl      MPV 8.4 fL      Platelets 387 10*3/mm3      Neutrophil % 42.5 %      Lymphocyte % 46.3 %      Monocyte % 7.9 %      Eosinophil % 2.2 %      Basophil % 1.1 %      Neutrophils, Absolute 3.70 10*3/mm3      Lymphocytes, Absolute 4.00 10*3/mm3      Monocytes, Absolute 0.70 10*3/mm3      Eosinophils, Absolute 0.20 10*3/mm3      Basophils, Absolute 0.10 10*3/mm3      nRBC 0.1 /100 WBC     Scan Slide [[id number]] Collected: 02/28/24 0250    Specimen: Blood Updated: 02/28/24 0340     RBC Morphology Normal     WBC Morphology Normal     Platelet Estimate Adequate    Comprehensive Metabolic Panel [[id number]]  (Abnormal) Collected: 02/28/24 0250    Specimen: Blood Updated: 02/28/24 0325     Glucose 122 mg/dL      BUN 16 mg/dL      Creatinine 0.74 mg/dL      Sodium 139 mmol/L      Potassium 4.2 mmol/L      Comment: Slight hemolysis detected by analyzer. Result may be falsely elevated.        Chloride 106 mmol/L      CO2 23.0 mmol/L      Calcium 9.6 mg/dL      Total Protein 7.2 g/dL       Albumin 4.4 g/dL      ALT (SGPT) 17 U/L      AST (SGOT) 18 U/L      Alkaline Phosphatase 76 U/L      Total Bilirubin 0.2 mg/dL      Globulin 2.8 gm/dL      A/G Ratio 1.6 g/dL      BUN/Creatinine Ratio 21.6     Anion Gap 10.0 mmol/L      eGFR 103.1 mL/min/1.73     Narrative:      GFR Normal >60  Chronic Kidney Disease <60  Kidney Failure <15      High Sensitivity Troponin T [[id number]]  (Normal) Collected: 02/28/24 0250    Specimen: Blood Updated: 02/28/24 0325     HS Troponin T <6 ng/L     Narrative:      High Sensitive Troponin T Reference Range:  <14.0 ng/L- Negative Female for AMI  <22.0 ng/L- Negative Male for AMI  >=14 - Abnormal Female indicating possible myocardial injury.  >=22 - Abnormal Male indicating possible myocardial injury.   Clinicians would have to utilize clinical acumen, EKG, Troponin, and serial changes to determine if it is an Acute Myocardial Infarction or myocardial injury due to an underlying chronic condition.         Protime-INR [[id number]]  (Normal) Collected: 02/28/24 0250    Specimen: Blood Updated: 02/28/24 0313     Protime 10.2 Seconds      INR 0.93    aPTT [[id number]]  (Abnormal) Collected: 02/28/24 0250    Specimen: Blood Updated: 02/28/24 0313     PTT 24.9 seconds             Imaging Results (Last 24 Hours)       Procedure Component Value Units Date/Time    CT Angiogram Chest [[id number]] Collected: 02/28/24 0432     Updated: 02/28/24 0437    Narrative:      CT ANGIOGRAM CHEST    Date of Exam: 2/28/2024 3:40 AM EST    Indication: cp.    Comparison: None available.    Technique: CTA of the chest was performed after the uneventful intravenous administration of iodinated contrast. Reconstructed coronal and sagittal images were also obtained. In addition, a 3-D volume rendered image was created for interpretation.   Automated exposure control and iterative reconstruction methods were used.      Findings:    Pulmonary arteries: Adequate opacification of the pulmonary arteries. No  evidence of acute pulmonary embolism.    Lungs and Pleura: There is mild lingular and left basilar atelectasis.    Mediastinum/Rita: No mediastinal or hilar lymphadenopathy.    Lymph nodes: No axillary or supraclavicular adenopathy.    Cardiovascular: The cardiac chambers are within normal limits. The pericardium is normal. The aorta and its arch branch vessels are unremarkable.       Upper Abdomen: There is a stable right renal cyst. Otherwise, the upper abdominal contents are unremarkable.          Bones and Soft Tissue: No suspicious osseous lesion.        Impression:      Impression:  No evidence of pulmonary embolism. Mild lingular and left basilar atelectasis.        Electronically Signed: Naveen Alejo MD    2/28/2024 4:35 AM EST    Workstation ID: [id number]        LAB RESULTS (LAST 7 DAYS)    CBC  Results from last 7 days   Lab Units 02/28/24  0250   WBC 10*3/mm3 8.70   RBC 10*6/mm3 4.63   HEMOGLOBIN g/dL 13.6   HEMATOCRIT % 41.6   MCV fL 89.7   PLATELETS 10*3/mm3 387       BMP  Results from last 7 days   Lab Units 02/28/24  0250   SODIUM mmol/L 139   POTASSIUM mmol/L 4.2   CHLORIDE mmol/L 106   CO2 mmol/L 23.0   BUN mg/dL 16   CREATININE mg/dL 0.74   GLUCOSE mg/dL 122*       CMP   Results from last 7 days   Lab Units 02/28/24  0250   SODIUM mmol/L 139   POTASSIUM mmol/L 4.2   CHLORIDE mmol/L 106   CO2 mmol/L 23.0   BUN mg/dL 16   CREATININE mg/dL 0.74   GLUCOSE mg/dL 122*   ALBUMIN g/dL 4.4   BILIRUBIN mg/dL 0.2   ALK PHOS U/L 76   AST (SGOT) U/L 18   ALT (SGPT) U/L 17         BNP        TROPONIN  Results from last 7 days   Lab Units 02/28/24  0524   HSTROP T ng/L <6       CoAg  Results from last 7 days   Lab Units 02/28/24  0250   INR  0.93   APTT seconds 24.9*       Creatinine Clearance  Estimated Creatinine Clearance: 105.2 mL/min (by C-G formula based on SCr of 0.74 mg/dL).    ABG        Radiology  CT Angiogram Chest    Result Date: 2/28/2024  Impression: No evidence of pulmonary embolism. Mild lingular  and left basilar atelectasis. Electronically Signed: Naveen Alejo MD  2/28/2024 4:35 AM EST  Workstation ID: [id number]       EKG            I personally viewed and interpreted the patient's EKG/Telemetry data: Sinus rhythm without ischemic changes    ECHOCARDIOGRAM:        STRESS TEST        Cardiolite (Tc-99m sestamibi) stress test    HEART CATHETERIZATION  No results found for this or any previous visit.      OTHER:     Assessment & Plan     Principal Problem:    Chest pain  Active Problems:    Obesity (BMI 30-39.9)    Prediabetes    Anxiety associated with depression    GERD (gastroesophageal reflux disease)      ]]]]]]]]]]]]]]]]]]]]]]]]  Impression   ===========  -Chest pain-atypical.  Troponin levels are negative.  EKG showed no acute changes.    - Dyslipidemia  Total cholesterol 208 and -2/28/2024.  Low-cholesterol low-fat diet.  Start statin.    - GERD anxiety depression prediabetes    - Exogenous obesity    - Family history of coronary artery disease    - Non-smoker    - No known allergies  =============    Plan   ===========  Stress Cardiolite test  Echocardiogram    Dyslipidemia-low-cholesterol low-fat diet.  Start Lipitor.    Medications were reviewed and updated.    Further plan will depend on patient's progress.    ]]]]]]]]]]]]]]]]]]]      Sanjana Garcia MD  02/28/24  08:38 EST

## 2024-02-28 NOTE — CASE MANAGEMENT/SOCIAL WORK
Case Management Discharge Note      Final Note: DC prior to CM assessment         Transportation Services  Private: Car    Final Discharge Disposition Code: 01 - home or self-care

## 2024-02-29 ENCOUNTER — TRANSITIONAL CARE MANAGEMENT TELEPHONE ENCOUNTER (OUTPATIENT)
Dept: CALL CENTER | Facility: HOSPITAL | Age: 44
End: 2024-02-29
Payer: MEDICARE

## 2024-02-29 NOTE — OUTREACH NOTE
Call Center TCM Note      Flowsheet Row Responses   [facility] patient discharged from? [facility]   Does the patient have one of the following disease processes/diagnoses(primary or secondary)? Other   TCM attempt successful? No   Unsuccessful attempts Attempt 1   Revoked Reason Phone Issues  [Disconnected numbers]            Bertin Auguste RN    2/29/2024, 11:28 EST        
37.8

## 2024-03-01 ENCOUNTER — TELEPHONE (OUTPATIENT)
Dept: CARDIOLOGY | Facility: CLINIC | Age: 44
End: 2024-03-01
Payer: MEDICARE

## 2024-03-01 NOTE — TELEPHONE ENCOUNTER
DELETE AFTER REVIEWING: Telephone encounter to be sent to the clinical pool     Caller: Suzie Duvall    Relationship to patient: Self    Best call back number:[phone]    Chief complaint: NONE    Type of visit: HOSPITAL FOLLOW UP    Requested date: PATIENT IS SAYING 2 WEEK FOLLOW UP WITH DR LINDSEY, I DIDN'T FIND NOTES IN DISCHARGE. PLEASE CALL PATIENT BACK TO SCHEDULE.

## 2024-03-04 NOTE — TELEPHONE ENCOUNTER
I tried returning pt's call but when you try calling pt's mobile number listed in chart [phone] you get a message stating your call cannot be completed. Unable to contact pt to schedule follow up appointment.

## 2024-03-06 NOTE — PROGRESS NOTES
Encounter Date:03/13/2024    Last seen 2/28/2024 (in hospital)      Patient ID: Suzie Duvall is a 43 y.o. female.    Chief complaint  Chest pain  Dyslipidemia  Prediabetes    History of present illness patient recently was admitted to [facility] with chest pain.  Myocardial infarction was ruled out.  Patient subsequently had stress Myoview and echocardiogram and was released home.    Since I have last seen, the patient has been without any chest discomfort ,shortness of breath, palpitations, dizziness or syncope.  Denies having any headache ,abdominal pain ,nausea, vomiting , diarrhea constipation, loss of weight or loss of appetite.  Denies having any excessive bruising ,hematuria or blood in the stool.    Review of all systems negative except as indicated.    Reviewed ROS.    ]]]]]]]]]]]]]]]]]]]]]]]]  Impression   ===========  -Chest pain-atypical.  Troponin levels are negative.  EKG showed no acute changes.    Stress Cardiolite test-2/28/2024-normal  Echocardiogram-2/28/2024-normal     - Dyslipidemia  Total cholesterol 208 and -2/28/2024.  Low-cholesterol low-fat diet.  Patient is on statin (atorvastatin     - GERD anxiety depression prediabetes     - Exogenous obesity     - Family history of coronary artery disease     - Non-smoker     - No known allergies  =============     Plan   ===========  Chest pain-improved    Stress Cardiolite test-2/28/2024-normal  Echocardiogram-2/28/2024-normal     Dyslipidemia-low-cholesterol low-fat diet.  Continue atorvastatin  Primary care to follow lipid panels.     Medications were reviewed and updated.    Follow-up in the office in 6 months.     Further plan will depend on patient's progress.    Reviewed and updated-3/13/2024.  ]]]]]]]]]]]]]]]]]]]              Diagnosis Plan   1. Dyslipidemia        2. Chest discomfort        LAB RESULTS (LAST 7 DAYS)    CBC        BMP        CMP         BNP        TROPONIN        CoAg        Creatinine  Clearance  Estimated Creatinine Clearance: 105.2 mL/min (by C-G formula based on SCr of 0.74 mg/dL).    ABG        Radiology  No radiology results for the last day                The following portions of the patient's history were reviewed and updated as appropriate: allergies, current medications, past family history, past medical history, past social history, past surgical history, and problem list.    Review of Systems   Constitutional: Negative for malaise/fatigue.   Cardiovascular:  Positive for chest pain and leg swelling (FEET). Negative for palpitations and syncope.   Respiratory:  Positive for shortness of breath.    Skin:  Negative for rash.   Gastrointestinal:  Negative for nausea and vomiting.   Neurological:  Negative for dizziness, light-headedness and numbness.   All other systems reviewed and are negative.        Current Outpatient Medications:     atorvastatin (LIPITOR) 10 MG tablet, Take 1 tablet by mouth Every Night., Disp: 90 tablet, Rfl: 0    cholecalciferol (Vitamin D, Cholecalciferol,) 25 MCG (1000 UT) tablet, Take 2 tablets by mouth Daily., Disp: 180 tablet, Rfl: 3    meloxicam (MOBIC) 15 MG tablet, Take 1 tablet by mouth Daily., Disp: 90 tablet, Rfl: 1    pantoprazole (Protonix) 20 MG EC tablet, Take 1 tablet by mouth Daily., Disp: 90 tablet, Rfl: 3    sertraline (ZOLOFT) 50 MG tablet, Take 1 tablet by mouth Daily., Disp: 90 tablet, Rfl: 3    No Known Allergies    Family History   Problem Relation Age of Onset    Heart failure Mother     Diabetes Father        Past Surgical History:   Procedure Laterality Date    EXPLORATORY LAPAROTOMY N/A 5/30/2020    Procedure: LAPAROTOMY EXPLORATORY;  Surgeon: Scarlet Ruvalcaba MD;  Location: [facility] MAIN OR;  Service: General;  Laterality: N/A;       Past Medical History:   Diagnosis Date    Acute peritonitis 05/30/2020    Anxiety     Depression     GERD (gastroesophageal reflux disease)     Obesity (BMI 30-39.9) 06/02/2020       Family History   Problem  "Relation Age of Onset    Heart failure Mother     Diabetes Father        Social History     Socioeconomic History    Marital status:    Tobacco Use    Smoking status: Never     Passive exposure: Never    Smokeless tobacco: Never   Vaping Use    Vaping status: Never Used   Substance and Sexual Activity    Alcohol use: Never    Drug use: Defer    Sexual activity: Yes     Partners: Male         Procedures      Objective:       Physical Exam    /76 (BP Location: Right arm, Patient Position: Sitting, Cuff Size: Large Adult)   Pulse 91   Ht 162.6 cm (64\")   Wt 88 kg (194 lb)   LMP 01/28/2024 (Approximate)   SpO2 98% Comment: RA  BMI 33.30 kg/m²   The patient is alert, oriented and in no distress.    Vital signs as noted above.    Head and neck revealed no carotid bruits or jugular venous distension.  No thyromegaly or lymphadenopathy is present.    Lungs clear.  No wheezing.  Breath sounds are normal bilaterally.    Heart normal first and second heart sounds.  No murmur..  No pericardial rub is present.  No gallop is present.    Abdomen soft and nontender.  No organomegaly is present.    Extremities revealed good peripheral pulses without any pedal edema.    Skin warm and dry.    Musculoskeletal system is grossly normal.    CNS grossly normal.    Reviewed and updated.        "

## 2024-03-13 ENCOUNTER — OFFICE VISIT (OUTPATIENT)
Dept: CARDIOLOGY | Facility: CLINIC | Age: 44
End: 2024-03-13
Payer: MEDICARE

## 2024-03-13 VITALS
SYSTOLIC BLOOD PRESSURE: 127 MMHG | OXYGEN SATURATION: 98 % | DIASTOLIC BLOOD PRESSURE: 76 MMHG | WEIGHT: 194 LBS | BODY MASS INDEX: 33.12 KG/M2 | HEART RATE: 91 BPM | HEIGHT: 64 IN

## 2024-03-13 DIAGNOSIS — E78.5 DYSLIPIDEMIA: Primary | ICD-10-CM

## 2024-03-13 DIAGNOSIS — R07.89 CHEST DISCOMFORT: ICD-10-CM

## 2024-03-13 PROCEDURE — 1160F RVW MEDS BY RX/DR IN RCRD: CPT | Performed by: INTERNAL MEDICINE

## 2024-03-13 PROCEDURE — 1159F MED LIST DOCD IN RCRD: CPT | Performed by: INTERNAL MEDICINE

## 2024-03-13 PROCEDURE — 99214 OFFICE O/P EST MOD 30 MIN: CPT | Performed by: INTERNAL MEDICINE

## 2024-06-25 RX ORDER — ATORVASTATIN CALCIUM 10 MG/1
10 TABLET, FILM COATED ORAL NIGHTLY
Qty: 90 TABLET | Refills: 1 | Status: SHIPPED | OUTPATIENT
Start: 2024-06-25

## 2024-06-25 NOTE — TELEPHONE ENCOUNTER
Caller: Suzie Duvall    Relationship: Self    Best call back number: [phone]     Requested Prescriptions:   Requested Prescriptions     Pending Prescriptions Disp Refills    atorvastatin (LIPITOR) 10 MG tablet 90 tablet 0     Sig: Take 1 tablet by mouth Every Night.        Pharmacy where request should be sent: [id number] - [place], [place] [address] - [phone]  - [phone]     Last office visit with prescribing clinician: 2/16/2024   Last telemedicine visit with prescribing clinician: Visit date not found   Next office visit with prescribing clinician: 8/16/2024     Does the patient have less than a 3 day supply:  [x] Yes  [] No  Jamia Vang   06/25/24 12:21 EDT

## 2024-09-20 DIAGNOSIS — M79.605 PAIN IN BOTH LOWER EXTREMITIES: ICD-10-CM

## 2024-09-20 DIAGNOSIS — M79.604 PAIN IN BOTH LOWER EXTREMITIES: ICD-10-CM

## 2024-09-20 RX ORDER — MELOXICAM 15 MG/1
15 TABLET ORAL DAILY
Qty: 90 TABLET | Refills: 1 | Status: SHIPPED | OUTPATIENT
Start: 2024-09-20

## 2025-02-19 RX ORDER — ATORVASTATIN CALCIUM 10 MG/1
10 TABLET, FILM COATED ORAL
Qty: 90 TABLET | Refills: 0 | Status: SHIPPED | OUTPATIENT
Start: 2025-02-19

## 2025-03-05 ENCOUNTER — HOSPITAL ENCOUNTER (EMERGENCY)
Facility: HOSPITAL | Age: 45
Discharge: LEFT BEFORE DISCHARGE | End: 2025-03-05
Attending: EMERGENCY MEDICINE
Payer: MEDICARE

## 2025-03-05 VITALS
SYSTOLIC BLOOD PRESSURE: 159 MMHG | OXYGEN SATURATION: 99 % | WEIGHT: 200 LBS | TEMPERATURE: 98.4 F | DIASTOLIC BLOOD PRESSURE: 113 MMHG | BODY MASS INDEX: 34.15 KG/M2 | HEART RATE: 109 BPM | RESPIRATION RATE: 20 BRPM | HEIGHT: 64 IN

## 2025-03-05 PROCEDURE — 99211 OFF/OP EST MAY X REQ PHY/QHP: CPT | Performed by: EMERGENCY MEDICINE

## 2025-04-17 ENCOUNTER — TELEPHONE (OUTPATIENT)
Dept: FAMILY MEDICINE CLINIC | Facility: CLINIC | Age: 45
End: 2025-04-17
Payer: MEDICARE

## 2025-04-28 ENCOUNTER — OFFICE VISIT (OUTPATIENT)
Age: 45
End: 2025-04-28
Payer: MEDICARE

## 2025-04-28 VITALS
HEIGHT: 64 IN | WEIGHT: 178 LBS | BODY MASS INDEX: 30.39 KG/M2 | HEART RATE: 68 BPM | DIASTOLIC BLOOD PRESSURE: 69 MMHG | OXYGEN SATURATION: 98 % | SYSTOLIC BLOOD PRESSURE: 114 MMHG

## 2025-04-28 DIAGNOSIS — R07.89 CHEST PAIN, ATYPICAL: Primary | ICD-10-CM

## 2025-04-28 RX ORDER — BUSPIRONE HYDROCHLORIDE 10 MG/1
10 TABLET ORAL 2 TIMES DAILY PRN
COMMUNITY
Start: 2025-04-22 | End: 2026-04-17

## 2025-04-28 NOTE — PROGRESS NOTES
Cardiology Office Consultation      Encounter Date:  2025    PATIENT IDENTIFICATION    Name: Suzie Duvall  Age: 44 y.o. Sex: female : 1980  MRN: [id number]    Reason For Consultation:  Chest pain.    History of Present Illness:  Patient is a 44 y.o.  female  coming to cardiology office to establish care.    Patient has medical history obesity BMI 31, hyperlipidemia, GERD, depression.    Today patient complains of episodes of chest discomfort described as chest burning which was more frequent last month and has significantly improved over the last few weeks.  She denies similar symptoms in the past.  She denies shortness of breath, palpitations, dizziness, lightheadedness, presyncope or syncope.    Current medications include atorvastatin 10 mg daily, pantoprazole 20 mg daily, sertraline 50 mg daily,     Recent labs in patient's chart:  A1c 5.4, total cholesterol 208, , creatinine 0.74, sodium 139, potassium 4.2.  Prior cardiology workup.  I have personally interpreted the patient's recent TTE performed in 2025.  Study showed preserved ejection fraction, no significant valvular disease.  I have personally interpreted the patient's recent nuclear stress test in 2025.  Study showed normal myocardial perfusion and normal LVEF.      Assessment & Plan    Impressions:  Chest pain, atypical  Obesity BMI 31  Hyperlipidemia  GERD    Recommendations:  I believe her chest pains described as heartburn was caused by GERD.  I have personally interpreted patient's imaging studies including echo and nuclear stress test, no evidence of myocardial ischemia or regional wall motion abnormalities.  Advised patient to continue pantoprazole for GERD.  Patient has history of hyperlipidemia, continue atorvastatin 10 mg daily.  Highly encourage patient to be compliant with low-sodium diet, pursue weight loss and perform regular physical activity for her obesity of BMI 31.  Patient verbalized  "understanding and agreed.      Diagnoses and all orders for this visit:    1. Chest pain, atypical [R07.89] (Primary)         Objective:    Vitals:  Vitals:    04/28/25 1303   BP: 114/69   BP Location: Left arm   Patient Position: Sitting   Cuff Size: Adult   Pulse: 68   SpO2: 98%   Weight: 80.7 kg (178 lb)   Height: 162.6 cm (64\")     Body mass index is 30.55 kg/m².    Physical Exam:  General: Alert, cooperative, no distress, appears stated age  Lungs:  Clear to auscultation bilaterally, no wheezes, rhonchi or rales are noted  Chest wall: No tenderness  Heart::  Regular rate and rhythm, S1 and S2 normal, no murmur.  No rub or gallop  Abdomen: Soft, nontender, nondistended, bowel sounds active  Extremities: No cyanosis, clubbing, or edema  Pulses: 2+ and symmetric all extremities  Neuro/psych: No gross focal deficits      History of Present Illness      Allergies:  No Known Allergies    Medication Review:     Current Outpatient Medications:     atorvastatin (LIPITOR) 10 MG tablet, TAKE 1 TABLET BY MOUTH EVERY DAY AT NIGHT, Disp: 90 tablet, Rfl: 0    busPIRone (BUSPAR) 10 MG tablet, Take 1 tablet by mouth 2 (Two) Times a Day As Needed (anxiety)., Disp: , Rfl:     cholecalciferol (Vitamin D, Cholecalciferol,) 25 MCG (1000 UT) tablet, Take 2 tablets by mouth Daily., Disp: 180 tablet, Rfl: 3    meloxicam (MOBIC) 15 MG tablet, TAKE 1 TABLET BY MOUTH EVERY DAY, Disp: 90 tablet, Rfl: 1    pantoprazole (Protonix) 20 MG EC tablet, Take 1 tablet by mouth Daily., Disp: 90 tablet, Rfl: 3    sertraline (ZOLOFT) 50 MG tablet, Take 1 tablet by mouth Daily., Disp: 90 tablet, Rfl: 3    Family History:  Family History   Problem Relation Age of Onset    Heart failure Mother     Diabetes Father        Past Medical History:  Past Medical History:   Diagnosis Date    Acute peritonitis 05/30/2020    Anxiety     Depression     GERD (gastroesophageal reflux disease)     Obesity (BMI 30-39.9) 06/02/2020       Past surgical History:  Past " Surgical History:   Procedure Laterality Date    EXPLORATORY LAPAROTOMY N/A 05/30/2020    Procedure: LAPAROTOMY EXPLORATORY;  Surgeon: Scarlet Ruvalcaba MD;  Location: [facility] MAIN OR;  Service: General;  Laterality: N/A;    TUBAL ABDOMINAL LIGATION  02/24/2012       Social History:  Social History     Socioeconomic History    Marital status:    Tobacco Use    Smoking status: Never     Passive exposure: Never    Smokeless tobacco: Never   Vaping Use    Vaping status: Never Used   Substance and Sexual Activity    Alcohol use: Never    Drug use: Defer    Sexual activity: Yes     Partners: Male     Birth control/protection: Tubal ligation       Review of Systems:  The following systems were reviewed as they relate to the cardiovascular system: Constitutional, Eyes, ENT, Cardiovascular, Respiratory, Gastrointestinal, Integumentary, Neurological, Psychiatric, Hematologic, Endocrine, Musculoskeletal, and Genitourinary. The pertinent cardiovascular findings are reported above with all other cardiovascular points within those systems being negative.    Diagnostic Study Review:     Current Electrocardiogram:  Procedures  EKG report:  No prior EKG in her file.  EKG shows normal sinus rhythm, no ST deviation.  Normal EKG.    Laboratory Data:  Lab Results   Component Value Date    GLUCOSE 122 (H) 02/28/2024    BUN 16 02/28/2024    CREATININE 0.74 02/28/2024    EGFRIFNONA 116 06/08/2020    BCR 21.6 02/28/2024    K 4.2 02/28/2024    CO2 23.0 02/28/2024    CALCIUM 9.6 02/28/2024    ALBUMIN 4.4 02/28/2024    AST 18 02/28/2024    ALT 17 02/28/2024     Lab Results   Component Value Date    GLUCOSE 122 (H) 02/28/2024    CALCIUM 9.6 02/28/2024     02/28/2024    K 4.2 02/28/2024    CO2 23.0 02/28/2024     02/28/2024    BUN 16 02/28/2024    CREATININE 0.74 02/28/2024    EGFRIFNONA 116 06/08/2020    BCR 21.6 02/28/2024    ANIONGAP 10.0 02/28/2024     Lab Results   Component Value Date    WBC 8.70 02/28/2024    HGB 13.6  02/28/2024    HCT 41.6 02/28/2024    MCV 89.7 02/28/2024     02/28/2024     Lab Results   Component Value Date    CHOL 208 (H) 02/28/2024    TRIG 120 02/28/2024    HDL 51 02/28/2024     (H) 02/28/2024     Lab Results   Component Value Date    HGBA1C 5.40 02/28/2024     Lab Results   Component Value Date    INR 0.93 02/28/2024    INR 1.06 05/30/2020    PROTIME 10.2 02/28/2024    PROTIME 11.0 05/30/2020       Most Recent Echo:  Results for orders placed during the hospital encounter of 02/28/24    Adult Transthoracic Echo Complete W/ Cont if Necessary Per Protocol    Interpretation Summary    Left ventricular systolic function is normal. Calculated left ventricular EF = 57.6% Left ventricular ejection fraction appears to be 56 - 60%.    Indications  Chest pain    Technically satisfactory study.  Mitral valve is structurally normal.  Tricuspid valve is structurally normal.  Aortic valve is structurally normal.  Pulmonic valve could not be well visualized.  No evidence for mitral tricuspid or aortic regurgitation is seen by Doppler study.  Left atrium is normal in size.  Right atrium is normal in size.  Left ventricle is normal in size and contractility with ejection fraction of 60%.  No evidence for diastolic dysfunction is present.  Peak systolic left ventricular longitudinal strain is normal at -19%.  Right ventricle is normal in size and contractility with TAPSE of 1.8 cm..  Atrial septum is intact.  Aorta is normal.  No pericardial effusion or intracardiac thrombus is seen.    Impression  Structurally and functionally normal cardiac valves.  Left ventricular size and contractility is normal with ejection fraction of 60%.       Most Recent Stress Test:  Results for orders placed during the hospital encounter of 02/28/24    Stress Test With Myocardial Perfusion One Day    Interpretation Summary  Indications  Chest pain    This study was performed under the direct supervision of Linda BETH.    Resting  ECG  Sinus rhythm    The patient was injected with Lexiscan intravenously while constantly monitoring electrocardiogram and vital signs.  Patient did not have any chest discomfort ST abnormalities or ectopy with injection of Lexiscan.    Cardiolite was used as an imaging agent.    Cardiolite images showed uniform distribution of radionuclide without any evidence for myocardial ischemia.    Gated SPECT images revealed normal left ventricle size and contractility with ejection fraction of 86%.    Impression  ========  Lexiscan Cardiolite test is negative for myocardial ischemia.    Gated SPECT images revealed normal left ventricular size and contractility with ejection fraction of 86%.       Most Recent Cardiac Catheterization:   No results found for this or any previous visit.       NOTE: The following portions of the patient's note were reviewed, confirmed and/or updated this visit as appropriate: History of present illness/Interval history, physical examination, assessment & plan, allergies, current medications, past family history, past medical history, past social history, past surgical history and problem list.

## 2025-06-09 DIAGNOSIS — K21.9 GASTROESOPHAGEAL REFLUX DISEASE, UNSPECIFIED WHETHER ESOPHAGITIS PRESENT: ICD-10-CM

## 2025-06-09 RX ORDER — PANTOPRAZOLE SODIUM 20 MG/1
20 TABLET, DELAYED RELEASE ORAL DAILY
Qty: 90 TABLET | Refills: 2 | OUTPATIENT
Start: 2025-06-09

## (undated) DEVICE — PENCL HND ROCKRSWTCH HOLSTR EZ CLEAN TP CRD 10FT

## (undated) DEVICE — SUT VIC 3/0 SH CR8 18IN J864D

## (undated) DEVICE — SPNG LAP PREWSH SFTPK 18X18IN STRL PK/5

## (undated) DEVICE — SOL IRRIG H2O 1000ML STRL

## (undated) DEVICE — PENCL EVAC ULTRAVAC SMOKE W/BLD

## (undated) DEVICE — PK MAJ LAPAROTOMY 50

## (undated) DEVICE — ENSEAL 20 CM SHAFT, LARGE JAW: Brand: ENSEAL X1

## (undated) DEVICE — PK PROC TURNOVER

## (undated) DEVICE — SUT SILK 2/0 FS BLK 18IN 685G

## (undated) DEVICE — SOL IRRIG NACL 9PCT 1000ML BTL

## (undated) DEVICE — CVR HNDL LT SURG ACCSSRY BLU STRL

## (undated) DEVICE — DRN WND EVAC BULB 100CC

## (undated) DEVICE — GOWN,REINFORCE,POLY,SIRUS,BREATH SLV,XLG: Brand: MEDLINE

## (undated) DEVICE — SUT SILK 2/0 TIES 18IN A185H

## (undated) DEVICE — UNDERGLV SURG BIOGEL INDICAT PF 61/2 GRN

## (undated) DEVICE — GLV SURG BIOGEL SENSR LTX PF SZ6.5

## (undated) DEVICE — ECHELON FLEX  POWERED VASCULAR STAPLER WITH ADVANCED PLACEMENT TIP, 35MM: Brand: ECHELON FLEX

## (undated) DEVICE — TUBING, SUCTION, 1/4" X 12', STRAIGHT: Brand: MEDLINE

## (undated) DEVICE — TP SXN YANKR BULB STRL

## (undated) DEVICE — 450 ML BOTTLE OF 0.05% CHLORHEXIDINE GLUCONATE IN 99.95% STERILE WATER FOR IRRIGATION, USP AND APPLICATOR.: Brand: IRRISEPT ANTIMICROBIAL WOUND LAVAGE

## (undated) DEVICE — BLAKE SILICONE DRAIN, 19 FR ROUND, HUBLESS WITH 1/4" BENDABLE TROCAR: Brand: BLAKE

## (undated) DEVICE — TOWEL,OR,DSP,ST,WHITE,DLX,4/PK,20PK/CS: Brand: MEDLINE

## (undated) DEVICE — SUT VIC 1 CTX 36IN J977H

## (undated) DEVICE — CVR HNDL LT CAM LB54

## (undated) DEVICE — DRSNG WND BORDR/ADHS NONADHR/GZ LF 4X10IN STRL

## (undated) DEVICE — COVER,MAYO STAND,STERILE: Brand: MEDLINE

## (undated) DEVICE — UNDERGLV SURG BIOGEL INDICATOR LTX PF 7

## (undated) DEVICE — SLV SCD CALF HEMOFORCE DVT THERP REPR LG

## (undated) DEVICE — SUT PERMAHAND SILK 3/0 SH1 18IN